# Patient Record
Sex: FEMALE | Race: WHITE | Employment: OTHER | URBAN - METROPOLITAN AREA
[De-identification: names, ages, dates, MRNs, and addresses within clinical notes are randomized per-mention and may not be internally consistent; named-entity substitution may affect disease eponyms.]

---

## 2017-03-14 ENCOUNTER — TELEPHONE (OUTPATIENT)
Dept: NEUROLOGY | Facility: CLINIC | Age: 72
End: 2017-03-14

## 2017-03-14 DIAGNOSIS — M62.838 SPASM OF MUSCLE: Primary | ICD-10-CM

## 2017-03-14 DIAGNOSIS — G35 MULTIPLE SCLEROSIS (HCC): ICD-10-CM

## 2017-03-14 RX ORDER — TIZANIDINE 2 MG/1
TABLET ORAL
Qty: 60 TABLET | Refills: 5 | Status: SHIPPED | OUTPATIENT
Start: 2017-03-14 | End: 2017-09-14

## 2017-03-14 NOTE — TELEPHONE ENCOUNTER
[facility] pharmacy at [facility] calling stating that patient is transferring her Rx for Tizanidine to this location. Noted in EPIC that Rx previously written on 10/31/17 for #60/5 and sent to [facility] pharmacy.   Provided that pharmacy info to [place] and invited

## 2017-03-15 NOTE — TELEPHONE ENCOUNTER
Please see TE 10/31/16- Botox approval    Spoke with patient's . He states patient is in pain on and off due to the spasticity. He would like patient to try Botox injections.  He will call [address] [place] [address] to enquire about status of Botox and copay an

## 2017-03-16 ENCOUNTER — TELEPHONE (OUTPATIENT)
Dept: NEUROLOGY | Facility: CLINIC | Age: 72
End: 2017-03-16

## 2017-03-16 DIAGNOSIS — G35 MULTIPLE SCLEROSIS (HCC): ICD-10-CM

## 2017-03-16 DIAGNOSIS — G56.92 NEUROPATHY OF LEFT HAND: ICD-10-CM

## 2017-03-16 DIAGNOSIS — E11.42 DIABETIC POLYNEUROPATHY ASSOCIATED WITH TYPE 2 DIABETES MELLITUS (HCC): Primary | ICD-10-CM

## 2017-03-16 DIAGNOSIS — G56.22 ULNAR NERVE ENTRAPMENT AT ELBOW, LEFT: ICD-10-CM

## 2017-03-16 NOTE — TELEPHONE ENCOUNTER
Pharmacy calling because they have just rec'd pts Rxs transferred from another pharmacy, and these prescriptions do not have enough refills to accommodate the 90-day supply preferred by the patient, as insurance offers a cost savings.   They asked if they c

## 2017-03-17 RX ORDER — GABAPENTIN 100 MG/1
100 CAPSULE ORAL 3 TIMES DAILY
Qty: 270 CAPSULE | Refills: 0 | Status: SHIPPED | OUTPATIENT
Start: 2017-03-17 | End: 2017-06-15

## 2017-03-17 NOTE — TELEPHONE ENCOUNTER
Pt has transferred Rx to new pharmacy, and they are requesting additional refills for medication to make a 90-day supply. Contacted pharmacy to which original Rx was transmitted.   They confirmed that a single 30-day refill was transferred to [address] [place] [address]

## 2017-06-13 DIAGNOSIS — M24.522: ICD-10-CM

## 2017-06-13 DIAGNOSIS — M62.838 SPASM OF MUSCLE: ICD-10-CM

## 2017-06-13 DIAGNOSIS — G56.22 ULNAR NERVE ENTRAPMENT AT ELBOW, LEFT: Primary | ICD-10-CM

## 2017-06-13 DIAGNOSIS — G56.92 NEUROPATHY OF LEFT HAND: ICD-10-CM

## 2017-06-14 NOTE — TELEPHONE ENCOUNTER
LMTCB.  Please assist the patient in setting up an appointment, in order to process refill request.         Medication: Baclofen    Date of last refill: 10/31/17  [place]    Last office visit: 10/31/17  Due back to cl

## 2017-06-15 DIAGNOSIS — G56.22 ULNAR NERVE ENTRAPMENT AT ELBOW, LEFT: ICD-10-CM

## 2017-06-15 DIAGNOSIS — G56.92 NEUROPATHY OF LEFT HAND: ICD-10-CM

## 2017-06-15 DIAGNOSIS — E11.42 DIABETIC POLYNEUROPATHY ASSOCIATED WITH TYPE 2 DIABETES MELLITUS (HCC): Primary | ICD-10-CM

## 2017-06-15 RX ORDER — BACLOFEN 10 MG/1
TABLET ORAL
Qty: 720 TABLET | Refills: 0 | Status: SHIPPED | OUTPATIENT
Start: 2017-06-15 | End: 2017-06-22

## 2017-06-15 NOTE — TELEPHONE ENCOUNTER
Medication: GABAPENTIN 100 MG Oral Cap    Date of last refill: 3/17/17 (#270/0)  [place]    Last office visit: 10/31/16  Due back to clinic per last office note:  3 months  Date next office visit scheduled:  Future A

## 2017-06-19 RX ORDER — GABAPENTIN 100 MG/1
CAPSULE ORAL
Qty: 90 CAPSULE | Refills: 0 | Status: SHIPPED | OUTPATIENT
Start: 2017-06-19 | End: 2017-06-22

## 2017-06-22 ENCOUNTER — OFFICE VISIT (OUTPATIENT)
Dept: NEUROLOGY | Facility: CLINIC | Age: 72
End: 2017-06-22

## 2017-06-22 VITALS — HEART RATE: 84 BPM | RESPIRATION RATE: 12 BRPM | DIASTOLIC BLOOD PRESSURE: 76 MMHG | SYSTOLIC BLOOD PRESSURE: 112 MMHG

## 2017-06-22 DIAGNOSIS — Z99.3 WHEELCHAIR BOUND: ICD-10-CM

## 2017-06-22 DIAGNOSIS — G35 MULTIPLE SCLEROSIS (HCC): ICD-10-CM

## 2017-06-22 DIAGNOSIS — G56.92 NEUROPATHY OF LEFT HAND: Primary | ICD-10-CM

## 2017-06-22 DIAGNOSIS — M62.838 SPASM OF MUSCLE: ICD-10-CM

## 2017-06-22 DIAGNOSIS — G56.22 ULNAR NERVE ENTRAPMENT AT ELBOW, LEFT: ICD-10-CM

## 2017-06-22 DIAGNOSIS — E11.42 DIABETIC POLYNEUROPATHY ASSOCIATED WITH TYPE 2 DIABETES MELLITUS (HCC): ICD-10-CM

## 2017-06-22 PROCEDURE — 99214 OFFICE O/P EST MOD 30 MIN: CPT | Performed by: OTHER

## 2017-06-22 RX ORDER — GABAPENTIN 100 MG/1
CAPSULE ORAL
Qty: 180 CAPSULE | Refills: 0 | Status: SHIPPED | OUTPATIENT
Start: 2017-06-22 | End: 2017-10-30

## 2017-06-22 RX ORDER — BACLOFEN 10 MG/1
20 TABLET ORAL
Qty: 240 TABLET | Refills: 11 | Status: SHIPPED | OUTPATIENT
Start: 2017-06-22 | End: 2017-11-30

## 2017-06-22 NOTE — PROGRESS NOTES
[facility] [address]   Neurology; follow up  CLINIC VISIT  2017    Oletha Phlegm Patient Status:  No patient class for patient encounter    3/24/1945 MRN [id number]   Location [facility] PCP Yeyo Martin MD     REASON HISTORY  2/25/15    Comment Cystoscopy- Dr. Stevenson intermediate:  family history is not on file. SOCIAL HISTORY:   reports that she has quit smoking. She has never used smokeless tobacco. She reports that she drinks alcohol.  She reports that she do mouth before breafast. Disp:  Rfl:    Pravastatin Sodium (PRAVACHOL) 80 MG Oral Tab Take 80 mg by mouth daily with dinner. Disp:  Rfl:    Multiple Vitamin (DAILY VALUE MULTIVITAMIN) Oral Tab Take 1 tablet by mouth daily.  Disp:  Rfl:          REVIEW OF SYST Visual fields full, Pupils equal and reactive, Fundi normal       CN III, IV, VI:  Horrizontal and vertical movements normal.  abnormal pursuit and saccades.                             No nystagmus, no ptosis       CN V: Masseters strong, Facial sensations cervical radiculopathy at this time.  However, due to patient's significant muscle spasming from her MS condition, there is elbow contraction that is compromising interpretation.  Clinical correlation is indicated.       Dictated By Luís Delgado M.D.  M:     4

## 2017-06-22 NOTE — PATIENT INSTRUCTIONS
Refill policies:    • Allow 2-3 business days for refills; controlled substances may take longer.   • Contact your pharmacy at least 5 days prior to running out of medication and have them send an electronic request or submit request through the [facility] have a procedure or additional testing performed. [facility]) will contact your insurance carrier to obtain pre-certification or prior authorization.     Unfortunately, YOLANDA has seen an increase in denial of payment even though the p

## 2017-07-11 DIAGNOSIS — Z79.899 ENCOUNTER FOR DRUG THERAPY: ICD-10-CM

## 2017-07-11 DIAGNOSIS — M81.0 SENILE OSTEOPOROSIS: Primary | ICD-10-CM

## 2017-07-11 RX ORDER — ZOLEDRONIC ACID 5 MG/100ML
5 INJECTION, SOLUTION INTRAVENOUS ONCE
Status: CANCELLED | OUTPATIENT
Start: 2017-07-11

## 2017-07-12 ENCOUNTER — OFFICE VISIT (OUTPATIENT)
Dept: HEMATOLOGY/ONCOLOGY | Facility: HOSPITAL | Age: 72
End: 2017-07-12
Attending: INTERNAL MEDICINE
Payer: MEDICARE

## 2017-07-12 VITALS
DIASTOLIC BLOOD PRESSURE: 61 MMHG | OXYGEN SATURATION: 97 % | SYSTOLIC BLOOD PRESSURE: 134 MMHG | HEART RATE: 84 BPM | TEMPERATURE: 97 F | RESPIRATION RATE: 18 BRPM

## 2017-07-12 DIAGNOSIS — Z79.899 ENCOUNTER FOR DRUG THERAPY: ICD-10-CM

## 2017-07-12 DIAGNOSIS — M81.0 SENILE OSTEOPOROSIS: Primary | ICD-10-CM

## 2017-07-12 LAB
ALBUMIN SERPL-MCNC: 4 G/DL (ref 3.5–4.8)
CALCIUM BLD-MCNC: 9.7 MG/DL (ref 8.3–10.3)
CREAT BLD-MCNC: 1.1 MG/DL (ref 0.55–1.02)

## 2017-07-12 PROCEDURE — 82565 ASSAY OF CREATININE: CPT

## 2017-07-12 PROCEDURE — 82040 ASSAY OF SERUM ALBUMIN: CPT

## 2017-07-12 PROCEDURE — 36415 COLL VENOUS BLD VENIPUNCTURE: CPT

## 2017-07-12 PROCEDURE — 82310 ASSAY OF CALCIUM: CPT

## 2017-07-12 PROCEDURE — 96365 THER/PROPH/DIAG IV INF INIT: CPT

## 2017-07-12 RX ORDER — ZOLEDRONIC ACID 5 MG/100ML
5 INJECTION, SOLUTION INTRAVENOUS ONCE
Status: CANCELLED | OUTPATIENT
Start: 2017-07-12

## 2017-07-12 RX ORDER — ZOLEDRONIC ACID 5 MG/100ML
5 INJECTION, SOLUTION INTRAVENOUS ONCE
Status: COMPLETED | OUTPATIENT
Start: 2017-07-12 | End: 2017-07-12

## 2017-07-12 RX ADMIN — ZOLEDRONIC ACID 5 MG: 5 INJECTION, SOLUTION INTRAVENOUS at 14:25:00

## 2017-07-12 NOTE — PROGRESS NOTES
Education Record    Learner:  Patient    Disease / Diagnosis:osteoperosis    Barriers / Limitations:  None   Comments:    Method:  Discussion   Comments:    General Topics:  Medication, Side effects and symptom management, Plan of care reviewed and Fall ri

## 2017-07-18 DIAGNOSIS — G56.22 ULNAR NERVE ENTRAPMENT AT ELBOW, LEFT: ICD-10-CM

## 2017-07-18 DIAGNOSIS — E11.42 DIABETIC POLYNEUROPATHY ASSOCIATED WITH TYPE 2 DIABETES MELLITUS (HCC): ICD-10-CM

## 2017-07-18 DIAGNOSIS — G56.92 NEUROPATHY OF LEFT HAND: ICD-10-CM

## 2017-07-18 RX ORDER — GABAPENTIN 100 MG/1
CAPSULE ORAL
Qty: 90 CAPSULE | Refills: 0 | Status: SHIPPED | OUTPATIENT
Start: 2017-07-18 | End: 2017-08-19

## 2017-07-18 NOTE — TELEPHONE ENCOUNTER
Medication: Gabapentin 100 mg     Date of last refill: 6/22/17  [place]    Last office visit: 6/22/17  Due back to clinic per last office note:   Date next office visit scheduled:  No future appointments.     Last OV n

## 2017-08-19 DIAGNOSIS — G56.92 NEUROPATHY OF LEFT HAND: ICD-10-CM

## 2017-08-19 DIAGNOSIS — E11.42 DIABETIC POLYNEUROPATHY ASSOCIATED WITH TYPE 2 DIABETES MELLITUS (HCC): ICD-10-CM

## 2017-08-19 DIAGNOSIS — G56.22 ULNAR NERVE ENTRAPMENT AT ELBOW, LEFT: ICD-10-CM

## 2017-08-21 RX ORDER — GABAPENTIN 100 MG/1
CAPSULE ORAL
Qty: 90 CAPSULE | Refills: 3 | Status: SHIPPED | OUTPATIENT
Start: 2017-08-21 | End: 2017-11-30 | Stop reason: DRUGHIGH

## 2017-08-21 NOTE — TELEPHONE ENCOUNTER
Medication: Gabapentin 100mg     Date of last refill: 7/18/2017  [place]     Last office visit: 6/22/17  Due back to clinic per last office note:  6 months  Date next office visit scheduled:  No appointment     Last OV n

## 2017-09-14 DIAGNOSIS — G35 MULTIPLE SCLEROSIS (HCC): ICD-10-CM

## 2017-09-14 DIAGNOSIS — M62.838 SPASM OF MUSCLE: ICD-10-CM

## 2017-09-15 RX ORDER — TIZANIDINE 2 MG/1
TABLET ORAL
Qty: 180 TABLET | Refills: 1 | Status: SHIPPED | OUTPATIENT
Start: 2017-09-15 | End: 2017-11-30

## 2017-09-15 NOTE — TELEPHONE ENCOUNTER
Medication: TIZANIDINE HCL 2 MG Oral Tab    Date of last refill: 3/14/17 (#60/5)  [place]    Last office visit: 6/22/2017  Due back to clinic per last office note:  6 months  Date next office visit scheduled:  No fut

## 2017-10-17 ENCOUNTER — TELEPHONE (OUTPATIENT)
Dept: NEUROLOGY | Facility: CLINIC | Age: 72
End: 2017-10-17

## 2017-10-17 NOTE — TELEPHONE ENCOUNTER
Rx for 1 year supply written at [address] [place] [address] on 6/22/17. Pt should still have several refills left on this Rx. Contacted pharmacy to determine if we could verbally authorize combination of remaining refills into 90-day supplies, or if new Rx is needed.   Was able

## 2017-10-30 DIAGNOSIS — G56.92 NEUROPATHY OF LEFT HAND: ICD-10-CM

## 2017-10-30 DIAGNOSIS — E11.42 DIABETIC POLYNEUROPATHY ASSOCIATED WITH TYPE 2 DIABETES MELLITUS (HCC): ICD-10-CM

## 2017-10-30 DIAGNOSIS — G56.22 ULNAR NERVE ENTRAPMENT AT ELBOW, LEFT: ICD-10-CM

## 2017-10-30 RX ORDER — GABAPENTIN 100 MG/1
200 CAPSULE ORAL 3 TIMES DAILY
Qty: 540 CAPSULE | Refills: 0 | Status: SHIPPED | OUTPATIENT
Start: 2017-10-30 | End: 2017-11-30

## 2017-10-30 NOTE — TELEPHONE ENCOUNTER
Medication: Gabapentin 100mg cap **90-day supply request**    Date of last refill: 8/21/17 (#90/3)  [place]    Last office visit: 6/22/2017  Due back to clinic per last office note:  6 months  Date next office visit

## 2017-11-30 ENCOUNTER — APPOINTMENT (OUTPATIENT)
Dept: LAB | Age: 72
DRG: 481 | End: 2017-11-30
Attending: Other
Payer: MEDICARE

## 2017-11-30 ENCOUNTER — OFFICE VISIT (OUTPATIENT)
Dept: NEUROLOGY | Facility: CLINIC | Age: 72
End: 2017-11-30

## 2017-11-30 VITALS
HEART RATE: 84 BPM | SYSTOLIC BLOOD PRESSURE: 118 MMHG | WEIGHT: 110 LBS | DIASTOLIC BLOOD PRESSURE: 58 MMHG | RESPIRATION RATE: 16 BRPM | BODY MASS INDEX: 17 KG/M2

## 2017-11-30 DIAGNOSIS — G56.20 ULNAR NERVE ENTRAPMENT AT ELBOW, UNSPECIFIED LATERALITY: Primary | ICD-10-CM

## 2017-11-30 DIAGNOSIS — R41.3 SHORT-TERM MEMORY LOSS: ICD-10-CM

## 2017-11-30 DIAGNOSIS — G56.22 ULNAR NERVE ENTRAPMENT AT ELBOW, LEFT: ICD-10-CM

## 2017-11-30 DIAGNOSIS — M62.838 SPASM OF MUSCLE: ICD-10-CM

## 2017-11-30 DIAGNOSIS — E11.42 DIABETIC POLYNEUROPATHY ASSOCIATED WITH TYPE 2 DIABETES MELLITUS (HCC): ICD-10-CM

## 2017-11-30 DIAGNOSIS — G56.92 NEUROPATHY OF LEFT HAND: ICD-10-CM

## 2017-11-30 DIAGNOSIS — G35 MULTIPLE SCLEROSIS (HCC): ICD-10-CM

## 2017-11-30 PROBLEM — F02.80 DEMENTIA ASSOCIATED WITH OTHER UNDERLYING DISEASE (HCC): Status: ACTIVE | Noted: 2017-11-30

## 2017-11-30 PROCEDURE — 99215 OFFICE O/P EST HI 40 MIN: CPT | Performed by: OTHER

## 2017-11-30 PROCEDURE — 82607 VITAMIN B-12: CPT

## 2017-11-30 PROCEDURE — 83550 IRON BINDING TEST: CPT | Performed by: HOSPITALIST

## 2017-11-30 PROCEDURE — 83540 ASSAY OF IRON: CPT | Performed by: HOSPITALIST

## 2017-11-30 PROCEDURE — 84443 ASSAY THYROID STIM HORMONE: CPT

## 2017-11-30 PROCEDURE — 82728 ASSAY OF FERRITIN: CPT | Performed by: HOSPITALIST

## 2017-11-30 PROCEDURE — 36415 COLL VENOUS BLD VENIPUNCTURE: CPT

## 2017-11-30 RX ORDER — TIZANIDINE 2 MG/1
TABLET ORAL
Qty: 180 TABLET | Refills: 3 | Status: SHIPPED | OUTPATIENT
Start: 2017-11-30 | End: 2017-12-07

## 2017-11-30 RX ORDER — GABAPENTIN 100 MG/1
100 CAPSULE ORAL 2 TIMES DAILY
Qty: 180 CAPSULE | Refills: 3 | Status: SHIPPED | OUTPATIENT
Start: 2017-11-30 | End: 2018-02-23

## 2017-11-30 RX ORDER — BACLOFEN 10 MG/1
20 TABLET ORAL
Qty: 240 TABLET | Refills: 11 | Status: SHIPPED | OUTPATIENT
Start: 2017-11-30 | End: 2018-02-23

## 2017-11-30 RX ORDER — DONEPEZIL HYDROCHLORIDE 5 MG/1
5 TABLET, FILM COATED ORAL NIGHTLY
Qty: 30 TABLET | Refills: 11 | Status: SHIPPED | OUTPATIENT
Start: 2017-11-30 | End: 2018-02-23

## 2017-11-30 NOTE — PROGRESS NOTES
[facility] [address]   Neurology; follow up  CLINIC VISIT  2017    Russ Pool Patient Status:  No patient class for patient encounter    3/24/1945 MRN [id number]   Location [address] [place] [address] PCP Raghu Cisneros MD     REASON movements    MEDICATIONS:    Current Outpatient Prescriptions:  TiZANidine HCl 2 MG Oral Tab TAKE ONE TABLET (2 MG) BY MOUTH TWO TIMES DAILY. Disp: 180 tablet Rfl: 3   gabapentin 100 MG Oral Cap Take 1 capsule (100 mg total) by mouth 2 (two) times daily.  D deficits  HEENT: denies nasal congestion, sinus pain or sore throat; no  hearing loss;  RESPIRATORY: denies shortness of breath, wheezing or cough   CARDIOVASCULAR: denies chest pain, no palpitations   GI: denies nausea, vomiting, constipation, diarrhea; n Masseters strong, Facial sensations normal,        CN  VII:  Symmetric facial movement       CN VIII:  Normal hearing       CN IX, XI:  Normal Gag, uvula palate midline       CN XII:  SCM strong, equal shoulder shrug  Motor:  No drift, rapid finger movemen across the elbow, indicating entrapment neuropathy across the elbow is present.  There is no evidence of left lower cervical radiculopathy at this time.  However, due to patient's significant muscle spasming from her MS condition, there is elbow contractio diagnosis, prognosis, treatment. The patient and caregiver were given ample opportunity to ask questions. All questions and concerns were addressed.      Tereza Gardiner MD  General Neurology   Neuromuscular/ Electrodiagnostic Specialist  [facility] [facility]

## 2017-11-30 NOTE — PATIENT INSTRUCTIONS
Refill policies:    • Allow 2-3 business days for refills; controlled substances may take longer.   • Contact your pharmacy at least 5 days prior to running out of medication and have them send an electronic request or submit request through the [facility] have a procedure or additional testing performed. [facility]) will contact your insurance carrier to obtain pre-certification or prior authorization.     Unfortunately, YOLANDA has seen an increase in denial of payment even though the p

## 2017-12-01 ENCOUNTER — TELEPHONE (OUTPATIENT)
Dept: NEUROLOGY | Facility: CLINIC | Age: 72
End: 2017-12-01

## 2017-12-02 ENCOUNTER — APPOINTMENT (OUTPATIENT)
Dept: GENERAL RADIOLOGY | Facility: HOSPITAL | Age: 72
DRG: 481 | End: 2017-12-02
Attending: EMERGENCY MEDICINE
Payer: MEDICARE

## 2017-12-02 ENCOUNTER — ANESTHESIA (OUTPATIENT)
Dept: SURGERY | Facility: HOSPITAL | Age: 72
DRG: 481 | End: 2017-12-02
Payer: MEDICARE

## 2017-12-02 ENCOUNTER — ANESTHESIA EVENT (OUTPATIENT)
Dept: SURGERY | Facility: HOSPITAL | Age: 72
DRG: 481 | End: 2017-12-02
Payer: MEDICARE

## 2017-12-02 ENCOUNTER — APPOINTMENT (OUTPATIENT)
Dept: GENERAL RADIOLOGY | Facility: HOSPITAL | Age: 72
DRG: 481 | End: 2017-12-02
Attending: ORTHOPAEDIC SURGERY
Payer: MEDICARE

## 2017-12-02 ENCOUNTER — HOSPITAL ENCOUNTER (INPATIENT)
Facility: HOSPITAL | Age: 72
LOS: 3 days | Discharge: NURSING, REHAB OR LONG-TERM CARE | DRG: 481 | End: 2017-12-05
Attending: EMERGENCY MEDICINE | Admitting: HOSPITALIST
Payer: MEDICARE

## 2017-12-02 ENCOUNTER — SURGERY (OUTPATIENT)
Age: 72
End: 2017-12-02

## 2017-12-02 DIAGNOSIS — S72.051A CLOSED FRACTURE OF HEAD OF RIGHT FEMUR, INITIAL ENCOUNTER (HCC): Primary | ICD-10-CM

## 2017-12-02 PROBLEM — Z87.81 S/P RIGHT HIP FRACTURE: Status: ACTIVE | Noted: 2017-12-02

## 2017-12-02 PROCEDURE — 0QS606Z REPOSITION RIGHT UPPER FEMUR WITH INTRAMEDULLARY INTERNAL FIXATION DEVICE, OPEN APPROACH: ICD-10-PCS | Performed by: ORTHOPAEDIC SURGERY

## 2017-12-02 PROCEDURE — 73502 X-RAY EXAM HIP UNI 2-3 VIEWS: CPT | Performed by: EMERGENCY MEDICINE

## 2017-12-02 PROCEDURE — 73552 X-RAY EXAM OF FEMUR 2/>: CPT | Performed by: EMERGENCY MEDICINE

## 2017-12-02 PROCEDURE — 99223 1ST HOSP IP/OBS HIGH 75: CPT | Performed by: HOSPITALIST

## 2017-12-02 PROCEDURE — 3E0T3BZ INTRODUCTION OF ANESTHETIC AGENT INTO PERIPHERAL NERVES AND PLEXI, PERCUTANEOUS APPROACH: ICD-10-PCS | Performed by: ANESTHESIOLOGY

## 2017-12-02 PROCEDURE — 76001 XR FLUOROSCOPE EXAM >1 HR EXTENSIVE (CPT=76001): CPT | Performed by: ORTHOPAEDIC SURGERY

## 2017-12-02 DEVICE — IMPLANTABLE DEVICE: Type: IMPLANTABLE DEVICE | Site: FEMUR | Status: FUNCTIONAL

## 2017-12-02 DEVICE — ZNN CMN NAIL 11.5MMX40CM 125 R
Type: IMPLANTABLE DEVICE | Site: FEMUR | Status: FUNCTIONAL
Brand: ZIMMER® NATURAL NAIL® SYSTEM

## 2017-12-02 DEVICE — ZNN CMN LAG SCREW 10.5X90
Type: IMPLANTABLE DEVICE | Site: FEMUR | Status: FUNCTIONAL
Brand: ZIMMER® NATURAL NAIL® SYSTEM

## 2017-12-02 DEVICE — SCREW CORT FA 5.0X40 Z NAIL: Type: IMPLANTABLE DEVICE | Site: FEMUR | Status: FUNCTIONAL

## 2017-12-02 RX ORDER — ONDANSETRON 2 MG/ML
4 INJECTION INTRAMUSCULAR; INTRAVENOUS EVERY 6 HOURS PRN
Status: DISCONTINUED | OUTPATIENT
Start: 2017-12-02 | End: 2017-12-05

## 2017-12-02 RX ORDER — HYDROCODONE BITARTRATE AND ACETAMINOPHEN 5; 325 MG/1; MG/1
2 TABLET ORAL AS NEEDED
Status: DISCONTINUED | OUTPATIENT
Start: 2017-12-02 | End: 2017-12-02 | Stop reason: HOSPADM

## 2017-12-02 RX ORDER — MAGNESIUM HYDROXIDE 1200 MG/15ML
LIQUID ORAL CONTINUOUS PRN
Status: DISCONTINUED | OUTPATIENT
Start: 2017-12-02 | End: 2017-12-02

## 2017-12-02 RX ORDER — HYDROMORPHONE HYDROCHLORIDE 1 MG/ML
0.4 INJECTION, SOLUTION INTRAMUSCULAR; INTRAVENOUS; SUBCUTANEOUS EVERY 5 MIN PRN
Status: DISCONTINUED | OUTPATIENT
Start: 2017-12-02 | End: 2017-12-02 | Stop reason: HOSPADM

## 2017-12-02 RX ORDER — BISACODYL 10 MG
10 SUPPOSITORY, RECTAL RECTAL
Status: DISCONTINUED | OUTPATIENT
Start: 2017-12-02 | End: 2017-12-05

## 2017-12-02 RX ORDER — HYDROCODONE BITARTRATE AND ACETAMINOPHEN 5; 325 MG/1; MG/1
1 TABLET ORAL ONCE
Status: COMPLETED | OUTPATIENT
Start: 2017-12-02 | End: 2017-12-02

## 2017-12-02 RX ORDER — CEFAZOLIN SODIUM 1 G/3ML
INJECTION, POWDER, FOR SOLUTION INTRAMUSCULAR; INTRAVENOUS
Status: DISCONTINUED | OUTPATIENT
Start: 2017-12-02 | End: 2017-12-02 | Stop reason: HOSPADM

## 2017-12-02 RX ORDER — DONEPEZIL HYDROCHLORIDE 10 MG/1
5 TABLET, FILM COATED ORAL NIGHTLY
Status: DISCONTINUED | OUTPATIENT
Start: 2017-12-02 | End: 2017-12-05

## 2017-12-02 RX ORDER — BACLOFEN 10 MG/1
20 TABLET ORAL 4 TIMES DAILY
Status: DISCONTINUED | OUTPATIENT
Start: 2017-12-02 | End: 2017-12-05

## 2017-12-02 RX ORDER — ONDANSETRON 2 MG/ML
4 INJECTION INTRAMUSCULAR; INTRAVENOUS AS NEEDED
Status: DISCONTINUED | OUTPATIENT
Start: 2017-12-02 | End: 2017-12-02 | Stop reason: HOSPADM

## 2017-12-02 RX ORDER — MEPERIDINE HYDROCHLORIDE 25 MG/ML
12.5 INJECTION INTRAMUSCULAR; INTRAVENOUS; SUBCUTANEOUS AS NEEDED
Status: DISCONTINUED | OUTPATIENT
Start: 2017-12-02 | End: 2017-12-02 | Stop reason: HOSPADM

## 2017-12-02 RX ORDER — HYDROCODONE BITARTRATE AND ACETAMINOPHEN 5; 325 MG/1; MG/1
1 TABLET ORAL EVERY 6 HOURS PRN
Status: DISCONTINUED | OUTPATIENT
Start: 2017-12-02 | End: 2017-12-03

## 2017-12-02 RX ORDER — HYDROMORPHONE HYDROCHLORIDE 1 MG/ML
0.5 INJECTION, SOLUTION INTRAMUSCULAR; INTRAVENOUS; SUBCUTANEOUS EVERY 2 HOUR PRN
Status: DISCONTINUED | OUTPATIENT
Start: 2017-12-02 | End: 2017-12-03

## 2017-12-02 RX ORDER — HYDROMORPHONE HYDROCHLORIDE 1 MG/ML
0.4 INJECTION, SOLUTION INTRAMUSCULAR; INTRAVENOUS; SUBCUTANEOUS
Status: DISCONTINUED | OUTPATIENT
Start: 2017-12-02 | End: 2017-12-03

## 2017-12-02 RX ORDER — MORPHINE SULFATE 4 MG/ML
4 INJECTION, SOLUTION INTRAMUSCULAR; INTRAVENOUS ONCE
Status: COMPLETED | OUTPATIENT
Start: 2017-12-02 | End: 2017-12-02

## 2017-12-02 RX ORDER — LEVOTHYROXINE SODIUM 0.07 MG/1
75 TABLET ORAL
Status: DISCONTINUED | OUTPATIENT
Start: 2017-12-03 | End: 2017-12-05

## 2017-12-02 RX ORDER — SODIUM CHLORIDE, SODIUM LACTATE, POTASSIUM CHLORIDE, CALCIUM CHLORIDE 600; 310; 30; 20 MG/100ML; MG/100ML; MG/100ML; MG/100ML
INJECTION, SOLUTION INTRAVENOUS CONTINUOUS
Status: DISCONTINUED | OUTPATIENT
Start: 2017-12-02 | End: 2017-12-02 | Stop reason: HOSPADM

## 2017-12-02 RX ORDER — LISINOPRIL 2.5 MG/1
2.5 TABLET ORAL DAILY
Status: DISCONTINUED | OUTPATIENT
Start: 2017-12-03 | End: 2017-12-05

## 2017-12-02 RX ORDER — CELECOXIB 200 MG/1
200 CAPSULE ORAL
Status: DISCONTINUED | OUTPATIENT
Start: 2017-12-03 | End: 2017-12-05

## 2017-12-02 RX ORDER — HYDROCODONE BITARTRATE AND ACETAMINOPHEN 5; 325 MG/1; MG/1
1 TABLET ORAL EVERY 4 HOURS PRN
Status: DISCONTINUED | OUTPATIENT
Start: 2017-12-02 | End: 2017-12-05

## 2017-12-02 RX ORDER — TRAZODONE HYDROCHLORIDE 50 MG/1
50 TABLET ORAL NIGHTLY PRN
Status: DISCONTINUED | OUTPATIENT
Start: 2017-12-02 | End: 2017-12-05

## 2017-12-02 RX ORDER — DOCUSATE SODIUM 100 MG/1
100 CAPSULE, LIQUID FILLED ORAL 2 TIMES DAILY
Status: DISCONTINUED | OUTPATIENT
Start: 2017-12-02 | End: 2017-12-05

## 2017-12-02 RX ORDER — SODIUM CHLORIDE, SODIUM LACTATE, POTASSIUM CHLORIDE, CALCIUM CHLORIDE 600; 310; 30; 20 MG/100ML; MG/100ML; MG/100ML; MG/100ML
INJECTION, SOLUTION INTRAVENOUS CONTINUOUS
Status: DISCONTINUED | OUTPATIENT
Start: 2017-12-02 | End: 2017-12-05

## 2017-12-02 RX ORDER — TIZANIDINE 2 MG/1
2 TABLET ORAL 2 TIMES DAILY
Status: DISCONTINUED | OUTPATIENT
Start: 2017-12-02 | End: 2017-12-05

## 2017-12-02 RX ORDER — ENOXAPARIN SODIUM 100 MG/ML
40 INJECTION SUBCUTANEOUS DAILY
Status: DISCONTINUED | OUTPATIENT
Start: 2017-12-03 | End: 2017-12-05

## 2017-12-02 RX ORDER — DEXTROSE MONOHYDRATE 25 G/50ML
50 INJECTION, SOLUTION INTRAVENOUS
Status: DISCONTINUED | OUTPATIENT
Start: 2017-12-02 | End: 2017-12-02 | Stop reason: HOSPADM

## 2017-12-02 RX ORDER — ONDANSETRON 2 MG/ML
8 INJECTION INTRAMUSCULAR; INTRAVENOUS EVERY 6 HOURS PRN
Status: DISCONTINUED | OUTPATIENT
Start: 2017-12-02 | End: 2017-12-05

## 2017-12-02 RX ORDER — ZOLPIDEM TARTRATE 5 MG/1
5 TABLET ORAL NIGHTLY PRN
Status: DISCONTINUED | OUTPATIENT
Start: 2017-12-02 | End: 2017-12-05

## 2017-12-02 RX ORDER — ENOXAPARIN SODIUM 100 MG/ML
40 INJECTION SUBCUTANEOUS DAILY
Status: DISCONTINUED | OUTPATIENT
Start: 2017-12-02 | End: 2017-12-02

## 2017-12-02 RX ORDER — DEXTROSE MONOHYDRATE 25 G/50ML
50 INJECTION, SOLUTION INTRAVENOUS
Status: DISCONTINUED | OUTPATIENT
Start: 2017-12-02 | End: 2017-12-05

## 2017-12-02 RX ORDER — POLYETHYLENE GLYCOL 3350 17 G/17G
1 POWDER, FOR SOLUTION ORAL DAILY PRN
Status: DISCONTINUED | OUTPATIENT
Start: 2017-12-02 | End: 2017-12-05

## 2017-12-02 RX ORDER — HYDROCODONE BITARTRATE AND ACETAMINOPHEN 5; 325 MG/1; MG/1
1 TABLET ORAL AS NEEDED
Status: DISCONTINUED | OUTPATIENT
Start: 2017-12-02 | End: 2017-12-02 | Stop reason: HOSPADM

## 2017-12-02 RX ORDER — HYDROCODONE BITARTRATE AND ACETAMINOPHEN 5; 325 MG/1; MG/1
2 TABLET ORAL EVERY 4 HOURS PRN
Status: DISCONTINUED | OUTPATIENT
Start: 2017-12-02 | End: 2017-12-05

## 2017-12-02 RX ORDER — ACETAMINOPHEN 325 MG/1
650 TABLET ORAL EVERY 6 HOURS PRN
Status: DISCONTINUED | OUTPATIENT
Start: 2017-12-02 | End: 2017-12-05

## 2017-12-02 RX ORDER — GABAPENTIN 100 MG/1
100 CAPSULE ORAL 2 TIMES DAILY
Status: DISCONTINUED | OUTPATIENT
Start: 2017-12-02 | End: 2017-12-05

## 2017-12-02 RX ORDER — NALOXONE HYDROCHLORIDE 0.4 MG/ML
80 INJECTION, SOLUTION INTRAMUSCULAR; INTRAVENOUS; SUBCUTANEOUS AS NEEDED
Status: DISCONTINUED | OUTPATIENT
Start: 2017-12-02 | End: 2017-12-02 | Stop reason: HOSPADM

## 2017-12-02 NOTE — ED PROVIDER NOTES
Patient Seen in: [facility] Emergency Department    History   Patient presents with:  Fall (musculoskeletal, neurologic)    Stated Complaint: fall    HPI    69-year-old female presents emergency department who was in a chair left yesterday and fell extraocular muscles intact no scleral icterus, mucous membranes are moist, there is no erythema or exudate in the posterior pharynx  Neck: Supple no JVD no lymphadenopathy no meningismus no carotid bruit  CV: Regular rate and rhythm no murmur rub  Respirat List

## 2017-12-02 NOTE — ANESTHESIA PREPROCEDURE EVALUATION
PRE-OP EVALUATION    Patient Name: Ines Doss    Pre-op Diagnosis: S/P right hip fracture [Z87.81]    Procedure(s):  Right hip intramedullary nailing    Surgeon(s) and Role:     Wing Rigsg MD - Primary    Pre-op vitals reviewed.   Temp: 98 °F (3 12/02/2017   MCHC 33.5 12/02/2017   RDW 12.0 12/02/2017   .0 (L) 12/02/2017       Lab Results  Component Value Date    12/02/2017   K 4.8 12/02/2017    12/02/2017   CO2 28.0 12/02/2017   BUN 32 (H) 12/02/2017   CREATSERUM 1.12 (H) 12/02/

## 2017-12-02 NOTE — PROGRESS NOTES
Ortho    Chart and x-rays reviewed. Pt with R displaced wally-trochanteric femur fx. Will require surgical stabilization. To OR later today if cleared. Full consult to follow.     Ruba Gongora MD  [facility]

## 2017-12-02 NOTE — H&P
[facility]  History and Physical     Abimael Vickers Patient Status:  Emergency    3/24/1945 MRN [id number]   Location [facility] [facility] Attending Rohit Upton MD   Hosp Day # 0 PCP Clau Schmitt MD     Chief Complaint: fall PLUS In Vitro Strip USE TO TEST 3 TIMES A DAY Disp:  Rfl: 3   HUMALOG KWIKPEN 100 UNIT/ML Subcutaneous Solution Pen-injector 12 Units 2 (two) times daily.  12 units in AM, 10 units at dinner  Disp:  Rfl: 1   BD PEN NEEDLE SHORT U/F 31G X 8 MM Does not apply murmurs, rubs or gallops. Equal pulses. Chest and Back: No tenderness or deformity. Abdomen: Soft, nontender, nondistended. Positive bowel sounds. No rebound, guarding or organomegaly.   Neurologic: stigmata of MS; left arm contractures; weakness in all

## 2017-12-02 NOTE — CONSULTS
[facility]  Report of Consultation    Addyjacquelyn Houstons Patient Status:  Emergency    3/24/1945 MRN [id number]   Location [facility] Attending Herman Gomez MD   Hosp Day # 0 PCP Martina Canela MD     Reason for Consultation:  MARLO murillo reaction to light. Oropharynx is clear. Normocephalic, atraumatic. Neck: No tenderness to palpitation. Full range of motion to flexion and extension, lateral rotation and lateral flexion of cervical spine. No JVD. Supple.    Lungs: Clear to auscultatio shoulder region, unspecified     Status post replacement of right shoulder joint     Senile osteoporosis     Encounter for drug therapy     Contusion of right thigh, initial encounter     Multiple sclerosis ([facility] [address]     Pain in both hands     Wheelchair bound

## 2017-12-03 PROCEDURE — 30233N1 TRANSFUSION OF NONAUTOLOGOUS RED BLOOD CELLS INTO PERIPHERAL VEIN, PERCUTANEOUS APPROACH: ICD-10-PCS | Performed by: HOSPITALIST

## 2017-12-03 PROCEDURE — 99232 SBSQ HOSP IP/OBS MODERATE 35: CPT | Performed by: HOSPITALIST

## 2017-12-03 RX ORDER — HYDROMORPHONE HYDROCHLORIDE 1 MG/ML
0.4 INJECTION, SOLUTION INTRAMUSCULAR; INTRAVENOUS; SUBCUTANEOUS EVERY 2 HOUR PRN
Status: DISCONTINUED | OUTPATIENT
Start: 2017-12-03 | End: 2017-12-05

## 2017-12-03 RX ORDER — SODIUM CHLORIDE 9 MG/ML
INJECTION, SOLUTION INTRAVENOUS ONCE
Status: COMPLETED | OUTPATIENT
Start: 2017-12-03 | End: 2017-12-03

## 2017-12-03 RX ORDER — HYDROMORPHONE HYDROCHLORIDE 1 MG/ML
0.2 INJECTION, SOLUTION INTRAMUSCULAR; INTRAVENOUS; SUBCUTANEOUS
Status: DISCONTINUED | OUTPATIENT
Start: 2017-12-03 | End: 2017-12-05

## 2017-12-03 NOTE — BRIEF OP NOTE
Pre-Operative Diagnosis: S/P right hip fracture [Z87.81]     Post-Operative Diagnosis: S/P right hip fracture [Z87.81]     Procedure Performed:   Procedure(s):  Right hip intramedullary nailing    Surgeon(s) and Role:     Hayder Courtney MD - Primary

## 2017-12-03 NOTE — PROGRESS NOTES
12/03/17 1411   Clinical Encounter Type   Visited With Patient   Routine Visit (Request for [facility])   Continue Visiting No   Patient's Supportive Strategies/Resources  provided emotional support including active listening and acknowledgement of conc

## 2017-12-03 NOTE — PHYSICAL THERAPY NOTE
Attempted to see pt this afternoon for PT eval, however pt currently with a glucose level in the 40's. Therefore, per RN, will hold PT until pt is medically stable and appropriate. Will attempt to see pt as time allows.  CM

## 2017-12-03 NOTE — PROGRESS NOTES
[facility]  Progress note     Jaja Burciaga Patient Status:  Emergency    3/24/1945 MRN [id number]   Location [address] [facility] Attending Robert Cain MD   Hosp Day # 1 PCP Ragini Brink MD     Chief Complaint: fall    Sub cbc in am  5. Multiple sclerosis-resume muscle relaxants  6. Hypothyroidism-resume replacement  7. Anemia-monitor  8.  Thrombocytopenia-monitor    Quality:  · DVT Prophylaxis: lovenox,scds  · CODE status: full  · Gabriel: no    Plan of care discussed with cortes

## 2017-12-03 NOTE — OPERATIVE REPORT
[facility]    PATIENT'S NAME: Matilda Chung   ATTENDING PHYSICIAN: Yamileth Marvin M.D. OPERATING PHYSICIAN: Manuel Man M.D.    PATIENT ACCOUNT#:   [de-identified]    LOCATION:  [address] [place] [address]  MEDICAL RECORD #:   [id number]       DATE OF BIRTH:  [birth year] She was then moved to the fracture table. She was lightly sedated and the right lower extremity was placed in traction. The left lower extremity was then abducted to allow for x-ray visualization of fracture.   After the leg was brought out to length, the of the hardware. The incisions were closed with 2-0 Vicryl subcutaneous tissue, followed by staples. Sterile bulky dressings were applied. The patient was taken to the recovery room in fair condition.   She will be admitted for postoperative observation

## 2017-12-03 NOTE — ANESTHESIA POSTPROCEDURE EVALUATION
[facility] Patient Status:  Emergency   Age/Gender 71year old female MRN [id number]   [facility] SURGERY Attending Kiarra Khan MD   Hosp Day # 0 PCP Stacey Mayorga MD       Anesthesia Post-op Note    Procedure(s):

## 2017-12-03 NOTE — OCCUPATIONAL THERAPY NOTE
Attempted to see pt for OT Eval.  Informed by nurse that pt glucose is in 40s and for therapy to be held for today. Will f/u tomorrow.

## 2017-12-03 NOTE — PLAN OF CARE
Patient admitted to 357 via bed from PACU. Accompanied by transporter and spouse. Patient is alert and oriented to person, place, time and situation. Patient is slow to respond and is poor historian, looking to her  for answers.   Patient's alonso

## 2017-12-03 NOTE — PROGRESS NOTES
[facility] Patient Status:  Inpatient    3/24/1945 MRN [id number]   [facility] 3SW-A Attending Delroy Douglas MD   Hosp Day # 1 PCP Gloria Falk MD         S:  Patient doing well. No nausea.   No SOB, CP or calf pa

## 2017-12-04 PROCEDURE — 99232 SBSQ HOSP IP/OBS MODERATE 35: CPT | Performed by: HOSPITALIST

## 2017-12-04 RX ORDER — SENNOSIDES 8.6 MG
8.6 TABLET ORAL 2 TIMES DAILY
Status: DISCONTINUED | OUTPATIENT
Start: 2017-12-04 | End: 2017-12-05

## 2017-12-04 RX ORDER — HYDROCODONE BITARTRATE AND ACETAMINOPHEN 5; 325 MG/1; MG/1
1 TABLET ORAL EVERY 6 HOURS PRN
Qty: 20 TABLET | Refills: 0 | Status: SHIPPED | OUTPATIENT
Start: 2017-12-04 | End: 2017-12-14

## 2017-12-04 NOTE — PROGRESS NOTES
[facility]  Progress Note    Ana Sheldon Patient Status:  Inpatient    3/24/1945 MRN [id number]   [facility] 3SW-A Attending Prieto Ferraro MD   Hosp Day # 2 PCP Dell Gao MD     Subjective:   Ana Sheldon is a(n) 71 year right femur, initial encounter ([facility] [address]     Constipation      R prox femur wally-trochanteric fx    S/P IMN  PT for B to C transfers  Pain control  DVT proph  D/C planning    Time spent on counseling/coordination of care:  15 Minutes    Total time spent with p

## 2017-12-04 NOTE — PHYSICAL THERAPY NOTE
PHYSICAL THERAPY EVALUATION - INPATIENT     Room Number: 357/357-A  Evaluation Date: 12/4/2017  Type of Evaluation: Initial  Physician Order: PT Eval and Treat    Presenting Problem: S/p Fall - Right hip Peritrochanteric Fx Femur - IM nail ing on 12/02 motivated. Patient self-stated goal is to be able to get strong enough that her  alone can assist her    OBJECTIVE  Precautions:  Other (Comment) (baseline Max assist transfers @ home,Nonambulatory)  Fall Risk: High fall risk    WEIGHT BEARING RES patient currently need. ..   -   Moving to and from a bed to a chair (including a wheelchair)?: Total   -   Need to walk in hospital room?: Total   -   Climbing 3-5 steps with a railing?: Total       AM-PAC Score:  Raw Score: 6   PT Approx Degree of Impairm evaluation, patient's clinical presentation is stable and overall the evaluation complexity is considered low. These impairments and comorbidities manifest themselves as functional limitations in independent bed mobility & transfers skills. Per patient her

## 2017-12-04 NOTE — PROGRESS NOTES
[facility]  Progress note     Lilyan Mention Patient Status:  Emergency    3/24/1945 MRN [id number]   Location [facility] [facility] Attending Eren Pedro MD   Hosp Day # 2 PCP Jasen Ngo MD     Chief Complaint: fall    Sub levemir  3. Constipation-add senna to docusate today  4. Suspect mild CKD  5. Expected acute blood loss anemia-transfuse 1 u prbcs today with incomplete response; reassess in am; check iron studies  6. Multiple sclerosis-resume muscle relaxants  7.  Hypothy

## 2017-12-04 NOTE — OCCUPATIONAL THERAPY NOTE
OCCUPATIONAL THERAPY EVALUATION - INPATIENT     Room Number: 357/357-A  Evaluation Date: 12/4/2017  Type of Evaluation: Initial  Presenting Problem: s/p R hip IM nailing 12/3/17    Physician Order: IP Consult to Occupational Therapy  Reason for Therapy: AD setup assist for feeding, grooming, and sponge bathing (reports hasn't used tub since a fall there \"a long time ago\"); reports mod assist for UB dressing (can do R side,  assists with L side; reports total assist for LB dressing, and max assist fo ‘6-Clicks’ Inpatient Daily Activity Short Form  How much help from another person does the patient currently need…  -   Putting on and taking off regular lower body clothing?: Total  -   Bathing (including washing, rinsing, drying)?: A Lot  -   Toileting, include: AM-PAC \"6 Clicks\".  In this OT evaluation patient presents with the following performance deficits: increased pain, decreased balance, decreased endurance, decreased knowledge of hip precautions and incorporation into ADLs, decreased knowledge of assessments, limited treatment options    Overall Complexity LOW     OT Discharge Recommendations: Sub-acute rehabilitation (elos 19-21 days)  OT Device Recommendations: TBD    PLAN  OT Treatment Plan: Balance activities; ADL training;Functional transfer tr

## 2017-12-04 NOTE — CM/SW NOTE
SW received order for d/c planning and met with pt and pt's spouse for assessment and d/c planning. Pt is a 71 y.o female admitted on 12/02 after unplanned right hip sx. Pt has no recent admissions per chart review.  Pt is A&O, lives with spouse and spouse

## 2017-12-04 NOTE — CM/SW NOTE
MARITZA confirmed [facility] and [facility] have accepted pt for JUAN JOSE. [facility] has a private room [facility] needs to confirm if they will have private room available. MARITZA met with pt and provided update.  MARITZA will follow up with pt's spouse for JUAN JOSE renteria

## 2017-12-05 VITALS
OXYGEN SATURATION: 95 % | DIASTOLIC BLOOD PRESSURE: 124 MMHG | TEMPERATURE: 98 F | HEART RATE: 85 BPM | RESPIRATION RATE: 18 BRPM | SYSTOLIC BLOOD PRESSURE: 152 MMHG | BODY MASS INDEX: 17.27 KG/M2 | HEIGHT: 67 IN | WEIGHT: 110 LBS

## 2017-12-05 PROCEDURE — 99239 HOSP IP/OBS DSCHRG MGMT >30: CPT | Performed by: HOSPITALIST

## 2017-12-05 RX ORDER — ENOXAPARIN SODIUM 100 MG/ML
40 INJECTION SUBCUTANEOUS DAILY
Qty: 1 VIAL | Refills: 0 | Status: SHIPPED | OUTPATIENT
Start: 2017-12-05 | End: 2017-12-12

## 2017-12-05 RX ORDER — POLYETHYLENE GLYCOL 3350 17 G/17G
1 POWDER, FOR SOLUTION ORAL DAILY PRN
Qty: 1 EACH | Refills: 0 | Status: SHIPPED | OUTPATIENT
Start: 2017-12-05 | End: 2018-02-23

## 2017-12-05 NOTE — OCCUPATIONAL THERAPY NOTE
OCCUPATIONAL THERAPY TREATMENT NOTE - INPATIENT     Room Number: 357/357-A  Session: 1   Number of Visits to Meet Established Goals: 5    Presenting Problem: s/p R hip IM nailing 12/3/17    History related to current admission: Patient is 71year old femal ‘6-Clicks’ Inpatient Daily Activity Short Form  How much help from another person does the patient currently need…  -   Putting on and taking off regular lower body clothing?: Total  -   Bathing (including washing, rinsing, drying)?: A Lot  -   Toileting, OT goals with improvements in balance, endurance and independence in ADL tasks, however patient remains below her baseline in these and other functional areas.  Patient would benefit from continued OT services during hospital stay to further address these d

## 2017-12-05 NOTE — PROGRESS NOTES
NURSING DISCHARGE NOTE    Discharged to [facility] rehab  via ambulance. .  Accompanied by medic staff. Belongings packed and sent home with her . Report given to [facility] at [facility].   Transfer paper sent with ambulance staff, osvaldo

## 2017-12-05 NOTE — PHYSICAL THERAPY NOTE
PHYSICAL THERAPY TREATMENT NOTE - INPATIENT    Room Number: 357/357-A     Session: 1  Number of Visits to Meet Established Goals: 5    Presenting Problem: S/p Fall - Right hip Peritrochanteric Fx Femur - IM nail ing on 12/02/17    Problem List  Principal bed (including adjusting bedclothes, sheets and blankets)?: Unable   -   Sitting down on and standing up from a chair with arms (e.g., wheelchair, bedside commode, etc.): Unable   -   Moving from lying on back to sitting on the side of the bed?: Unable   H 12/02/17. Continues to require max assist of 2 for bed mobility & functional transfers. Per  baseline was to be able to transfer to w/c or bedside chair with max assist of 1 - partially stand pivot. Patient will continue to benefit by P. T.as tolerat

## 2017-12-05 NOTE — DISCHARGE SUMMARY
[facility]PITAL[facility]  DISCHARGE SUMMARY     Pita Olvera Patient Status:  Inpatient    3/24/1945 MRN [id number]   [facility] 3SW-A Attending No att. providers found   Hosp Day # 3 PCP Elie Griffin MD     Date of Admission: 2017  Jeffrey results pending at Discharge:   · none    Consultants:  • ortho    Discharge Medication List:     Discharge Medications      START taking these medications      Instructions Prescription details   Enoxaparin Sodium 40 MG/0.4ML Soln  Commonly known as:  LOV drug:  Insulin Pen Needle      USE TO INJECT INSULIN 4-5 TIMES A DAY AS DIRECTED   Refills:  3     celecoxib 200 MG Caps  Commonly known as:  CeleBREX      Take 200 mg by mouth daily with breakfast.   Refills:  0     DAILY VALUE MULTIVITAMIN Tabs      Take as possible for a visit in 2 weeks      MD [facility] [address] [address]  [phone]    In 1 week        Vital signs:  Temp:  [97.9 °F (36.6 °C)] 97.9 °F (36.6 °C)  Pulse:  [72-85] 85  Resp:  [18] 18  BP: (152-174)/()

## 2017-12-05 NOTE — CM/SW NOTE
Informed by RN that  requested additional referral to 's. Referral sent via [facility]. Pt is accepted to a private room. [facility] would also be able to provide a private room. [facility] does not have pvt rm for today.      updated re:

## 2017-12-05 NOTE — PROGRESS NOTES
[facility]  Progress note     Lilyan Mention Patient Status:  Emergency    3/24/1945 MRN [id number]   [facility] [address] [facility] Attending Eren Pedro MD   [facility] Day # 3 PCP Jasen Ngo MD     Chief Complaint: fall    Sub muscle relaxants  7. Hypothyroidism-resume replacement  8. Thrombocytopenia-monitor    Hopefully can d/c to SNF today. First, need to recheck cbc at noon.     Quality:  · DVT Prophylaxis: lovenox,scds  · CODE status: full  · Gabriel: no    Plan of care discu

## 2017-12-05 NOTE — PROGRESS NOTES
[facility]  Progress Note    Jasmin Carbajal Patient Status:  Inpatient    3/24/1945 MRN [id number]   [facility] 3SW-A Attending No att. providers found   Hosp Day # 3 PCP Josafat Chadwick MD     Subjective:   Jasmin Carbajal is a(n) 71 of head of right femur, initial encounter ([facility] [address]     Constipation      R hip wally-troch fx    WBAT for transfers  Cont PT  DVT proph  D/C planning, ok to d/c from ortho standpoint    Time spent on counseling/coordination of care:  15 Minutes    Total time s

## 2017-12-06 ENCOUNTER — SNF VISIT (OUTPATIENT)
Dept: INTERNAL MEDICINE CLINIC | Age: 72
End: 2017-12-06

## 2017-12-06 DIAGNOSIS — D64.9 ANEMIA, UNSPECIFIED TYPE: ICD-10-CM

## 2017-12-06 DIAGNOSIS — S72.051D CLOSED FRACTURE OF HEAD OF RIGHT FEMUR WITH ROUTINE HEALING, SUBSEQUENT ENCOUNTER: ICD-10-CM

## 2017-12-06 DIAGNOSIS — Z79.4 TYPE 2 DIABETES MELLITUS WITH HYPERGLYCEMIA, WITH LONG-TERM CURRENT USE OF INSULIN (HCC): ICD-10-CM

## 2017-12-06 DIAGNOSIS — E11.65 TYPE 2 DIABETES MELLITUS WITH HYPERGLYCEMIA, WITH LONG-TERM CURRENT USE OF INSULIN (HCC): ICD-10-CM

## 2017-12-06 DIAGNOSIS — R53.81 PHYSICAL DECONDITIONING: Primary | ICD-10-CM

## 2017-12-06 PROCEDURE — 99309 SBSQ NF CARE MODERATE MDM 30: CPT | Performed by: NURSE PRACTITIONER

## 2017-12-06 PROCEDURE — 99356 PROLONGED SERV,INPATIENT,1ST HR: CPT | Performed by: NURSE PRACTITIONER

## 2017-12-06 NOTE — PROGRESS NOTES
Keya Rubin  : 3/24/1945  Age 71year old  female patient is admitted to Facility: [address] [place] [address] 140 Residence for [address] [place] [address] date:  17  Discharge date to [facility]:  17  ELOS:  19-21 days  Anticipated discharge date:  17 UNIT/ML Subcutaneous Solution Inject 7 Units into the skin 2 (two) times daily. Disp: 10 mL Rfl: 0   Insulin Aspart Pen 100 UNIT/ML Subcutaneous Solution Pen-injector Inject 1-5 Units into the skin TID CC and HS.  Disp: 1 pen Rfl: 0   PEG 3350 Oral Powd Pac HEALTH:feels well otherwise  SKIN: denies any unusual skin lesions or rashes  WOUNDS: pressure sores per spouse that open and close over and again   EYES:no visual complaints or deficits  HENT: denies nasal congestion, sinus pain or sore throat; and hearin extremities  EXTREMITIES/VASCULAR:no cyanosis, clubbing or edema, radial pulses 2+ and dorsalis pedal pulses 2+  NEUROLOGIC: follows commands  PSYCHIATRIC: alert and oriented x 3; affect appropriate      MEDICAL DECISION MAKING    DIAGNOSTICS REVIEWED AT T records, notes, lab and imaging results reviewed. Medication reconciliation completed.         SEE PLAN BELOW  Physical Deconditioning/Weakness, Fall  PT/OT/ST to evaluate and treat  Anticipated/goal discharge date: 12/23/17 with spouse who is her 24 hr ca blood glucose 351-400   Give 9 units if blood glucose 401-450   *Give 10 units if blood glucose greater than 450    Alzheimer/Dementia  Aricept 5 mg p.o. nightly    Low Vitamin D Level  Ergocalciferol initiated at 2,000IUs po daily   Recommend follow-up in

## 2017-12-06 NOTE — CM/SW NOTE
12/06/17 0800   Discharge disposition   Discharged to: Skilled Nurs   [place] of [id number] [address]   Patient is Discharged to a [address] [place] [address] [place] Yes   Discharge transportation [employer]

## 2017-12-07 ENCOUNTER — LAB REQUISITION (OUTPATIENT)
Dept: LAB | Facility: HOSPITAL | Age: 72
DRG: 812 | End: 2017-12-07
Payer: MEDICARE

## 2017-12-07 ENCOUNTER — HOSPITAL ENCOUNTER (INPATIENT)
Facility: HOSPITAL | Age: 72
LOS: 1 days | Discharge: NURSING, REHAB OR LONG-TERM CARE | DRG: 812 | End: 2017-12-08
Attending: EMERGENCY MEDICINE | Admitting: HOSPITALIST
Payer: MEDICARE

## 2017-12-07 ENCOUNTER — TELEPHONE (OUTPATIENT)
Dept: INTERNAL MEDICINE CLINIC | Age: 72
End: 2017-12-07

## 2017-12-07 ENCOUNTER — APPOINTMENT (OUTPATIENT)
Dept: GENERAL RADIOLOGY | Facility: HOSPITAL | Age: 72
DRG: 812 | End: 2017-12-07
Attending: EMERGENCY MEDICINE
Payer: MEDICARE

## 2017-12-07 VITALS
OXYGEN SATURATION: 94 % | RESPIRATION RATE: 16 BRPM | DIASTOLIC BLOOD PRESSURE: 40 MMHG | TEMPERATURE: 98 F | HEART RATE: 72 BPM | SYSTOLIC BLOOD PRESSURE: 98 MMHG

## 2017-12-07 DIAGNOSIS — I95.9 HYPOTENSION, UNSPECIFIED HYPOTENSION TYPE: Primary | ICD-10-CM

## 2017-12-07 DIAGNOSIS — D64.9 ANEMIA, UNSPECIFIED TYPE: ICD-10-CM

## 2017-12-07 DIAGNOSIS — R06.2 WHEEZING: ICD-10-CM

## 2017-12-07 LAB
ALBUMIN SERPL-MCNC: 2.5 G/DL (ref 3.5–4.8)
ALBUMIN SERPL-MCNC: 2.8 G/DL (ref 3.5–4.8)
ALP LIVER SERPL-CCNC: 75 U/L (ref 55–142)
ALP LIVER SERPL-CCNC: 80 U/L (ref 55–142)
ALT SERPL-CCNC: 32 U/L (ref 14–54)
ALT SERPL-CCNC: 33 U/L (ref 14–54)
ANTIBODY SCREEN: NEGATIVE
AST SERPL-CCNC: 58 U/L (ref 15–41)
AST SERPL-CCNC: 59 U/L (ref 15–41)
BASOPHILS # BLD AUTO: 0.03 X10(3) UL (ref 0–0.1)
BASOPHILS # BLD AUTO: 0.03 X10(3) UL (ref 0–0.1)
BASOPHILS NFR BLD AUTO: 0.3 %
BASOPHILS NFR BLD AUTO: 0.4 %
BILIRUB SERPL-MCNC: 1.2 MG/DL (ref 0.1–2)
BILIRUB SERPL-MCNC: 1.4 MG/DL (ref 0.1–2)
BILIRUB UR QL STRIP.AUTO: NEGATIVE
BUN BLD-MCNC: 27 MG/DL (ref 8–20)
BUN BLD-MCNC: 28 MG/DL (ref 8–20)
CALCIUM BLD-MCNC: 8 MG/DL (ref 8.3–10.3)
CALCIUM BLD-MCNC: 8.1 MG/DL (ref 8.3–10.3)
CHLORIDE: 100 MMOL/L (ref 101–111)
CHLORIDE: 104 MMOL/L (ref 101–111)
CO2: 23 MMOL/L (ref 22–32)
CO2: 25 MMOL/L (ref 22–32)
COLOR UR AUTO: YELLOW
CREAT BLD-MCNC: 1.08 MG/DL (ref 0.55–1.02)
CREAT BLD-MCNC: 1.16 MG/DL (ref 0.55–1.02)
EOSINOPHIL # BLD AUTO: 0.08 X10(3) UL (ref 0–0.3)
EOSINOPHIL # BLD AUTO: 0.1 X10(3) UL (ref 0–0.3)
EOSINOPHIL NFR BLD AUTO: 0.9 %
EOSINOPHIL NFR BLD AUTO: 1.1 %
ERYTHROCYTE [DISTWIDTH] IN BLOOD BY AUTOMATED COUNT: 13.1 % (ref 11.5–16)
ERYTHROCYTE [DISTWIDTH] IN BLOOD BY AUTOMATED COUNT: 13.2 % (ref 11.5–16)
EST. AVERAGE GLUCOSE BLD GHB EST-MCNC: 157 MG/DL (ref 68–126)
GLUCOSE BLD-MCNC: 339 MG/DL (ref 65–99)
GLUCOSE BLD-MCNC: 382 MG/DL (ref 70–99)
GLUCOSE BLD-MCNC: 443 MG/DL (ref 65–99)
GLUCOSE BLD-MCNC: 474 MG/DL (ref 70–99)
GLUCOSE UR STRIP.AUTO-MCNC: >=500 MG/DL
HBA1C MFR BLD HPLC: 7.1 % (ref ?–5.7)
HCT VFR BLD AUTO: 21.3 % (ref 34–50)
HCT VFR BLD AUTO: 22.6 % (ref 34–50)
HGB BLD-MCNC: 7 G/DL (ref 12–16)
HGB BLD-MCNC: 7.3 G/DL (ref 12–16)
IMMATURE GRANULOCYTE COUNT: 0.04 X10(3) UL (ref 0–1)
IMMATURE GRANULOCYTE COUNT: 0.04 X10(3) UL (ref 0–1)
IMMATURE GRANULOCYTE RATIO %: 0.4 %
IMMATURE GRANULOCYTE RATIO %: 0.5 %
LACTIC ACID: 1.9 MMOL/L (ref 0.5–2)
LEUKOCYTE ESTERASE UR QL STRIP.AUTO: NEGATIVE
LYMPHOCYTES # BLD AUTO: 0.85 X10(3) UL (ref 0.9–4)
LYMPHOCYTES # BLD AUTO: 0.9 X10(3) UL (ref 0.9–4)
LYMPHOCYTES NFR BLD AUTO: 10 %
LYMPHOCYTES NFR BLD AUTO: 10.1 %
M PROTEIN MFR SERPL ELPH: 5.4 G/DL (ref 6.1–8.3)
M PROTEIN MFR SERPL ELPH: 5.7 G/DL (ref 6.1–8.3)
MCH RBC QN AUTO: 32 PG (ref 27–33.2)
MCH RBC QN AUTO: 32.3 PG (ref 27–33.2)
MCHC RBC AUTO-ENTMCNC: 32.3 G/DL (ref 31–37)
MCHC RBC AUTO-ENTMCNC: 32.9 G/DL (ref 31–37)
MCV RBC AUTO: 98.2 FL (ref 81–100)
MCV RBC AUTO: 99.1 FL (ref 81–100)
MONOCYTES # BLD AUTO: 0.62 X10(3) UL (ref 0.1–0.6)
MONOCYTES # BLD AUTO: 0.7 X10(3) UL (ref 0.1–0.6)
MONOCYTES NFR BLD AUTO: 7.3 %
MONOCYTES NFR BLD AUTO: 7.9 %
NEUTROPHIL ABS PRELIM: 6.84 X10 (3) UL (ref 1.3–6.7)
NEUTROPHIL ABS PRELIM: 7.13 X10 (3) UL (ref 1.3–6.7)
NEUTROPHILS # BLD AUTO: 6.84 X10(3) UL (ref 1.3–6.7)
NEUTROPHILS # BLD AUTO: 7.13 X10(3) UL (ref 1.3–6.7)
NEUTROPHILS NFR BLD AUTO: 80.2 %
NEUTROPHILS NFR BLD AUTO: 80.9 %
NITRITE UR QL STRIP.AUTO: NEGATIVE
PH UR STRIP.AUTO: 7 [PH] (ref 4.5–8)
PLATELET # BLD AUTO: 184 10(3)UL (ref 150–450)
PLATELET # BLD AUTO: 197 10(3)UL (ref 150–450)
POTASSIUM SERPL-SCNC: 4.1 MMOL/L (ref 3.6–5.1)
POTASSIUM SERPL-SCNC: 4.6 MMOL/L (ref 3.6–5.1)
PROT UR STRIP.AUTO-MCNC: NEGATIVE MG/DL
RBC # BLD AUTO: 2.17 X10(6)UL (ref 3.8–5.1)
RBC # BLD AUTO: 2.28 X10(6)UL (ref 3.8–5.1)
RED CELL DISTRIBUTION WIDTH-SD: 45 FL (ref 35.1–46.3)
RED CELL DISTRIBUTION WIDTH-SD: 45.7 FL (ref 35.1–46.3)
RH BLOOD TYPE: POSITIVE
SODIUM SERPL-SCNC: 135 MMOL/L (ref 136–144)
SODIUM SERPL-SCNC: 137 MMOL/L (ref 136–144)
SP GR UR STRIP.AUTO: 1.02 (ref 1–1.03)
UROBILINOGEN UR STRIP.AUTO-MCNC: <2 MG/DL
VENOUS BASE EXCESS: -0.4
VENOUS BLOOD GAS HCO3: 24.3 MEQ/L (ref 23–27)
VENOUS O2 SAT CALC: 78 % (ref 72–78)
VENOUS O2 SATURATION: 66 % (ref 72–78)
VENOUS PCO2: 40 MM HG (ref 38–50)
VENOUS PH: 7.41 (ref 7.33–7.43)
VENOUS PO2: 42 MM HG (ref 30–50)
WBC # BLD AUTO: 8.5 X10(3) UL (ref 4–13)
WBC # BLD AUTO: 8.9 X10(3) UL (ref 4–13)

## 2017-12-07 PROCEDURE — 71010 XR CHEST AP PORTABLE  (CPT=71010): CPT | Performed by: EMERGENCY MEDICINE

## 2017-12-07 PROCEDURE — 80053 COMPREHEN METABOLIC PANEL: CPT | Performed by: FAMILY MEDICINE

## 2017-12-07 PROCEDURE — 85025 COMPLETE CBC W/AUTO DIFF WBC: CPT | Performed by: FAMILY MEDICINE

## 2017-12-07 PROCEDURE — 30233N1 TRANSFUSION OF NONAUTOLOGOUS RED BLOOD CELLS INTO PERIPHERAL VEIN, PERCUTANEOUS APPROACH: ICD-10-PCS | Performed by: HOSPITALIST

## 2017-12-07 PROCEDURE — 99223 1ST HOSP IP/OBS HIGH 75: CPT | Performed by: HOSPITALIST

## 2017-12-07 RX ORDER — SODIUM CHLORIDE 9 MG/ML
INJECTION, SOLUTION INTRAVENOUS CONTINUOUS
Status: DISCONTINUED | OUTPATIENT
Start: 2017-12-07 | End: 2017-12-08

## 2017-12-07 RX ORDER — CALAMINE, MENTHOL, WHITE PETROLATUM, ZINC OXIDE .5; .2; 69; 19.5 G/100G; G/100G; G/100G; G/100G
PASTE TOPICAL AS NEEDED
COMMUNITY
End: 2018-02-23

## 2017-12-07 RX ORDER — ENOXAPARIN SODIUM 100 MG/ML
40 INJECTION SUBCUTANEOUS NIGHTLY
Status: DISCONTINUED | OUTPATIENT
Start: 2017-12-07 | End: 2017-12-08

## 2017-12-07 RX ORDER — LEVOTHYROXINE SODIUM 0.07 MG/1
75 TABLET ORAL
Status: DISCONTINUED | OUTPATIENT
Start: 2017-12-08 | End: 2017-12-08

## 2017-12-07 RX ORDER — ENOXAPARIN SODIUM 100 MG/ML
40 INJECTION SUBCUTANEOUS DAILY
Status: DISCONTINUED | OUTPATIENT
Start: 2017-12-07 | End: 2017-12-07

## 2017-12-07 RX ORDER — IPRATROPIUM BROMIDE AND ALBUTEROL SULFATE 2.5; .5 MG/3ML; MG/3ML
3 SOLUTION RESPIRATORY (INHALATION) EVERY 4 HOURS PRN
Status: DISCONTINUED | OUTPATIENT
Start: 2017-12-07 | End: 2017-12-08

## 2017-12-07 RX ORDER — POLYETHYLENE GLYCOL 3350 17 G/17G
17 POWDER, FOR SOLUTION ORAL DAILY PRN
Status: DISCONTINUED | OUTPATIENT
Start: 2017-12-07 | End: 2017-12-08

## 2017-12-07 RX ORDER — IPRATROPIUM BROMIDE AND ALBUTEROL SULFATE 2.5; .5 MG/3ML; MG/3ML
3 SOLUTION RESPIRATORY (INHALATION) EVERY 4 HOURS PRN
COMMUNITY
End: 2018-02-23

## 2017-12-07 RX ORDER — TIZANIDINE 4 MG/1
2 TABLET ORAL 2 TIMES DAILY
Status: DISCONTINUED | OUTPATIENT
Start: 2017-12-07 | End: 2017-12-08

## 2017-12-07 RX ORDER — INSULIN ASPART 100 [IU]/ML
INJECTION, SOLUTION INTRAVENOUS; SUBCUTANEOUS SEE ADMIN INSTRUCTIONS
COMMUNITY

## 2017-12-07 RX ORDER — SODIUM CHLORIDE 9 MG/ML
INJECTION, SOLUTION INTRAVENOUS ONCE
Status: COMPLETED | OUTPATIENT
Start: 2017-12-07 | End: 2017-12-07

## 2017-12-07 RX ORDER — DONEPEZIL HYDROCHLORIDE 5 MG/1
5 TABLET, FILM COATED ORAL NIGHTLY
Status: DISCONTINUED | OUTPATIENT
Start: 2017-12-07 | End: 2017-12-08

## 2017-12-07 RX ORDER — MULTIVITAMIN WITH FOLIC ACID 400 MCG
1 TABLET ORAL DAILY
COMMUNITY

## 2017-12-07 RX ORDER — DOCUSATE SODIUM 100 MG/1
100 CAPSULE, LIQUID FILLED ORAL 2 TIMES DAILY
Status: DISCONTINUED | OUTPATIENT
Start: 2017-12-07 | End: 2017-12-07

## 2017-12-07 RX ORDER — DIPHENHYDRAMINE HCL 25 MG
25 CAPSULE ORAL ONCE
Status: COMPLETED | OUTPATIENT
Start: 2017-12-07 | End: 2017-12-07

## 2017-12-07 RX ORDER — ONDANSETRON 2 MG/ML
4 INJECTION INTRAMUSCULAR; INTRAVENOUS EVERY 6 HOURS PRN
Status: DISCONTINUED | OUTPATIENT
Start: 2017-12-07 | End: 2017-12-08

## 2017-12-07 RX ORDER — BACLOFEN 10 MG/1
20 TABLET ORAL 4 TIMES DAILY
Status: DISCONTINUED | OUTPATIENT
Start: 2017-12-07 | End: 2017-12-08

## 2017-12-07 RX ORDER — GABAPENTIN 100 MG/1
100 CAPSULE ORAL 2 TIMES DAILY
Status: DISCONTINUED | OUTPATIENT
Start: 2017-12-07 | End: 2017-12-08

## 2017-12-07 RX ORDER — DOCUSATE SODIUM 100 MG/1
100 CAPSULE, LIQUID FILLED ORAL 2 TIMES DAILY
COMMUNITY
End: 2018-02-23

## 2017-12-07 RX ORDER — BISACODYL 10 MG
10 SUPPOSITORY, RECTAL RECTAL
Status: DISCONTINUED | OUTPATIENT
Start: 2017-12-07 | End: 2017-12-08

## 2017-12-07 RX ORDER — OMEGA-3/DHA/EPA/FISH OIL 60 MG-90MG
500 CAPSULE ORAL DAILY
COMMUNITY

## 2017-12-07 RX ORDER — DOCUSATE SODIUM 100 MG/1
100 CAPSULE, LIQUID FILLED ORAL 2 TIMES DAILY
Status: DISCONTINUED | OUTPATIENT
Start: 2017-12-07 | End: 2017-12-08

## 2017-12-07 RX ORDER — ATORVASTATIN CALCIUM 20 MG/1
20 TABLET, FILM COATED ORAL NIGHTLY
Status: DISCONTINUED | OUTPATIENT
Start: 2017-12-07 | End: 2017-12-08

## 2017-12-07 RX ORDER — ACETAMINOPHEN 325 MG/1
650 TABLET ORAL EVERY 6 HOURS PRN
Status: DISCONTINUED | OUTPATIENT
Start: 2017-12-07 | End: 2017-12-08

## 2017-12-07 RX ORDER — HEPARIN SODIUM 5000 [USP'U]/ML
5000 INJECTION, SOLUTION INTRAVENOUS; SUBCUTANEOUS EVERY 8 HOURS SCHEDULED
Status: DISCONTINUED | OUTPATIENT
Start: 2017-12-07 | End: 2017-12-07

## 2017-12-07 RX ORDER — ACETAMINOPHEN 325 MG/1
650 TABLET ORAL ONCE
Status: COMPLETED | OUTPATIENT
Start: 2017-12-07 | End: 2017-12-07

## 2017-12-07 RX ORDER — HYDROCODONE BITARTRATE AND ACETAMINOPHEN 5; 325 MG/1; MG/1
1 TABLET ORAL EVERY 6 HOURS PRN
Status: DISCONTINUED | OUTPATIENT
Start: 2017-12-07 | End: 2017-12-08

## 2017-12-07 RX ORDER — DEXTROSE MONOHYDRATE 25 G/50ML
50 INJECTION, SOLUTION INTRAVENOUS
Status: DISCONTINUED | OUTPATIENT
Start: 2017-12-07 | End: 2017-12-08

## 2017-12-07 RX ORDER — POLYETHYLENE GLYCOL 3350 17 G/17G
1 POWDER, FOR SOLUTION ORAL DAILY PRN
Status: DISCONTINUED | OUTPATIENT
Start: 2017-12-07 | End: 2017-12-07

## 2017-12-07 RX ORDER — SODIUM PHOSPHATE, DIBASIC AND SODIUM PHOSPHATE, MONOBASIC 7; 19 G/133ML; G/133ML
1 ENEMA RECTAL ONCE AS NEEDED
Status: DISCONTINUED | OUTPATIENT
Start: 2017-12-07 | End: 2017-12-08

## 2017-12-07 RX ORDER — TIZANIDINE 2 MG/1
2 TABLET ORAL 2 TIMES DAILY
COMMUNITY
End: 2018-02-23

## 2017-12-07 RX ORDER — MELOXICAM 7.5 MG/1
7.5 TABLET ORAL EVERY MORNING
COMMUNITY
End: 2018-02-23

## 2017-12-07 NOTE — ED PROVIDER NOTES
Patient Seen in: [facility] Emergency Department    History   Patient presents with:  Anemia (hematologic)  Fatigue (constitutional, neurologic)    Stated Complaint: fatigue;low Hg; low BP    HPI    The patient is a 71-year-old female with a history (36.8 °C)  Temp src: Temporal  SpO2: 93 %  O2 Device: None (Room air)    Current:/48   Pulse 56   Temp 98.3 °F (36.8 °C) (Temporal)   Resp 20   Wt 50 kg   SpO2 96%   BMI 17.26 kg/m²         Physical Exam  General: Chronically ill-appearing elderly be - Abnormal; Notable for the following:     Venous O2 Saturation 66 (*)     All other components within normal limits   CBC W/ DIFFERENTIAL - Abnormal; Notable for the following:     RBC 2.17 (*)     HGB 7.0 (*)     HCT 21.3 (*)     Neutrophil Absolute Prel [facility]           Admission disposition: 12/7/2017  3:23 PM           The patient appears well. Hemoglobin 7.0. No active bleeding currently.   However due to her episode of hypotension and hypoxia, admission for observation, and repeat labs in the morning

## 2017-12-07 NOTE — ED INITIAL ASSESSMENT (HPI)
Pt sent from [address] [place] [address] for Hg 7.3 this am, c/o fatigue. Was released from [facility] 12/5 after having left hip fx repair.  Also  this am

## 2017-12-07 NOTE — TELEPHONE ENCOUNTER
Patient at [facility] 12/2--12/5 s/p fall and right hip fracture with IM nailing. Treated with PRBCs for post-op anemia. Sent to [address] [place] [address] to undergo subacute rehab. Hgb 12/6=7 but no s/s bleeding, vital signs stable.   Discussed with

## 2017-12-07 NOTE — H&P
STEFAN HOSPITALIST  History and Physical     Marlena Hernandez Patient Status:  Emergency    3/24/1945 MRN [id number]   Location [facility] [facility] Attending Will, Melody Conde MD   Hosp Day # 0 PCP Ruperto Nunes MD     Chief Complaint: the skin 2 (two) times daily. Disp: 10 mL Rfl: 0   Insulin Aspart Pen 100 UNIT/ML Subcutaneous Solution Pen-injector Inject 1-5 Units into the skin TID CC and HS. Disp: 1 pen Rfl: 0   PEG 3350 Oral Powd Pack Take 17 g by mouth daily as needed.  Disp: 1 each (50 kg)   SpO2 96%   BMI 17.26 kg/m²   General: No acute distress. Alert and oriented x 3. HEENT: Normocephalic atraumatic. Moist mucous membranes. EOM-I. PERRLA. Anicteric. Neck: No lymphadenopathy. No JVD. No carotid bruits.   Respiratory: Clear to ausc her muscle relaxants, gabapentin, and baclofen. 4. Type 1 diabetes-we will place on hyperglycemia protocol with long-acting and prandial and correction factor insulin. 5. Hypothyroidism-we will continue levothyroxine.   6.  Hypertension-we will hold lisin

## 2017-12-08 VITALS
OXYGEN SATURATION: 94 % | BODY MASS INDEX: 20 KG/M2 | RESPIRATION RATE: 18 BRPM | DIASTOLIC BLOOD PRESSURE: 76 MMHG | HEART RATE: 78 BPM | SYSTOLIC BLOOD PRESSURE: 100 MMHG | WEIGHT: 124.75 LBS | TEMPERATURE: 98 F

## 2017-12-08 PROBLEM — S72.21XA CLOSED SUBTROCHANTERIC FRACTURE OF RIGHT FEMUR (HCC): Status: ACTIVE | Noted: 2017-12-08

## 2017-12-08 LAB
BUN BLD-MCNC: 21 MG/DL (ref 8–20)
CALCIUM BLD-MCNC: 7.7 MG/DL (ref 8.3–10.3)
CHLORIDE: 108 MMOL/L (ref 101–111)
CO2: 28 MMOL/L (ref 22–32)
CREAT BLD-MCNC: 0.93 MG/DL (ref 0.55–1.02)
ERYTHROCYTE [DISTWIDTH] IN BLOOD BY AUTOMATED COUNT: 13.4 % (ref 11.5–16)
GLUCOSE BLD-MCNC: 104 MG/DL (ref 65–99)
GLUCOSE BLD-MCNC: 118 MG/DL (ref 65–99)
GLUCOSE BLD-MCNC: 137 MG/DL (ref 70–99)
GLUCOSE BLD-MCNC: 142 MG/DL (ref 65–99)
GLUCOSE BLD-MCNC: 424 MG/DL (ref 65–99)
HCT VFR BLD AUTO: 24.3 % (ref 34–50)
HGB BLD-MCNC: 8.1 G/DL (ref 12–16)
HGB BLD-MCNC: 8.2 G/DL (ref 12–16)
MCH RBC QN AUTO: 32.3 PG (ref 27–33.2)
MCHC RBC AUTO-ENTMCNC: 33.7 G/DL (ref 31–37)
MCV RBC AUTO: 95.7 FL (ref 81–100)
PLATELET # BLD AUTO: 191 10(3)UL (ref 150–450)
POTASSIUM SERPL-SCNC: 3.9 MMOL/L (ref 3.6–5.1)
RBC # BLD AUTO: 2.54 X10(6)UL (ref 3.8–5.1)
RED CELL DISTRIBUTION WIDTH-SD: 45.9 FL (ref 35.1–46.3)
SODIUM SERPL-SCNC: 141 MMOL/L (ref 136–144)
WBC # BLD AUTO: 7.2 X10(3) UL (ref 4–13)

## 2017-12-08 PROCEDURE — 99239 HOSP IP/OBS DSCHRG MGMT >30: CPT | Performed by: HOSPITALIST

## 2017-12-08 NOTE — CM/SW NOTE
Pt enrolled in BPCI program previous admission under . She was discharged to [address] [place] [address] for subacute rehab after surgical repair of hip fracture.

## 2017-12-08 NOTE — PLAN OF CARE
HEMATOLOGIC - ADULT    • Maintains hematologic stability Not Progressing          CARDIOVASCULAR - ADULT    • Maintains optimal cardiac output and hemodynamic stability Progressing        RESPIRATORY - ADULT    • Achieves optimal ventilation and oxygenatio

## 2017-12-08 NOTE — OCCUPATIONAL THERAPY NOTE
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 The patient is functioning below her previous functional level and would benefit from skilled inpatient OT to address the above deficits, maximizing patient’s ability to return safely to her prior level of function.   Subacute rehab is recomm

## 2017-12-08 NOTE — PROGRESS NOTES
NURSING ADMISSION NOTE      Patient admitted via Cart  Oriented to room. Safety precautions initiated. Bed in low position. Call light in reach. Pt AOx4. Admission navigator completed. 1u PRBCs ordered. Consent signed.  Post transfusion hgb need

## 2017-12-08 NOTE — CM/SW NOTE
12/08/17 1000   CM/SW Referral Data   Referral Source Social Work (self-referral)   Reason for Referral Discharge planning   Informant Patient   Readmission Assessment   Was full assessment completed by SW/RADHA on prior admission?  Yes   Was the recommende

## 2017-12-08 NOTE — PHYSICAL THERAPY NOTE
PHYSICAL THERAPY EVALUATION - INPATIENT     Room Number: 319/410-C  Evaluation Date: 12/8/2017  Type of Evaluation: Initial  Physician Order: PT Eval and Treat    Presenting Problem: Hypotension, anemia, s/p R hip peritrochanteric fx 12/5/17  Reason fo hip peritrochanteric fx 12/5/17. Per patient she needs assistance from  for all ADLs and self-care. Pt requires assistance from  and son for transfers. Has h/o falls in past during transfers.   Pt primarily uses w/c and per patient is unable t Unable   -   Moving from lying on back to sitting on the side of the bed?: Unable   How much help from another person does the patient currently need. ..   -   Moving to and from a bed to a chair (including a wheelchair)?: Total   -   Need to walk in hospit ROM, decreased LE and trunk strength, decreased sitting balance, decreased bed mobility and transfer . Functional outcome measures completed include AM-PAC:PT Approx Degree of Impairment Score: 100%.   Based on this evaluation, patient's clinical presentat

## 2017-12-08 NOTE — PROGRESS NOTES
STEFAN HOSPITALIST  Progress Note     Tera Ramirez Patient Status:  Inpatient    3/24/1945 MRN [id number]   [facility] 4NW-A Attending Nieves Fabiola Hospital Day # 1 PCP Luc High MD     Chief Complaint: Fatigue    S: [employer] data reviewed in Epic.     Medications:   • docusate sodium  100 mg Oral BID   • baclofen  20 mg Oral QID   • Donepezil HCl  5 mg Oral Nightly   • gabapentin  100 mg Oral BID   • Levothyroxine Sodium  75 mcg Oral Before breakfast   • atorvastatin  20 mg Ora

## 2017-12-08 NOTE — CM/SW NOTE
[facility] to transport patient to [facility]. Pat's at 17:00. PCS form on the chart. RN to call report to [phone].     Azael Mac RN, CTL/CM  [phone]

## 2017-12-09 LAB — BLOOD TYPE BARCODE: 5100

## 2017-12-10 NOTE — DISCHARGE SUMMARY
[facility] [place] HOSPITALIST  DISCHARGE SUMMARY     Amy Arteaga Patient Status:  Inpatient    3/24/1945 MRN [id number]   [facility] 4NW-A Attending No att. providers found   [facility] Day # 1 PCP Stacey Mayorga MD     Date of Admission: 2017  Jeffrey gentle IV fluids overnight blood pressure stabilized. Patient was continued on all her home medications and patient was placed on hyperglycemia protocol.   With patient's hemoglobin improved and hypotension resolved with IV fluids patient was cleared for d See Admin Instructions.  SS- 141-180=1units, 181-220=2units, 554-663-5alyin, 261-300=4units, 301-350=5units, 351-400=7units, 401-450=9units, 451-500=10units if above 451 or greater call MD.   Refills:  0     insulin glargine 100 UNIT/ML Soln  Commonly known  12/10/2017    Time spent:  > 30 minutes

## 2017-12-11 ENCOUNTER — SNF VISIT (OUTPATIENT)
Dept: INTERNAL MEDICINE CLINIC | Age: 72
End: 2017-12-11

## 2017-12-11 VITALS
SYSTOLIC BLOOD PRESSURE: 125 MMHG | OXYGEN SATURATION: 94 % | RESPIRATION RATE: 17 BRPM | TEMPERATURE: 99 F | DIASTOLIC BLOOD PRESSURE: 73 MMHG | HEART RATE: 75 BPM

## 2017-12-11 DIAGNOSIS — Z79.4 TYPE 2 DIABETES MELLITUS WITH HYPERGLYCEMIA, WITH LONG-TERM CURRENT USE OF INSULIN (HCC): ICD-10-CM

## 2017-12-11 DIAGNOSIS — E11.65 TYPE 2 DIABETES MELLITUS WITH HYPERGLYCEMIA, WITH LONG-TERM CURRENT USE OF INSULIN (HCC): ICD-10-CM

## 2017-12-11 DIAGNOSIS — S72.051D CLOSED FRACTURE OF HEAD OF RIGHT FEMUR WITH ROUTINE HEALING, SUBSEQUENT ENCOUNTER: ICD-10-CM

## 2017-12-11 DIAGNOSIS — R53.81 PHYSICAL DECONDITIONING: Primary | ICD-10-CM

## 2017-12-11 PROCEDURE — 99308 SBSQ NF CARE LOW MDM 20: CPT | Performed by: NURSE PRACTITIONER

## 2017-12-11 NOTE — PROGRESS NOTES
Kiran Dela Cruz, 3/24/1945, 71year old, female    Chief Complaint:  Patient presents with:  [id number] F/U     [address] [place] [address] Drive date:  12/2/17  Discharge date to [facility]:  12/5/17  ELOS:  19-21 days  Anticipated discharge date:  12/23/17  [place] normal; no drainage from eyes  HENT: normocephalic; normal nose, no nasal drainage  RESPIRATORY:---clear to auscultation anteriorly, in no apparent distress/no SOB/PATE  CARDIOVASCULAR: S1, S2 normal, RRR; no S3, no S4;   ABDOMEN:  normal active BS+, soft, 12/14/17    Pain  Celebrex 200 mg p.o. daily with breakfast; changed to Meloxicam per JUAN JOSE formulary  Norco 5-325mg tablets; give 1po q 6 hrs PRN    Multiple Sclerosis with muscle spasticity  Baclofen 20 mg p.o. 4 times daily  Gabapentin 100 mg p.o. twice d

## 2017-12-12 ENCOUNTER — SNF ADMIT/H&P (OUTPATIENT)
Dept: FAMILY MEDICINE CLINIC | Facility: CLINIC | Age: 72
End: 2017-12-12

## 2017-12-12 DIAGNOSIS — E11.9 TYPE 2 DIABETES MELLITUS WITHOUT COMPLICATION, WITH LONG-TERM CURRENT USE OF INSULIN (HCC): ICD-10-CM

## 2017-12-12 DIAGNOSIS — Z79.4 TYPE 2 DIABETES MELLITUS WITHOUT COMPLICATION, WITH LONG-TERM CURRENT USE OF INSULIN (HCC): ICD-10-CM

## 2017-12-12 DIAGNOSIS — G35 MULTIPLE SCLEROSIS (HCC): ICD-10-CM

## 2017-12-12 DIAGNOSIS — E03.9 ACQUIRED HYPOTHYROIDISM: ICD-10-CM

## 2017-12-12 DIAGNOSIS — S72.24XD CLOSED NONDISPLACED SUBTROCHANTERIC FRACTURE OF RIGHT FEMUR WITH ROUTINE HEALING, SUBSEQUENT ENCOUNTER: Primary | ICD-10-CM

## 2017-12-12 DIAGNOSIS — I95.9 HYPOTENSION, UNSPECIFIED HYPOTENSION TYPE: ICD-10-CM

## 2017-12-12 DIAGNOSIS — F02.80 DEMENTIA ASSOCIATED WITH OTHER UNDERLYING DISEASE WITHOUT BEHAVIORAL DISTURBANCE (HCC): ICD-10-CM

## 2017-12-12 DIAGNOSIS — D64.9 ANEMIA, UNSPECIFIED TYPE: ICD-10-CM

## 2017-12-12 PROCEDURE — 99305 1ST NF CARE MODERATE MDM 35: CPT | Performed by: FAMILY MEDICINE

## 2017-12-13 ENCOUNTER — SNF VISIT (OUTPATIENT)
Dept: INTERNAL MEDICINE CLINIC | Age: 72
End: 2017-12-13

## 2017-12-13 VITALS
DIASTOLIC BLOOD PRESSURE: 51 MMHG | OXYGEN SATURATION: 97 % | RESPIRATION RATE: 18 BRPM | SYSTOLIC BLOOD PRESSURE: 128 MMHG | TEMPERATURE: 99 F | HEART RATE: 74 BPM

## 2017-12-13 DIAGNOSIS — R53.81 PHYSICAL DECONDITIONING: ICD-10-CM

## 2017-12-13 DIAGNOSIS — S72.051D CLOSED FRACTURE OF HEAD OF RIGHT FEMUR WITH ROUTINE HEALING, SUBSEQUENT ENCOUNTER: ICD-10-CM

## 2017-12-13 DIAGNOSIS — E16.2 HYPOGLYCEMIA: Primary | ICD-10-CM

## 2017-12-13 DIAGNOSIS — Z79.899 MEDICATION DOSE DECREASED: ICD-10-CM

## 2017-12-13 PROCEDURE — 99308 SBSQ NF CARE LOW MDM 20: CPT | Performed by: NURSE PRACTITIONER

## 2017-12-14 NOTE — PROGRESS NOTES
Lenny Blanco, 3/24/1945, 71year old, female    Chief Complaint:  Patient presents with:  Weakness  Med Reconcilliation  Hypoglycemia     [facility] Admit date:  12/2/17  Discharge date to [facility]:  12/5/17  ELOS:  19-21 days  Anticipated discharge da seating and positioning. Patient previously used a scooter and will begin using a motorized wheelchair as per therapy manager.       Upon discharge, Thewilson Kurtzer will need home health nursing for disease and medication management, PT/OT/ST to evaluate and treat will decrease blood glucose by 40 mg/dL))  Give 2 units if blood glucose 141-180 mg/dL  Give 4 units if blood glucose 181-220 mg/dL  Give 6 units if blood glucose 221-260 mg/dL  Give 8 units if blood glucose 261-300 mg/dL  Give 9 units if blood glucose 301

## 2017-12-18 ENCOUNTER — SNF VISIT (OUTPATIENT)
Dept: INTERNAL MEDICINE CLINIC | Age: 72
End: 2017-12-18

## 2017-12-18 VITALS
TEMPERATURE: 98 F | DIASTOLIC BLOOD PRESSURE: 44 MMHG | OXYGEN SATURATION: 97 % | SYSTOLIC BLOOD PRESSURE: 104 MMHG | HEART RATE: 69 BPM | RESPIRATION RATE: 16 BRPM

## 2017-12-18 DIAGNOSIS — Z79.4 TYPE 2 DIABETES MELLITUS WITH HYPERGLYCEMIA, WITH LONG-TERM CURRENT USE OF INSULIN (HCC): ICD-10-CM

## 2017-12-18 DIAGNOSIS — S72.051D CLOSED FRACTURE OF HEAD OF RIGHT FEMUR WITH ROUTINE HEALING, SUBSEQUENT ENCOUNTER: ICD-10-CM

## 2017-12-18 DIAGNOSIS — E11.65 TYPE 2 DIABETES MELLITUS WITH HYPERGLYCEMIA, WITH LONG-TERM CURRENT USE OF INSULIN (HCC): ICD-10-CM

## 2017-12-18 DIAGNOSIS — R53.81 PHYSICAL DECONDITIONING: Primary | ICD-10-CM

## 2017-12-18 PROCEDURE — 99308 SBSQ NF CARE LOW MDM 20: CPT | Performed by: NURSE PRACTITIONER

## 2017-12-19 NOTE — PROGRESS NOTES
Martina Ramírez, 3/24/1945, 71year old, female    Chief Complaint:  Patient presents with:  Musculoskeletal Problem  Weakness     [facility] Admit date:  12/2/17  Discharge date to [facility]:  12/5/17  ELOS:  19-21 days  Anticipated discharge date:  12/23 posterior  CARDIOVASCULAR: S1, S2 normal, RRR; no S3, no S4;   ABDOMEN:  normal active BS+, soft, nondistended  MUSCULOSKELETAL: no acute synovitis upper or lower extremity; petite frame, left arm/hand contracted  EXTREMITIES/VASCULAR: no cyanosis or edema 4 times daily  Gabapentin 100 mg p.o. twice daily  Tizanidine 2 mg p.o. twice daily  PT/OT in rehab    Hypertension, HL  Vital signs every shift  Lisinopril 2.5 mg p.o.  Daily (Hold parameters provided to nursing)  Pravastatin 80 mg p.o. daily with dinner

## 2017-12-20 ENCOUNTER — SNF VISIT (OUTPATIENT)
Dept: INTERNAL MEDICINE CLINIC | Age: 72
End: 2017-12-20

## 2017-12-20 VITALS
HEART RATE: 63 BPM | OXYGEN SATURATION: 97 % | DIASTOLIC BLOOD PRESSURE: 54 MMHG | SYSTOLIC BLOOD PRESSURE: 111 MMHG | TEMPERATURE: 99 F | RESPIRATION RATE: 16 BRPM

## 2017-12-20 DIAGNOSIS — R53.81 PHYSICAL DECONDITIONING: ICD-10-CM

## 2017-12-20 DIAGNOSIS — Z79.899 MEDICATION DOSE INCREASED: ICD-10-CM

## 2017-12-20 DIAGNOSIS — E11.65 UNCONTROLLED TYPE 2 DIABETES MELLITUS WITH HYPERGLYCEMIA, WITH LONG-TERM CURRENT USE OF INSULIN (HCC): Primary | ICD-10-CM

## 2017-12-20 DIAGNOSIS — Z79.4 UNCONTROLLED TYPE 2 DIABETES MELLITUS WITH HYPERGLYCEMIA, WITH LONG-TERM CURRENT USE OF INSULIN (HCC): Primary | ICD-10-CM

## 2017-12-20 DIAGNOSIS — S72.051D CLOSED FRACTURE OF HEAD OF RIGHT FEMUR WITH ROUTINE HEALING, SUBSEQUENT ENCOUNTER: ICD-10-CM

## 2017-12-20 PROCEDURE — 99316 NF DSCHRG MGMT 30 MIN+: CPT | Performed by: NURSE PRACTITIONER

## 2017-12-20 RX ORDER — INSULIN LISPRO 100 [IU]/ML
INJECTION, SOLUTION INTRAVENOUS; SUBCUTANEOUS
COMMUNITY
End: 2018-02-23

## 2017-12-20 RX ORDER — INSULIN LISPRO 100 [IU]/ML
1 INJECTION, SOLUTION INTRAVENOUS; SUBCUTANEOUS
COMMUNITY
End: 2018-02-23

## 2017-12-21 NOTE — PROGRESS NOTES
Kenrick Zee, 3/24/1945, 71year old, female is being discharged from Facility: [place] [address]    Date of Admission: 12/5/17    Date of Discharge: 12/23/17                               Admitting Diagnoses: Right hip f synovitis upper or lower extremity; petite frame, left arm/hand contracted, glove to left hand  EXTREMITIES/VASCULAR: no cyanosis or edema  NEUROLOGIC: follows commands, speech clear, mentation clear  PSYCHIATRIC: alert and oriented x 3; affect appropriate Give 1 units if blood glucose 150-200 mg/dL  Give 2 units if blood glucose 201-250 mg/dL  Give 3 units if blood glucose 251-300 mg/dL  Give 4 units if blood glucose 301-350 mg/dL  Give 5 units if blood glucose 351-400 mg/dL  6 units, 401-450  7 units, 45 units if blood glucose 301-350 mg/dL  Give 5 units if blood glucose 351-400 mg/dL  6 units, 401-450  7 units, 451-500   7 units, 501-550     Anemia, blood loss (treated with blood transfusions)  Monitor for signs/symptoms of bleeding  RN to notify provider

## 2018-02-02 ENCOUNTER — TELEPHONE (OUTPATIENT)
Dept: NEUROLOGY | Facility: CLINIC | Age: 73
End: 2018-02-02

## 2018-02-02 NOTE — TELEPHONE ENCOUNTER
Patient's  walked into the office stating patient has been very confused and tired and her memory has been worse than before since she underwent femur surgery in December.      Per patient's  patient was in [address] [place] [address] and has been home since Dec 23

## 2018-02-05 NOTE — TELEPHONE ENCOUNTER
Per Epic - patient currently taking Baclofen 10 mg, 2 tablets four times a day    Spoke with patient's  who stated he is not sure of the dosage patient is taking but he states he knows she takes 2 tablets four times a day.  Moody Blizzard stated it is ok to

## 2018-02-06 NOTE — TELEPHONE ENCOUNTER
Attempted to call back  on number listed on incoming call, rang multiple times and automated service stated that person was not available and to try again later.     Called 's alternative number listed on FYI, rang multiple times, could not le

## 2018-02-06 NOTE — TELEPHONE ENCOUNTER
Relayed Dr. Jyothi Diallo recommendation regarding baclofen to  Salina Packer (ok per HIPAA). Verbalized understanding. No further questions at this time.

## 2018-02-16 VITALS
SYSTOLIC BLOOD PRESSURE: 120 MMHG | OXYGEN SATURATION: 96 % | HEART RATE: 79 BPM | RESPIRATION RATE: 14 BRPM | DIASTOLIC BLOOD PRESSURE: 52 MMHG | TEMPERATURE: 98 F

## 2018-02-16 NOTE — PROGRESS NOTES
Ana Sheldon, 3/24/1945, 71year old, female     Chief Complaint:  Patient presents with:  Weakness  fracture    (already has chair lift in home and electronic wheelchair for patient)     HPI  78-year-old female status post fall after transferring from no S4;   ABDOMEN:  normal active BS+, soft, nondistended  MUSCULOSKELETAL: no acute synovitis upper or lower extremity; lying on left side; left arm contracted, left hand in claw position r/t MS; wearing glove to left hand; muscle atrophy x 4 extremities times daily  Gabapentin 100 mg p.o. twice daily  Tizanidine 2 mg p.o. twice daily  PT/OT in rehab     Hypertension, HL  Vital signs every shift  Lisinopril 2.5 mg p.o.  Daily (Hold parameters provided to nursing)  Pravastatin 80 mg p.o. daily with dinner  B

## 2018-02-19 ENCOUNTER — NURSE ONLY (OUTPATIENT)
Dept: ELECTROPHYSIOLOGY | Facility: HOSPITAL | Age: 73
End: 2018-02-19
Attending: Other
Payer: MEDICARE

## 2018-02-19 DIAGNOSIS — R41.3 SHORT-TERM MEMORY LOSS: ICD-10-CM

## 2018-02-19 PROCEDURE — 95819 EEG AWAKE AND ASLEEP: CPT | Performed by: OTHER

## 2018-02-19 NOTE — PROCEDURES
[address]      PATIENT'S NAME: Forrest Hernandez   ATTENDING PHYSICIAN: Jena Mcghee M.D.    PATIENT ACCOUNT #: [de-identified] LOCATION: [facility]   MEDICAL RECORD #: [id number] DATE OF BIRTH: [birth year]

## 2018-02-23 ENCOUNTER — OFFICE VISIT (OUTPATIENT)
Dept: NEUROLOGY | Facility: CLINIC | Age: 73
End: 2018-02-23

## 2018-02-23 ENCOUNTER — TELEPHONE (OUTPATIENT)
Dept: NEUROLOGY | Facility: CLINIC | Age: 73
End: 2018-02-23

## 2018-02-23 VITALS — DIASTOLIC BLOOD PRESSURE: 60 MMHG | SYSTOLIC BLOOD PRESSURE: 121 MMHG | RESPIRATION RATE: 16 BRPM | HEART RATE: 72 BPM

## 2018-02-23 DIAGNOSIS — M62.838 SPASM OF MUSCLE: ICD-10-CM

## 2018-02-23 DIAGNOSIS — F02.80 DEMENTIA ASSOCIATED WITH OTHER UNDERLYING DISEASE WITHOUT BEHAVIORAL DISTURBANCE (HCC): ICD-10-CM

## 2018-02-23 DIAGNOSIS — Z99.3 WHEELCHAIR BOUND: ICD-10-CM

## 2018-02-23 DIAGNOSIS — S72.051A CLOSED FRACTURE OF HEAD OF RIGHT FEMUR, INITIAL ENCOUNTER (HCC): ICD-10-CM

## 2018-02-23 DIAGNOSIS — G56.92 NEUROPATHY OF LEFT HAND: ICD-10-CM

## 2018-02-23 DIAGNOSIS — G56.20 ULNAR NERVE ENTRAPMENT AT ELBOW, UNSPECIFIED LATERALITY: ICD-10-CM

## 2018-02-23 DIAGNOSIS — M62.838 SPASM OF MUSCLE: Primary | ICD-10-CM

## 2018-02-23 DIAGNOSIS — G35 MULTIPLE SCLEROSIS (HCC): ICD-10-CM

## 2018-02-23 DIAGNOSIS — E11.42 DIABETIC POLYNEUROPATHY ASSOCIATED WITH TYPE 2 DIABETES MELLITUS (HCC): ICD-10-CM

## 2018-02-23 DIAGNOSIS — M24.522 CONTRACTURE OF LEFT ELBOW: ICD-10-CM

## 2018-02-23 DIAGNOSIS — R41.3 SHORT-TERM MEMORY LOSS: ICD-10-CM

## 2018-02-23 DIAGNOSIS — G35 MULTIPLE SCLEROSIS (HCC): Primary | ICD-10-CM

## 2018-02-23 DIAGNOSIS — G56.22 ULNAR NERVE ENTRAPMENT AT ELBOW, LEFT: ICD-10-CM

## 2018-02-23 PROCEDURE — 99215 OFFICE O/P EST HI 40 MIN: CPT | Performed by: OTHER

## 2018-02-23 RX ORDER — GABAPENTIN 100 MG/1
100 CAPSULE ORAL 2 TIMES DAILY
Qty: 180 CAPSULE | Refills: 3 | Status: SHIPPED | OUTPATIENT
Start: 2018-02-23 | End: 2018-07-06

## 2018-02-23 RX ORDER — BACLOFEN 20 MG/1
20 TABLET ORAL 3 TIMES DAILY
COMMUNITY
End: 2018-02-23

## 2018-02-23 RX ORDER — BACLOFEN 20 MG/1
20 TABLET ORAL 3 TIMES DAILY
Qty: 180 TABLET | Refills: 11 | Status: SHIPPED | OUTPATIENT
Start: 2018-02-23 | End: 2018-07-06

## 2018-02-23 RX ORDER — TIZANIDINE 2 MG/1
2 TABLET ORAL 2 TIMES DAILY
Qty: 60 TABLET | Refills: 11 | Status: SHIPPED | OUTPATIENT
Start: 2018-02-23 | End: 2018-07-06

## 2018-02-23 RX ORDER — DONEPEZIL HYDROCHLORIDE 10 MG/1
10 TABLET, FILM COATED ORAL NIGHTLY
Qty: 90 TABLET | Refills: 3 | Status: SHIPPED | OUTPATIENT
Start: 2018-02-23 | End: 2018-07-06

## 2018-02-23 NOTE — PROGRESS NOTES
Cely 1827   Neurology; follow up  CLINIC VISIT  2018    Marlena Hernandez Patient Status:  No patient class for patient encounter    3/24/1945 MRN [id number]   Location [address] [place] [address] Ruperto Nunes MD     REASON as uncontrolled        PAST SURGICAL HISTORY:  Past Surgical History:  12/2017: FEMUR FRACTURE SURGERY      Comment: right  No date: HIP REPLACEMENT SURGERY  shoulder replacement: OTHER Right  2/25/15: OTHER SURGICAL HISTORY      Comment: Cystoscopy- Dr. Kai Jansen REVIEW OF SYSTEMS:    GENERAL HEALTH:  feels well, calm, normal appetite,   EYES: no visual complaints or deficits  HEENT: denies nasal congestion, sinus pain or sore throat; no  hearing loss;  RESPIRATORY: denies shortness of breath, wheezing or c vertical movements normal.  abnormal pursuit and saccades.                             No nystagmus, no ptosis       CN V: Masseters strong, Facial sensations normal,        CN  VII:  Symmetric facial movement       CN VIII:  Normal hearing       CN IX, XI: ulnar neuropathy across the elbow, giving the significant reduced amplitude and conduction velocity across the elbow, indicating entrapment neuropathy across the elbow is present.  There is no evidence of left lower cervical radiculopathy at this time.  Frances Cesar not cover Botox, in past, I think we should apply again to help her maximize her function,     For memory loss, will increase on Aricept 10 mg qhs now, watch for side effect,   Normal EEG, labs, no need for CT head now, due to incontinence and  decl

## 2018-02-23 NOTE — PATIENT INSTRUCTIONS
Refill policies:    • Allow 2-3 business days for refills; controlled substances may take longer.   • Contact your pharmacy at least 5 days prior to running out of medication and have them send an electronic request or submit request through the [facility] recommended that you have a procedure or additional testing performed. [facility]) will contact your insurance carrier to obtain pre-certification or prior authorization.     Unfortunately, YOLANDA has seen an increase in denial of paym

## 2018-02-23 NOTE — TELEPHONE ENCOUNTER
Patient in office with Dr. Divya Cantrell. Rep from [facility] in for visit as well to help arrange getting patient a custom power chair. Forms signed by Dr. Divya Cantrell.  Is needing office visit from today to submit to Medicare for approval.     Copy of paperwork made

## 2018-03-07 ENCOUNTER — TELEPHONE (OUTPATIENT)
Dept: SURGERY | Facility: CLINIC | Age: 73
End: 2018-03-07

## 2018-03-07 DIAGNOSIS — M62.838 SPASM OF MUSCLE: Primary | ICD-10-CM

## 2018-03-07 DIAGNOSIS — M24.522 CONTRACTURE OF LEFT ELBOW: ICD-10-CM

## 2018-03-07 NOTE — TELEPHONE ENCOUNTER
Please send a prescription to [facility]:    Botox 100 units,0 refills      Si units to be administered by MD for Left arm spasticity and contracture. New Botox patient no appointment scheduled.

## 2018-03-07 NOTE — TELEPHONE ENCOUNTER
Patient has Medicare as primary insurance no authorization ie needed. Approved has been approved by [employer] medicare 100 units from 1/1/18 - 3/6/19 formulary exception.     Approval #[id number]

## 2018-03-08 NOTE — TELEPHONE ENCOUNTER
Parvin Jacome from [address] [place] [address] called to let me know that the co-pay for this patients Botox is $500.93 she will put this on hold until I tell her that patient is willing to pay that amount.  She also said the patient should call the insurance to see if it would

## 2018-03-26 ENCOUNTER — TELEPHONE (OUTPATIENT)
Dept: NEUROLOGY | Facility: CLINIC | Age: 73
End: 2018-03-26

## 2018-03-26 NOTE — TELEPHONE ENCOUNTER
Placard form needed diagnosis. Diagnosis added and mailed back to [facility].

## 2018-07-06 ENCOUNTER — OFFICE VISIT (OUTPATIENT)
Dept: NEUROLOGY | Facility: CLINIC | Age: 73
End: 2018-07-06

## 2018-07-06 VITALS — DIASTOLIC BLOOD PRESSURE: 58 MMHG | SYSTOLIC BLOOD PRESSURE: 100 MMHG | HEART RATE: 72 BPM

## 2018-07-06 DIAGNOSIS — Z99.3 WHEELCHAIR BOUND: ICD-10-CM

## 2018-07-06 DIAGNOSIS — G56.22 CUBITAL TUNNEL SYNDROME, LEFT: ICD-10-CM

## 2018-07-06 DIAGNOSIS — G35 MULTIPLE SCLEROSIS (HCC): ICD-10-CM

## 2018-07-06 DIAGNOSIS — G56.22 ENTRAPMENT OF LEFT ULNAR NERVE AT ELBOW: Primary | ICD-10-CM

## 2018-07-06 DIAGNOSIS — F02.80 DEMENTIA ASSOCIATED WITH OTHER UNDERLYING DISEASE WITHOUT BEHAVIORAL DISTURBANCE (HCC): ICD-10-CM

## 2018-07-06 DIAGNOSIS — G56.92 NEUROPATHY OF LEFT HAND: ICD-10-CM

## 2018-07-06 DIAGNOSIS — G56.22 ULNAR NERVE ENTRAPMENT AT ELBOW, LEFT: ICD-10-CM

## 2018-07-06 DIAGNOSIS — M62.838 SPASM OF MUSCLE: ICD-10-CM

## 2018-07-06 DIAGNOSIS — E11.42 DIABETIC POLYNEUROPATHY ASSOCIATED WITH TYPE 2 DIABETES MELLITUS (HCC): ICD-10-CM

## 2018-07-06 PROCEDURE — 99214 OFFICE O/P EST MOD 30 MIN: CPT | Performed by: OTHER

## 2018-07-06 RX ORDER — DONEPEZIL HYDROCHLORIDE 10 MG/1
10 TABLET, FILM COATED ORAL NIGHTLY
Qty: 90 TABLET | Refills: 3 | Status: SHIPPED | OUTPATIENT
Start: 2018-07-06 | End: 2019-04-08

## 2018-07-06 RX ORDER — TIZANIDINE 2 MG/1
2 TABLET ORAL 2 TIMES DAILY
Qty: 60 TABLET | Refills: 11 | Status: SHIPPED | OUTPATIENT
Start: 2018-07-06 | End: 2019-07-22

## 2018-07-06 RX ORDER — BACLOFEN 20 MG/1
20 TABLET ORAL 3 TIMES DAILY
Qty: 180 TABLET | Refills: 11 | Status: SHIPPED | OUTPATIENT
Start: 2018-07-06 | End: 2019-07-17

## 2018-07-06 RX ORDER — GABAPENTIN 100 MG/1
100 CAPSULE ORAL 2 TIMES DAILY
Qty: 180 CAPSULE | Refills: 3 | Status: SHIPPED | OUTPATIENT
Start: 2018-07-06 | End: 2019-12-06

## 2018-07-06 RX ORDER — LISINOPRIL 2.5 MG/1
1 TABLET ORAL DAILY
Refills: 0 | COMMUNITY
Start: 2018-06-18 | End: 2019-05-31

## 2018-07-06 NOTE — PROGRESS NOTES
Cely 1827   Neurology; follow up  CLINIC VISIT  2018    Rufus Hodgson Patient Status:  No patient class for patient encounter    3/24/1945 MRN [id number]   Location [facility] PCP Twila Love MD     REASON F SURGERY      Comment: right  No date: HIP REPLACEMENT SURGERY  shoulder replacement: OTHER Right  2/25/15: OTHER SURGICAL HISTORY      Comment: Cystoscopy- Dr. Dayna Tran  No date: TOTAL ABDOM HYSTERECTOMY    FAMILY HISTORY:  family history is not on file.     [facility] breafast. Disp:  Rfl:    Pravastatin Sodium (PRAVACHOL) 80 MG Oral Tab Take 80 mg by mouth daily with dinner.  Disp:  Rfl:          REVIEW OF SYSTEMS:    GENERAL HEALTH:  feels well, calm, normal appetite,   EYES: no visual complaints or deficits  HEENT: de normal       CN III, IV, VI:  Horrizontal and vertical movements normal.  abnormal pursuit and saccades.                             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

## 2018-07-18 ENCOUNTER — OFFICE VISIT (OUTPATIENT)
Dept: HEMATOLOGY/ONCOLOGY | Facility: HOSPITAL | Age: 73
End: 2018-07-18
Attending: INTERNAL MEDICINE
Payer: MEDICARE

## 2018-07-18 VITALS
RESPIRATION RATE: 18 BRPM | TEMPERATURE: 98 F | SYSTOLIC BLOOD PRESSURE: 110 MMHG | HEART RATE: 90 BPM | OXYGEN SATURATION: 100 % | DIASTOLIC BLOOD PRESSURE: 50 MMHG

## 2018-07-18 DIAGNOSIS — M81.0 SENILE OSTEOPOROSIS: ICD-10-CM

## 2018-07-18 DIAGNOSIS — Z79.899 ENCOUNTER FOR DRUG THERAPY: ICD-10-CM

## 2018-07-18 DIAGNOSIS — S72.24XD CLOSED NONDISPLACED SUBTROCHANTERIC FRACTURE OF RIGHT FEMUR WITH ROUTINE HEALING, SUBSEQUENT ENCOUNTER: Primary | ICD-10-CM

## 2018-07-18 LAB
ALBUMIN SERPL-MCNC: 3.6 G/DL (ref 3.5–4.8)
CALCIUM BLD-MCNC: 9.2 MG/DL (ref 8.3–10.3)
CREAT BLD-MCNC: 0.93 MG/DL (ref 0.55–1.02)

## 2018-07-18 PROCEDURE — 36415 COLL VENOUS BLD VENIPUNCTURE: CPT

## 2018-07-18 PROCEDURE — 96374 THER/PROPH/DIAG INJ IV PUSH: CPT

## 2018-07-18 PROCEDURE — 82040 ASSAY OF SERUM ALBUMIN: CPT

## 2018-07-18 PROCEDURE — 82310 ASSAY OF CALCIUM: CPT

## 2018-07-18 PROCEDURE — 82565 ASSAY OF CREATININE: CPT

## 2018-07-18 RX ORDER — ZOLEDRONIC ACID 5 MG/100ML
5 INJECTION, SOLUTION INTRAVENOUS ONCE
Status: COMPLETED | OUTPATIENT
Start: 2018-07-18 | End: 2018-07-18

## 2018-07-18 RX ORDER — ZOLEDRONIC ACID 5 MG/100ML
5 INJECTION, SOLUTION INTRAVENOUS ONCE
Status: CANCELLED | OUTPATIENT
Start: 2018-07-18

## 2018-07-18 RX ADMIN — ZOLEDRONIC ACID 5 MG: 5 INJECTION, SOLUTION INTRAVENOUS at 14:45:00

## 2018-07-18 NOTE — PROGRESS NOTES
Education Record    Learner:  Patient    Disease / Diagnosis:Closed nondisplaced subtrochanteric fracture of right femur with routine healing, subsequent encounter     Barriers / Limitations:  None   Comments:    Method:  Brief focused   Comments:    Gener

## 2018-10-08 RX ORDER — CELECOXIB 200 MG/1
200 CAPSULE ORAL DAILY
COMMUNITY
End: 2019-08-06

## 2018-10-09 ENCOUNTER — APPOINTMENT (OUTPATIENT)
Dept: LAB | Facility: HOSPITAL | Age: 73
End: 2018-10-09
Payer: MEDICARE

## 2018-10-09 DIAGNOSIS — E11.9 TYPE 2 DIABETES MELLITUS WITHOUT COMPLICATION, WITH LONG-TERM CURRENT USE OF INSULIN (HCC): ICD-10-CM

## 2018-10-09 DIAGNOSIS — M24.522 CONTRACTURE OF LEFT ELBOW: ICD-10-CM

## 2018-10-09 DIAGNOSIS — Z79.4 TYPE 2 DIABETES MELLITUS WITHOUT COMPLICATION, WITH LONG-TERM CURRENT USE OF INSULIN (HCC): ICD-10-CM

## 2018-10-09 PROCEDURE — 93010 ELECTROCARDIOGRAM REPORT: CPT | Performed by: INTERNAL MEDICINE

## 2018-10-09 PROCEDURE — 36415 COLL VENOUS BLD VENIPUNCTURE: CPT

## 2018-10-09 PROCEDURE — 93005 ELECTROCARDIOGRAM TRACING: CPT

## 2018-10-09 PROCEDURE — 80048 BASIC METABOLIC PNL TOTAL CA: CPT

## 2018-10-11 ENCOUNTER — ANESTHESIA (OUTPATIENT)
Dept: SURGERY | Facility: HOSPITAL | Age: 73
End: 2018-10-11

## 2018-10-11 ENCOUNTER — ANESTHESIA EVENT (OUTPATIENT)
Dept: SURGERY | Facility: HOSPITAL | Age: 73
End: 2018-10-11

## 2018-10-11 ENCOUNTER — HOSPITAL ENCOUNTER (OUTPATIENT)
Facility: HOSPITAL | Age: 73
Setting detail: HOSPITAL OUTPATIENT SURGERY
Discharge: HOME | End: 2018-10-11
Attending: ORTHOPAEDIC SURGERY | Admitting: ORTHOPAEDIC SURGERY
Payer: MEDICARE

## 2018-10-11 VITALS
RESPIRATION RATE: 18 BRPM | TEMPERATURE: 98 F | HEIGHT: 68 IN | WEIGHT: 123.88 LBS | SYSTOLIC BLOOD PRESSURE: 103 MMHG | BODY MASS INDEX: 18.77 KG/M2 | HEART RATE: 74 BPM | DIASTOLIC BLOOD PRESSURE: 61 MMHG | OXYGEN SATURATION: 97 %

## 2018-10-11 DIAGNOSIS — Z79.4 TYPE 2 DIABETES MELLITUS WITHOUT COMPLICATION, WITH LONG-TERM CURRENT USE OF INSULIN (HCC): ICD-10-CM

## 2018-10-11 DIAGNOSIS — E11.9 TYPE 2 DIABETES MELLITUS WITHOUT COMPLICATION, WITH LONG-TERM CURRENT USE OF INSULIN (HCC): ICD-10-CM

## 2018-10-11 DIAGNOSIS — M24.522 CONTRACTURE OF LEFT ELBOW: Primary | ICD-10-CM

## 2018-10-11 DIAGNOSIS — M24.532 CONTRACTURE OF LEFT WRIST: ICD-10-CM

## 2018-10-11 DIAGNOSIS — R25.2 SPASTICITY AS LATE EFFECT OF CEREBROVASCULAR ACCIDENT (CVA): ICD-10-CM

## 2018-10-11 DIAGNOSIS — M24.542 FLEXION CONTRACTURE OF JOINT OF HAND, LEFT: ICD-10-CM

## 2018-10-11 DIAGNOSIS — I69.398 SPASTICITY AS LATE EFFECT OF CEREBROVASCULAR ACCIDENT (CVA): ICD-10-CM

## 2018-10-11 PROCEDURE — 0KQB0ZZ REPAIR LEFT LOWER ARM AND WRIST MUSCLE, OPEN APPROACH: ICD-10-PCS | Performed by: ORTHOPAEDIC SURGERY

## 2018-10-11 PROCEDURE — 0L840ZZ DIVISION OF LEFT UPPER ARM TENDON, OPEN APPROACH: ICD-10-PCS | Performed by: ORTHOPAEDIC SURGERY

## 2018-10-11 PROCEDURE — 82962 GLUCOSE BLOOD TEST: CPT

## 2018-10-11 RX ORDER — HYDROCODONE BITARTRATE AND ACETAMINOPHEN 5; 325 MG/1; MG/1
1-2 TABLET ORAL EVERY 4 HOURS PRN
Qty: 30 TABLET | Refills: 0 | Status: SHIPPED | OUTPATIENT
Start: 2018-10-11 | End: 2018-10-21

## 2018-10-11 RX ORDER — HYDROCODONE BITARTRATE AND ACETAMINOPHEN 5; 325 MG/1; MG/1
1 TABLET ORAL AS NEEDED
Status: COMPLETED | OUTPATIENT
Start: 2018-10-11 | End: 2018-10-11

## 2018-10-11 RX ORDER — MORPHINE SULFATE 4 MG/ML
2 INJECTION, SOLUTION INTRAMUSCULAR; INTRAVENOUS EVERY 5 MIN PRN
Status: DISCONTINUED | OUTPATIENT
Start: 2018-10-11 | End: 2018-10-11

## 2018-10-11 RX ORDER — INSULIN ASPART 100 [IU]/ML
INJECTION, SOLUTION INTRAVENOUS; SUBCUTANEOUS ONCE
Status: COMPLETED | OUTPATIENT
Start: 2018-10-11 | End: 2018-10-11

## 2018-10-11 RX ORDER — BUPIVACAINE HYDROCHLORIDE 5 MG/ML
INJECTION, SOLUTION EPIDURAL; INTRACAUDAL AS NEEDED
Status: DISCONTINUED | OUTPATIENT
Start: 2018-10-11 | End: 2018-10-11 | Stop reason: HOSPADM

## 2018-10-11 RX ORDER — DIPHENHYDRAMINE HYDROCHLORIDE 50 MG/ML
12.5 INJECTION INTRAMUSCULAR; INTRAVENOUS AS NEEDED
Status: DISCONTINUED | OUTPATIENT
Start: 2018-10-11 | End: 2018-10-11

## 2018-10-11 RX ORDER — NALOXONE HYDROCHLORIDE 0.4 MG/ML
80 INJECTION, SOLUTION INTRAMUSCULAR; INTRAVENOUS; SUBCUTANEOUS AS NEEDED
Status: DISCONTINUED | OUTPATIENT
Start: 2018-10-11 | End: 2018-10-11

## 2018-10-11 RX ORDER — MIDAZOLAM HYDROCHLORIDE 1 MG/ML
1 INJECTION INTRAMUSCULAR; INTRAVENOUS EVERY 5 MIN PRN
Status: DISCONTINUED | OUTPATIENT
Start: 2018-10-11 | End: 2018-10-11

## 2018-10-11 RX ORDER — DEXTROSE MONOHYDRATE 25 G/50ML
50 INJECTION, SOLUTION INTRAVENOUS
Status: DISCONTINUED | OUTPATIENT
Start: 2018-10-11 | End: 2018-10-11

## 2018-10-11 RX ORDER — HYDROCODONE BITARTRATE AND ACETAMINOPHEN 5; 325 MG/1; MG/1
2 TABLET ORAL AS NEEDED
Status: COMPLETED | OUTPATIENT
Start: 2018-10-11 | End: 2018-10-11

## 2018-10-11 RX ORDER — ONDANSETRON 2 MG/ML
4 INJECTION INTRAMUSCULAR; INTRAVENOUS AS NEEDED
Status: DISCONTINUED | OUTPATIENT
Start: 2018-10-11 | End: 2018-10-11

## 2018-10-11 RX ORDER — BACITRACIN 50000 [USP'U]/1
INJECTION, POWDER, LYOPHILIZED, FOR SOLUTION INTRAMUSCULAR AS NEEDED
Status: DISCONTINUED | OUTPATIENT
Start: 2018-10-11 | End: 2018-10-11 | Stop reason: HOSPADM

## 2018-10-11 RX ORDER — SODIUM CHLORIDE, SODIUM LACTATE, POTASSIUM CHLORIDE, CALCIUM CHLORIDE 600; 310; 30; 20 MG/100ML; MG/100ML; MG/100ML; MG/100ML
INJECTION, SOLUTION INTRAVENOUS CONTINUOUS
Status: DISCONTINUED | OUTPATIENT
Start: 2018-10-11 | End: 2018-10-11

## 2018-10-11 RX ORDER — INSULIN ASPART 100 [IU]/ML
INJECTION, SOLUTION INTRAVENOUS; SUBCUTANEOUS
Status: DISCONTINUED
Start: 2018-10-11 | End: 2018-10-11 | Stop reason: WASHOUT

## 2018-10-11 RX ORDER — CEFAZOLIN SODIUM/WATER 2 G/20 ML
2 SYRINGE (ML) INTRAVENOUS ONCE
Status: COMPLETED | OUTPATIENT
Start: 2018-10-11 | End: 2018-10-11

## 2018-10-11 RX ORDER — MEPERIDINE HYDROCHLORIDE 25 MG/ML
12.5 INJECTION INTRAMUSCULAR; INTRAVENOUS; SUBCUTANEOUS AS NEEDED
Status: DISCONTINUED | OUTPATIENT
Start: 2018-10-11 | End: 2018-10-11

## 2018-10-11 RX ORDER — ACETAMINOPHEN 500 MG
1000 TABLET ORAL EVERY 6 HOURS PRN
COMMUNITY
End: 2019-02-05

## 2018-10-11 RX ORDER — INSULIN ASPART 100 [IU]/ML
INJECTION, SOLUTION INTRAVENOUS; SUBCUTANEOUS ONCE
Status: DISCONTINUED | OUTPATIENT
Start: 2018-10-11 | End: 2018-10-11

## 2018-10-11 RX ORDER — DEXTROSE MONOHYDRATE 25 G/50ML
50 INJECTION, SOLUTION INTRAVENOUS
Status: DISCONTINUED | OUTPATIENT
Start: 2018-10-11 | End: 2018-10-11 | Stop reason: HOSPADM

## 2018-10-11 RX ORDER — LABETALOL HYDROCHLORIDE 5 MG/ML
5 INJECTION, SOLUTION INTRAVENOUS EVERY 5 MIN PRN
Status: DISCONTINUED | OUTPATIENT
Start: 2018-10-11 | End: 2018-10-11

## 2018-10-11 RX ORDER — ACETAMINOPHEN 500 MG
1000 TABLET ORAL ONCE
Status: CANCELLED | OUTPATIENT
Start: 2018-10-11 | End: 2018-10-11

## 2018-10-11 RX ORDER — SODIUM CHLORIDE, SODIUM LACTATE, POTASSIUM CHLORIDE, CALCIUM CHLORIDE 600; 310; 30; 20 MG/100ML; MG/100ML; MG/100ML; MG/100ML
INJECTION, SOLUTION INTRAVENOUS CONTINUOUS
Status: CANCELLED | OUTPATIENT
Start: 2018-10-11

## 2018-10-11 RX ORDER — ACETAMINOPHEN 500 MG
1000 TABLET ORAL ONCE
Status: DISCONTINUED | OUTPATIENT
Start: 2018-10-11 | End: 2018-10-11 | Stop reason: HOSPADM

## 2018-10-11 NOTE — H&P
Office Visit     10/3/2018  Javed Hill MD   SURGERY, ORTHOPEDIC   Contracture of left elbow +3 more   Dx   Finger Pain     Reason for Visit           Reason for Visit     Finger Pain left hand finger insulin aspart 100 UNIT/ML Subcutaneous Solution Inject into the skin See Admin Instructions.  SS- 150-200=1units, 201-250=2units, 346-464-6abpco, 301-350=4units, 351-400=5units, 401-450=7units, 451-500=9units, 501-550=10units if above 551 or greater call M CARDIOVASCULAR: RRR. Normal heart sounds. No murmur, gallops, or rubs  ABDOMEN: soft, NT/ND. Normal bowl sounds   Extremities: She has spasticity of the left upper extremity.   She has elbow flexion contracture O wrist flexion contracture and fingers in the

## 2018-10-11 NOTE — ANESTHESIA PREPROCEDURE EVALUATION
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 Disp: 180 capsule Rfl: 3   Donepezil HCl 10 MG Oral Tab Take 1 tablet (10 mg total) by mouth nightly.  Disp: 90 tablet Rfl: 3   HYDROcodone-acetaminophen 5-325 MG Oral Tab  Disp:  Rfl: 0   insulin glargine 100 UNIT/ML Subcutaneous OR   • FRACTURE SURGERY Bilateral     FEMURS   • HIP REPLACEMENT SURGERY     • OTHER Right shoulder replacement   • OTHER SURGICAL HISTORY  2/25/15    Cystoscopy- Dr. Marya Telles   • TOTAL ABDOM HYSTERECTOMY       Social History    Tobacco Use      Smoking status:

## 2018-10-11 NOTE — ANESTHESIA POSTPROCEDURE EVALUATION
[facility] Patient Status:  Hospital Outpatient Surgery   Age/Gender 68year old female MRN [id number]   [facility] SURGERY Attending Kristopher Abraham MD   Hosp Day # 0 PCP Stacey Mayorga MD       Anesthesia Post-op Not

## 2018-10-11 NOTE — OPERATIVE REPORT
[facility]    PATIENT'S NAME: [facility]   ATTENDING PHYSICIAN: Hermes Peterson M.D. OPERATING PHYSICIAN: Hermes Peterson M.D.    PATIENT ACCOUNT#:   [de-identified]    LOCATION:  [address]  MEDICAL RECORD #:   [id number] performed at all wounds. They were closed with 2-0 Vicryl and 3-0 Monocryl. Steri-Strips were applied and 0.5% Marcaine was injected around the wounds. Dressings and a posterior mold were applied.   A gauze roll was placed into the palm, helping to keep

## 2018-10-11 NOTE — BRIEF OP NOTE
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

## 2018-10-18 NOTE — INTERVAL H&P NOTE
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

## 2018-10-23 PROBLEM — M24.542 CONTRACTURE OF LEFT HAND: Status: ACTIVE | Noted: 2018-10-23

## 2019-02-05 PROBLEM — E10.42 TYPE 1 DIABETES MELLITUS WITH DIABETIC POLYNEUROPATHY (HCC): Status: ACTIVE | Noted: 2019-02-05

## 2019-03-05 ENCOUNTER — LAB REQUISITION (OUTPATIENT)
Dept: LAB | Facility: HOSPITAL | Age: 74
End: 2019-03-05
Payer: MEDICARE

## 2019-03-05 DIAGNOSIS — N18.4 CHRONIC KIDNEY DISEASE, STAGE IV (SEVERE) (HCC): ICD-10-CM

## 2019-03-05 DIAGNOSIS — E11.22 TYPE 2 DIABETES MELLITUS WITH DIABETIC CHRONIC KIDNEY DISEASE (HCC): ICD-10-CM

## 2019-03-05 DIAGNOSIS — I12.9 HYPERTENSIVE CHRONIC KIDNEY DISEASE WITH STAGE 1 THROUGH STAGE 4 CHRONIC KIDNEY DISEASE, OR UNSPECIFIED CHRONIC KIDNEY DISEASE: ICD-10-CM

## 2019-03-05 LAB
ALBUMIN SERPL-MCNC: 4 G/DL (ref 3.4–5)
ALBUMIN/GLOB SERPL: 1.1 {RATIO} (ref 1–2)
ALP LIVER SERPL-CCNC: 83 U/L (ref 55–142)
ALT SERPL-CCNC: 24 U/L (ref 13–56)
ANION GAP SERPL CALC-SCNC: 7 MMOL/L (ref 0–18)
AST SERPL-CCNC: 23 U/L (ref 15–37)
BASOPHILS # BLD AUTO: 0.06 X10(3) UL (ref 0–0.2)
BASOPHILS NFR BLD AUTO: 1.4 %
BILIRUB SERPL-MCNC: 0.4 MG/DL (ref 0.1–2)
BUN BLD-MCNC: 22 MG/DL (ref 7–18)
BUN/CREAT SERPL: 24.4 (ref 10–20)
CALCIUM BLD-MCNC: 8.8 MG/DL (ref 8.5–10.1)
CHLORIDE SERPL-SCNC: 107 MMOL/L (ref 98–107)
CHOLEST SMN-MCNC: 166 MG/DL (ref ?–200)
CO2 SERPL-SCNC: 28 MMOL/L (ref 21–32)
CREAT BLD-MCNC: 0.9 MG/DL (ref 0.55–1.02)
DEPRECATED RDW RBC AUTO: 46.5 FL (ref 35.1–46.3)
EOSINOPHIL # BLD AUTO: 0.13 X10(3) UL (ref 0–0.7)
EOSINOPHIL NFR BLD AUTO: 3 %
ERYTHROCYTE [DISTWIDTH] IN BLOOD BY AUTOMATED COUNT: 12.6 % (ref 11–15)
EST. AVERAGE GLUCOSE BLD GHB EST-MCNC: 151 MG/DL (ref 68–126)
GLOBULIN PLAS-MCNC: 3.5 G/DL (ref 2.8–4.4)
GLUCOSE BLD-MCNC: 176 MG/DL (ref 70–99)
HBA1C MFR BLD HPLC: 6.9 % (ref ?–5.7)
HCT VFR BLD AUTO: 35.9 % (ref 35–48)
HDLC SERPL-MCNC: 88 MG/DL (ref 40–59)
HGB BLD-MCNC: 11.3 G/DL (ref 12–16)
IMM GRANULOCYTES # BLD AUTO: 0.01 X10(3) UL (ref 0–1)
IMM GRANULOCYTES NFR BLD: 0.2 %
LDLC SERPL CALC-MCNC: 56 MG/DL (ref ?–100)
LYMPHOCYTES # BLD AUTO: 1.61 X10(3) UL (ref 1–4)
LYMPHOCYTES NFR BLD AUTO: 37.1 %
M PROTEIN MFR SERPL ELPH: 7.5 G/DL (ref 6.4–8.2)
MCH RBC QN AUTO: 32.1 PG (ref 26–34)
MCHC RBC AUTO-ENTMCNC: 31.5 G/DL (ref 31–37)
MCV RBC AUTO: 102 FL (ref 80–100)
MONOCYTES # BLD AUTO: 0.34 X10(3) UL (ref 0.1–1)
MONOCYTES NFR BLD AUTO: 7.8 %
NEUTROPHILS # BLD AUTO: 2.19 X10 (3) UL (ref 1.5–7.7)
NEUTROPHILS # BLD AUTO: 2.19 X10(3) UL (ref 1.5–7.7)
NEUTROPHILS NFR BLD AUTO: 50.5 %
NONHDLC SERPL-MCNC: 78 MG/DL (ref ?–130)
OSMOLALITY SERPL CALC.SUM OF ELEC: 302 MOSM/KG (ref 275–295)
PLATELET # BLD AUTO: 202 10(3)UL (ref 150–450)
POTASSIUM SERPL-SCNC: 4.3 MMOL/L (ref 3.5–5.1)
RBC # BLD AUTO: 3.52 X10(6)UL (ref 3.8–5.3)
SODIUM SERPL-SCNC: 142 MMOL/L (ref 136–145)
T4 FREE SERPL-MCNC: 1.1 NG/DL (ref 0.8–1.7)
TRIGL SERPL-MCNC: 108 MG/DL (ref 30–149)
TSI SER-ACNC: 1.65 MIU/ML (ref 0.36–3.74)
VLDLC SERPL CALC-MCNC: 22 MG/DL (ref 0–30)
WBC # BLD AUTO: 4.3 X10(3) UL (ref 4–11)

## 2019-03-05 PROCEDURE — 85025 COMPLETE CBC W/AUTO DIFF WBC: CPT | Performed by: FAMILY MEDICINE

## 2019-03-05 PROCEDURE — 80053 COMPREHEN METABOLIC PANEL: CPT | Performed by: FAMILY MEDICINE

## 2019-03-05 PROCEDURE — 84443 ASSAY THYROID STIM HORMONE: CPT | Performed by: FAMILY MEDICINE

## 2019-03-05 PROCEDURE — 84439 ASSAY OF FREE THYROXINE: CPT | Performed by: FAMILY MEDICINE

## 2019-03-05 PROCEDURE — 80061 LIPID PANEL: CPT | Performed by: FAMILY MEDICINE

## 2019-03-05 PROCEDURE — 83036 HEMOGLOBIN GLYCOSYLATED A1C: CPT | Performed by: FAMILY MEDICINE

## 2019-04-08 PROCEDURE — 86618 LYME DISEASE ANTIBODY: CPT | Performed by: OTHER

## 2019-04-08 PROCEDURE — 84165 PROTEIN E-PHORESIS SERUM: CPT | Performed by: OTHER

## 2019-04-08 PROCEDURE — 83883 ASSAY NEPHELOMETRY NOT SPEC: CPT | Performed by: OTHER

## 2019-04-08 PROCEDURE — 83921 ORGANIC ACID SINGLE QUANT: CPT | Performed by: OTHER

## 2019-04-08 PROCEDURE — 84425 ASSAY OF VITAMIN B-1: CPT | Performed by: OTHER

## 2019-04-08 PROCEDURE — 86334 IMMUNOFIX E-PHORESIS SERUM: CPT | Performed by: OTHER

## 2019-04-09 PROBLEM — E10.59 TYPE 1 DIABETES MELLITUS WITH OTHER CIRCULATORY COMPLICATION (HCC): Status: ACTIVE | Noted: 2019-04-09

## 2019-04-09 PROBLEM — I10 ESSENTIAL HYPERTENSION: Status: ACTIVE | Noted: 2019-04-09

## 2019-04-09 PROBLEM — E10.649 TYPE 1 DIABETES MELLITUS WITH HYPOGLYCEMIA AND WITHOUT COMA (HCC): Status: ACTIVE | Noted: 2019-04-09

## 2019-04-09 PROBLEM — E10.65 TYPE 1 DIABETES MELLITUS WITH HYPERGLYCEMIA (HCC): Status: ACTIVE | Noted: 2019-04-09

## 2019-04-09 PROBLEM — E78.2 MIXED HYPERLIPIDEMIA: Status: ACTIVE | Noted: 2019-04-09

## 2019-04-09 PROBLEM — M81.8 OTHER OSTEOPOROSIS WITHOUT CURRENT PATHOLOGICAL FRACTURE: Status: ACTIVE | Noted: 2019-04-09

## 2019-05-08 PROBLEM — G89.29 OTHER CHRONIC PAIN: Status: ACTIVE | Noted: 2019-05-08

## 2019-09-03 NOTE — ED INITIAL ASSESSMENT (HPI)
Pt was in the chair lift yesterday at landing of stairs and pt slipped off of the chair and twisted right leg and is having pain. Swelling and bruising noted to right lower extremity.
no

## 2019-09-10 PROBLEM — J44.9 COPD (CHRONIC OBSTRUCTIVE PULMONARY DISEASE) (HCC): Status: ACTIVE | Noted: 2019-09-10

## 2019-09-10 PROBLEM — I70.0 AORTO-ILIAC ATHEROSCLEROSIS (HCC): Status: ACTIVE | Noted: 2019-09-10

## 2019-09-10 PROBLEM — I70.8 AORTO-ILIAC ATHEROSCLEROSIS (HCC): Status: ACTIVE | Noted: 2019-09-10

## 2019-12-06 DIAGNOSIS — E11.42 DIABETIC POLYNEUROPATHY ASSOCIATED WITH TYPE 2 DIABETES MELLITUS (HCC): ICD-10-CM

## 2019-12-06 DIAGNOSIS — G56.22 ULNAR NERVE ENTRAPMENT AT ELBOW, LEFT: ICD-10-CM

## 2019-12-06 DIAGNOSIS — G56.92 NEUROPATHY OF LEFT HAND: ICD-10-CM

## 2019-12-06 RX ORDER — GABAPENTIN 100 MG/1
CAPSULE ORAL
Qty: 180 CAPSULE | Refills: 0 | OUTPATIENT
Start: 2019-12-06

## 2019-12-06 NOTE — TELEPHONE ENCOUNTER
Patient's  states are seeing Dr Wei Burnett now and pharmacy requested Rx from incorrect MD. Lubna Kat.

## 2020-01-01 ENCOUNTER — APPOINTMENT (OUTPATIENT)
Dept: ULTRASOUND IMAGING | Facility: HOSPITAL | Age: 75
DRG: 870 | End: 2020-01-01
Attending: INTERNAL MEDICINE
Payer: MEDICARE

## 2020-01-01 ENCOUNTER — SNF VISIT (OUTPATIENT)
Dept: INTERNAL MEDICINE CLINIC | Age: 75
End: 2020-01-01

## 2020-01-01 ENCOUNTER — TELEPHONE (OUTPATIENT)
Dept: INTERNAL MEDICINE CLINIC | Age: 75
End: 2020-01-01

## 2020-01-01 ENCOUNTER — APPOINTMENT (OUTPATIENT)
Dept: GENERAL RADIOLOGY | Facility: HOSPITAL | Age: 75
End: 2020-01-01
Attending: INTERNAL MEDICINE
Payer: MEDICARE

## 2020-01-01 ENCOUNTER — APPOINTMENT (OUTPATIENT)
Dept: CT IMAGING | Facility: HOSPITAL | Age: 75
DRG: 870 | End: 2020-01-01
Attending: NURSE PRACTITIONER
Payer: MEDICARE

## 2020-01-01 ENCOUNTER — APPOINTMENT (OUTPATIENT)
Dept: GENERAL RADIOLOGY | Facility: HOSPITAL | Age: 75
DRG: 870 | End: 2020-01-01
Attending: EMERGENCY MEDICINE
Payer: MEDICARE

## 2020-01-01 ENCOUNTER — APPOINTMENT (OUTPATIENT)
Dept: GENERAL RADIOLOGY | Facility: HOSPITAL | Age: 75
DRG: 870 | End: 2020-01-01
Attending: NURSE PRACTITIONER
Payer: MEDICARE

## 2020-01-01 ENCOUNTER — APPOINTMENT (OUTPATIENT)
Dept: GENERAL RADIOLOGY | Facility: HOSPITAL | Age: 75
DRG: 870 | End: 2020-01-01
Attending: INTERNAL MEDICINE
Payer: MEDICARE

## 2020-01-01 ENCOUNTER — HOSPITAL ENCOUNTER (INPATIENT)
Facility: HOSPITAL | Age: 75
LOS: 18 days | DRG: 870 | End: 2021-01-01
Attending: EMERGENCY MEDICINE | Admitting: HOSPITALIST
Payer: MEDICARE

## 2020-01-01 ENCOUNTER — APPOINTMENT (OUTPATIENT)
Dept: GENERAL RADIOLOGY | Facility: HOSPITAL | Age: 75
End: 2020-01-01
Attending: EMERGENCY MEDICINE
Payer: MEDICARE

## 2020-01-01 ENCOUNTER — SNF DISCHARGE (OUTPATIENT)
Dept: INTERNAL MEDICINE CLINIC | Age: 75
End: 2020-01-01

## 2020-01-01 ENCOUNTER — INITIAL APN SNF VISIT (OUTPATIENT)
Dept: INTERNAL MEDICINE CLINIC | Age: 75
End: 2020-01-01

## 2020-01-01 ENCOUNTER — APPOINTMENT (OUTPATIENT)
Dept: CT IMAGING | Facility: HOSPITAL | Age: 75
DRG: 870 | End: 2020-01-01
Attending: EMERGENCY MEDICINE
Payer: MEDICARE

## 2020-01-01 ENCOUNTER — TELEPHONE (OUTPATIENT)
Dept: NEPHROLOGY | Facility: CLINIC | Age: 75
End: 2020-01-01

## 2020-01-01 ENCOUNTER — HOSPITAL ENCOUNTER (OUTPATIENT)
Facility: HOSPITAL | Age: 75
Setting detail: OBSERVATION
Discharge: NURSING, REHAB OR LONG-TERM CARE | End: 2020-01-01
Attending: EMERGENCY MEDICINE | Admitting: INTERNAL MEDICINE
Payer: MEDICARE

## 2020-01-01 VITALS
DIASTOLIC BLOOD PRESSURE: 60 MMHG | HEART RATE: 83 BPM | SYSTOLIC BLOOD PRESSURE: 110 MMHG | RESPIRATION RATE: 16 BRPM | TEMPERATURE: 98 F

## 2020-01-01 VITALS
OXYGEN SATURATION: 97 % | DIASTOLIC BLOOD PRESSURE: 70 MMHG | SYSTOLIC BLOOD PRESSURE: 116 MMHG | HEART RATE: 75 BPM | TEMPERATURE: 98 F | RESPIRATION RATE: 18 BRPM

## 2020-01-01 VITALS
WEIGHT: 125.38 LBS | BODY MASS INDEX: 19 KG/M2 | OXYGEN SATURATION: 93 % | HEART RATE: 76 BPM | RESPIRATION RATE: 18 BRPM | SYSTOLIC BLOOD PRESSURE: 99 MMHG | TEMPERATURE: 98 F | DIASTOLIC BLOOD PRESSURE: 63 MMHG

## 2020-01-01 VITALS
DIASTOLIC BLOOD PRESSURE: 55 MMHG | SYSTOLIC BLOOD PRESSURE: 135 MMHG | HEART RATE: 65 BPM | WEIGHT: 125.69 LBS | TEMPERATURE: 98 F | RESPIRATION RATE: 18 BRPM | OXYGEN SATURATION: 98 % | BODY MASS INDEX: 19 KG/M2

## 2020-01-01 VITALS
RESPIRATION RATE: 18 BRPM | DIASTOLIC BLOOD PRESSURE: 66 MMHG | HEART RATE: 76 BPM | OXYGEN SATURATION: 95 % | SYSTOLIC BLOOD PRESSURE: 124 MMHG | TEMPERATURE: 98 F

## 2020-01-01 VITALS
TEMPERATURE: 98 F | SYSTOLIC BLOOD PRESSURE: 133 MMHG | DIASTOLIC BLOOD PRESSURE: 70 MMHG | RESPIRATION RATE: 18 BRPM | HEART RATE: 68 BPM

## 2020-01-01 VITALS
OXYGEN SATURATION: 96 % | TEMPERATURE: 98 F | SYSTOLIC BLOOD PRESSURE: 111 MMHG | DIASTOLIC BLOOD PRESSURE: 62 MMHG | RESPIRATION RATE: 18 BRPM | HEART RATE: 84 BPM

## 2020-01-01 VITALS
TEMPERATURE: 97 F | DIASTOLIC BLOOD PRESSURE: 60 MMHG | HEART RATE: 95 BPM | SYSTOLIC BLOOD PRESSURE: 125 MMHG | RESPIRATION RATE: 16 BRPM

## 2020-01-01 VITALS
TEMPERATURE: 99 F | OXYGEN SATURATION: 94 % | RESPIRATION RATE: 18 BRPM | HEART RATE: 97 BPM | SYSTOLIC BLOOD PRESSURE: 119 MMHG | BODY MASS INDEX: 20.46 KG/M2 | HEIGHT: 68 IN | DIASTOLIC BLOOD PRESSURE: 34 MMHG | WEIGHT: 135 LBS

## 2020-01-01 VITALS
DIASTOLIC BLOOD PRESSURE: 58 MMHG | RESPIRATION RATE: 18 BRPM | HEART RATE: 78 BPM | SYSTOLIC BLOOD PRESSURE: 103 MMHG | TEMPERATURE: 98 F | OXYGEN SATURATION: 95 %

## 2020-01-01 DIAGNOSIS — G35 MULTIPLE SCLEROSIS (HCC): ICD-10-CM

## 2020-01-01 DIAGNOSIS — E10.49 TYPE 1 DIABETES MELLITUS WITH OTHER NEUROLOGIC COMPLICATION (HCC): ICD-10-CM

## 2020-01-01 DIAGNOSIS — G89.29 CHRONIC PAIN OF RIGHT LOWER EXTREMITY: Primary | ICD-10-CM

## 2020-01-01 DIAGNOSIS — R09.81 NASAL CONGESTION: ICD-10-CM

## 2020-01-01 DIAGNOSIS — G89.29 CHRONIC PAIN OF RIGHT LOWER EXTREMITY: ICD-10-CM

## 2020-01-01 DIAGNOSIS — R79.9 ELEVATED BUN: ICD-10-CM

## 2020-01-01 DIAGNOSIS — M79.604 CHRONIC PAIN OF RIGHT LOWER EXTREMITY: ICD-10-CM

## 2020-01-01 DIAGNOSIS — M25.561 CHRONIC PAIN OF RIGHT KNEE: ICD-10-CM

## 2020-01-01 DIAGNOSIS — E87.5 CHRONIC HYPERKALEMIA: ICD-10-CM

## 2020-01-01 DIAGNOSIS — Z86.73 H/O: STROKE: ICD-10-CM

## 2020-01-01 DIAGNOSIS — Z91.89 AT HIGH RISK FOR HYPERKALEMIA: ICD-10-CM

## 2020-01-01 DIAGNOSIS — N30.00 ACUTE CYSTITIS WITHOUT HEMATURIA: ICD-10-CM

## 2020-01-01 DIAGNOSIS — Z74.09 IMPAIRED FUNCTIONAL MOBILITY AND ACTIVITY TOLERANCE: ICD-10-CM

## 2020-01-01 DIAGNOSIS — M79.604 CHRONIC PAIN OF RIGHT LOWER EXTREMITY: Primary | ICD-10-CM

## 2020-01-01 DIAGNOSIS — E03.8 OTHER SPECIFIED HYPOTHYROIDISM: ICD-10-CM

## 2020-01-01 DIAGNOSIS — J12.82 PNEUMONIA DUE TO COVID-19 VIRUS: Primary | ICD-10-CM

## 2020-01-01 DIAGNOSIS — A41.9 SEPSIS DUE TO URINARY TRACT INFECTION (HCC): ICD-10-CM

## 2020-01-01 DIAGNOSIS — F01.50 VASCULAR DEMENTIA WITHOUT BEHAVIORAL DISTURBANCE (HCC): ICD-10-CM

## 2020-01-01 DIAGNOSIS — M81.0 AGE-RELATED OSTEOPOROSIS WITHOUT CURRENT PATHOLOGICAL FRACTURE: ICD-10-CM

## 2020-01-01 DIAGNOSIS — U07.1 PNEUMONIA DUE TO COVID-19 VIRUS: Primary | ICD-10-CM

## 2020-01-01 DIAGNOSIS — E78.5 HYPERLIPIDEMIA, UNSPECIFIED HYPERLIPIDEMIA TYPE: ICD-10-CM

## 2020-01-01 DIAGNOSIS — R09.89 RUNNY NOSE: ICD-10-CM

## 2020-01-01 DIAGNOSIS — R06.7 SNEEZING WITH WATERY EYES: ICD-10-CM

## 2020-01-01 DIAGNOSIS — G89.29 CHRONIC PAIN OF RIGHT KNEE: ICD-10-CM

## 2020-01-01 DIAGNOSIS — I10 HYPERTENSION, UNSPECIFIED TYPE: ICD-10-CM

## 2020-01-01 DIAGNOSIS — E10.49 TYPE 1 DIABETES MELLITUS WITH OTHER NEUROLOGIC COMPLICATION (HCC): Primary | ICD-10-CM

## 2020-01-01 DIAGNOSIS — R79.89 ELEVATED SERUM CREATININE: ICD-10-CM

## 2020-01-01 DIAGNOSIS — N39.0 SEPSIS DUE TO URINARY TRACT INFECTION (HCC): ICD-10-CM

## 2020-01-01 DIAGNOSIS — H04.209 SNEEZING WITH WATERY EYES: ICD-10-CM

## 2020-01-01 DIAGNOSIS — R52 PAIN OF RIGHT SIDE OF BODY: Primary | ICD-10-CM

## 2020-01-01 LAB
ALBUMIN SERPL-MCNC: 1.1 G/DL (ref 3.4–5)
ALBUMIN SERPL-MCNC: 1.3 G/DL (ref 3.4–5)
ALBUMIN SERPL-MCNC: 1.4 G/DL (ref 3.4–5)
ALBUMIN SERPL-MCNC: 1.5 G/DL (ref 3.4–5)
ALBUMIN SERPL-MCNC: 1.5 G/DL (ref 3.4–5)
ALBUMIN SERPL-MCNC: 1.6 G/DL (ref 3.4–5)
ALBUMIN SERPL-MCNC: 1.7 G/DL (ref 3.4–5)
ALBUMIN SERPL-MCNC: 1.7 G/DL (ref 3.4–5)
ALBUMIN SERPL-MCNC: 1.8 G/DL (ref 3.4–5)
ALBUMIN SERPL-MCNC: 2.3 G/DL (ref 3.4–5)
ALBUMIN/GLOB SERPL: 0.3 {RATIO} (ref 1–2)
ALBUMIN/GLOB SERPL: 0.3 {RATIO} (ref 1–2)
ALBUMIN/GLOB SERPL: 0.4 {RATIO} (ref 1–2)
ALBUMIN/GLOB SERPL: 0.5 {RATIO} (ref 1–2)
ALBUMIN/GLOB SERPL: 0.5 {RATIO} (ref 1–2)
ALLENS TEST: POSITIVE
ALP LIVER SERPL-CCNC: 104 U/L
ALP LIVER SERPL-CCNC: 108 U/L
ALP LIVER SERPL-CCNC: 108 U/L
ALP LIVER SERPL-CCNC: 67 U/L
ALP LIVER SERPL-CCNC: 73 U/L
ALP LIVER SERPL-CCNC: 86 U/L
ALP LIVER SERPL-CCNC: 86 U/L
ALP LIVER SERPL-CCNC: 90 U/L
ALP LIVER SERPL-CCNC: 91 U/L
ALP LIVER SERPL-CCNC: 95 U/L
ALT SERPL-CCNC: 11 U/L
ALT SERPL-CCNC: 12 U/L
ALT SERPL-CCNC: 13 U/L
ALT SERPL-CCNC: 14 U/L
ALT SERPL-CCNC: 16 U/L
ALT SERPL-CCNC: 17 U/L
ALT SERPL-CCNC: 22 U/L
ALT SERPL-CCNC: 22 U/L
ALT SERPL-CCNC: 24 U/L
ALT SERPL-CCNC: 29 U/L
ANION GAP SERPL CALC-SCNC: 2 MMOL/L (ref 0–18)
ANION GAP SERPL CALC-SCNC: 3 MMOL/L (ref 0–18)
ANION GAP SERPL CALC-SCNC: 3 MMOL/L (ref 0–18)
ANION GAP SERPL CALC-SCNC: 4 MMOL/L (ref 0–18)
ANION GAP SERPL CALC-SCNC: 5 MMOL/L (ref 0–18)
ANION GAP SERPL CALC-SCNC: 6 MMOL/L (ref 0–18)
ANION GAP SERPL CALC-SCNC: 6 MMOL/L (ref 0–18)
ANION GAP SERPL CALC-SCNC: 7 MMOL/L (ref 0–18)
ANTIBODY SCREEN: POSITIVE
ANTIBODY SCREEN: POSITIVE
APTT PPP: 38.6 SECONDS (ref 25.4–36.1)
APTT PPP: 56.3 SECONDS (ref 25.4–36.1)
ARTERIAL BLD GAS O2 SATURATION: 86 % (ref 92–100)
ARTERIAL BLD GAS O2 SATURATION: 92 % (ref 92–100)
ARTERIAL BLD GAS O2 SATURATION: 98 % (ref 92–100)
ARTERIAL BLOOD GAS BASE EXCESS: -1.1 MMOL/L (ref ?–2)
ARTERIAL BLOOD GAS BASE EXCESS: -2.2 MMOL/L (ref ?–2)
ARTERIAL BLOOD GAS BASE EXCESS: -4.5 MMOL/L (ref ?–2)
ARTERIAL BLOOD GAS BASE EXCESS: -5.1 MMOL/L (ref ?–2)
ARTERIAL BLOOD GAS BASE EXCESS: -5.5 MMOL/L (ref ?–2)
ARTERIAL BLOOD GAS HCO3: 18 MEQ/L (ref 22–26)
ARTERIAL BLOOD GAS HCO3: 19.7 MEQ/L (ref 22–26)
ARTERIAL BLOOD GAS HCO3: 20.5 MEQ/L (ref 22–26)
ARTERIAL BLOOD GAS HCO3: 22.2 MEQ/L (ref 22–26)
ARTERIAL BLOOD GAS HCO3: 22.9 MEQ/L (ref 22–26)
ARTERIAL BLOOD GAS PCO2: 28 MM HG (ref 35–45)
ARTERIAL BLOOD GAS PCO2: 34 MM HG (ref 35–45)
ARTERIAL BLOOD GAS PCO2: 37 MM HG (ref 35–45)
ARTERIAL BLOOD GAS PH: 7.36 (ref 7.35–7.45)
ARTERIAL BLOOD GAS PH: 7.38 (ref 7.35–7.45)
ARTERIAL BLOOD GAS PH: 7.4 (ref 7.35–7.45)
ARTERIAL BLOOD GAS PH: 7.41 (ref 7.35–7.45)
ARTERIAL BLOOD GAS PH: 7.42 (ref 7.35–7.45)
ARTERIAL BLOOD GAS PO2: 233 MM HG (ref 80–105)
ARTERIAL BLOOD GAS PO2: 56 MM HG (ref 80–105)
ARTERIAL BLOOD GAS PO2: 57 MM HG (ref 80–105)
ARTERIAL BLOOD GAS PO2: 60 MM HG (ref 80–105)
ARTERIAL BLOOD GAS PO2: 66 MM HG (ref 80–105)
AST SERPL-CCNC: 10 U/L (ref 15–37)
AST SERPL-CCNC: 16 U/L (ref 15–37)
AST SERPL-CCNC: 17 U/L (ref 15–37)
AST SERPL-CCNC: 20 U/L (ref 15–37)
AST SERPL-CCNC: 29 U/L (ref 15–37)
AST SERPL-CCNC: 30 U/L (ref 15–37)
AST SERPL-CCNC: 30 U/L (ref 15–37)
AST SERPL-CCNC: 42 U/L (ref 15–37)
AST SERPL-CCNC: 45 U/L (ref 15–37)
AST SERPL-CCNC: 65 U/L (ref 15–37)
ATRIAL RATE: 77 BPM
BASOPHILS # BLD AUTO: 0 X10(3) UL (ref 0–0.2)
BASOPHILS # BLD AUTO: 0.01 X10(3) UL (ref 0–0.2)
BASOPHILS # BLD AUTO: 0.02 X10(3) UL (ref 0–0.2)
BASOPHILS # BLD AUTO: 0.03 X10(3) UL (ref 0–0.2)
BASOPHILS # BLD: 0 X10(3) UL (ref 0–0.2)
BASOPHILS NFR BLD AUTO: 0 %
BASOPHILS NFR BLD AUTO: 0.1 %
BASOPHILS NFR BLD AUTO: 0.1 %
BASOPHILS NFR BLD AUTO: 0.2 %
BASOPHILS NFR BLD AUTO: 0.3 %
BASOPHILS NFR BLD AUTO: 0.6 %
BASOPHILS NFR BLD: 0 %
BILIRUB SERPL-MCNC: 0.2 MG/DL (ref 0.1–2)
BILIRUB SERPL-MCNC: 0.3 MG/DL (ref 0.1–2)
BILIRUB SERPL-MCNC: 0.4 MG/DL (ref 0.1–2)
BILIRUB SERPL-MCNC: 0.4 MG/DL (ref 0.1–2)
BILIRUB UR QL STRIP.AUTO: NEGATIVE
BLOOD TYPE BARCODE: 7300
BUN BLD-MCNC: 11 MG/DL (ref 7–18)
BUN BLD-MCNC: 11 MG/DL (ref 7–18)
BUN BLD-MCNC: 12 MG/DL (ref 7–18)
BUN BLD-MCNC: 13 MG/DL (ref 7–18)
BUN BLD-MCNC: 13 MG/DL (ref 7–18)
BUN BLD-MCNC: 16 MG/DL (ref 7–18)
BUN BLD-MCNC: 16 MG/DL (ref 7–18)
BUN BLD-MCNC: 20 MG/DL (ref 7–18)
BUN BLD-MCNC: 24 MG/DL (ref 7–18)
BUN BLD-MCNC: 24 MG/DL (ref 7–18)
BUN BLD-MCNC: 26 MG/DL (ref 7–18)
BUN/CREAT SERPL: 13.8 (ref 10–20)
BUN/CREAT SERPL: 14.1 (ref 10–20)
BUN/CREAT SERPL: 14.3 (ref 10–20)
BUN/CREAT SERPL: 14.6 (ref 10–20)
BUN/CREAT SERPL: 17.3 (ref 10–20)
BUN/CREAT SERPL: 17.4 (ref 10–20)
BUN/CREAT SERPL: 17.6 (ref 10–20)
BUN/CREAT SERPL: 21.6 (ref 10–20)
BUN/CREAT SERPL: 23 (ref 10–20)
BUN/CREAT SERPL: 24.2 (ref 10–20)
BUN/CREAT SERPL: 30 (ref 10–20)
BUN/CREAT SERPL: 32 (ref 10–20)
BUN/CREAT SERPL: 32.8 (ref 10–20)
CALCIUM BLD-MCNC: 5.6 MG/DL (ref 8.5–10.1)
CALCIUM BLD-MCNC: 7.2 MG/DL (ref 8.5–10.1)
CALCIUM BLD-MCNC: 7.2 MG/DL (ref 8.5–10.1)
CALCIUM BLD-MCNC: 7.4 MG/DL (ref 8.5–10.1)
CALCIUM BLD-MCNC: 7.5 MG/DL (ref 8.5–10.1)
CALCIUM BLD-MCNC: 7.7 MG/DL (ref 8.5–10.1)
CALCIUM BLD-MCNC: 7.8 MG/DL (ref 8.5–10.1)
CALCIUM BLD-MCNC: 7.9 MG/DL (ref 8.5–10.1)
CALCIUM BLD-MCNC: 8 MG/DL (ref 8.5–10.1)
CALCIUM BLD-MCNC: 8.5 MG/DL (ref 8.5–10.1)
CALCIUM BLD-MCNC: 9.8 MG/DL (ref 8.5–10.1)
CALCULATED O2 SATURATION: 100 % (ref 92–100)
CALCULATED O2 SATURATION: 87 % (ref 92–100)
CALCULATED O2 SATURATION: 90 % (ref 92–100)
CALCULATED O2 SATURATION: 91 % (ref 92–100)
CALCULATED O2 SATURATION: 92 % (ref 92–100)
CARBOXYHEMOGLOBIN: 0.6 % SAT (ref 0–3)
CARBOXYHEMOGLOBIN: 0.7 % SAT (ref 0–3)
CARBOXYHEMOGLOBIN: 0.7 % SAT (ref 0–3)
CARBOXYHEMOGLOBIN: 0.8 % SAT (ref 0–3)
CARBOXYHEMOGLOBIN: 1 % SAT (ref 0–3)
CHLORIDE SERPL-SCNC: 105 MMOL/L (ref 98–112)
CHLORIDE SERPL-SCNC: 105 MMOL/L (ref 98–112)
CHLORIDE SERPL-SCNC: 107 MMOL/L (ref 98–112)
CHLORIDE SERPL-SCNC: 108 MMOL/L (ref 98–112)
CHLORIDE SERPL-SCNC: 110 MMOL/L (ref 98–112)
CHLORIDE SERPL-SCNC: 112 MMOL/L (ref 98–112)
CHLORIDE SERPL-SCNC: 114 MMOL/L (ref 98–112)
CHLORIDE SERPL-SCNC: 116 MMOL/L (ref 98–112)
CHLORIDE SERPL-SCNC: 119 MMOL/L (ref 98–112)
CHLORIDE SERPL-SCNC: 120 MMOL/L (ref 98–112)
CHLORIDE SERPL-SCNC: 122 MMOL/L (ref 98–112)
CHLORIDE SERPL-SCNC: 124 MMOL/L (ref 98–112)
CHLORIDE SERPL-SCNC: 128 MMOL/L (ref 98–112)
CK SERPL-CCNC: 82 U/L
CO2 SERPL-SCNC: 16 MMOL/L (ref 21–32)
CO2 SERPL-SCNC: 19 MMOL/L (ref 21–32)
CO2 SERPL-SCNC: 20 MMOL/L (ref 21–32)
CO2 SERPL-SCNC: 21 MMOL/L (ref 21–32)
CO2 SERPL-SCNC: 21 MMOL/L (ref 21–32)
CO2 SERPL-SCNC: 22 MMOL/L (ref 21–32)
CO2 SERPL-SCNC: 22 MMOL/L (ref 21–32)
CO2 SERPL-SCNC: 23 MMOL/L (ref 21–32)
CO2 SERPL-SCNC: 23 MMOL/L (ref 21–32)
CO2 SERPL-SCNC: 24 MMOL/L (ref 21–32)
CO2 SERPL-SCNC: 25 MMOL/L (ref 21–32)
CO2 SERPL-SCNC: 27 MMOL/L (ref 21–32)
CO2 SERPL-SCNC: 30 MMOL/L (ref 21–32)
CREAT BLD-MCNC: 0.5 MG/DL
CREAT BLD-MCNC: 0.61 MG/DL
CREAT BLD-MCNC: 0.66 MG/DL
CREAT BLD-MCNC: 0.68 MG/DL
CREAT BLD-MCNC: 0.68 MG/DL
CREAT BLD-MCNC: 0.69 MG/DL
CREAT BLD-MCNC: 0.75 MG/DL
CREAT BLD-MCNC: 0.77 MG/DL
CREAT BLD-MCNC: 0.78 MG/DL
CREAT BLD-MCNC: 0.78 MG/DL
CREAT BLD-MCNC: 0.8 MG/DL
CREAT BLD-MCNC: 0.82 MG/DL
CREAT BLD-MCNC: 0.82 MG/DL
CREAT BLD-MCNC: 0.94 MG/DL
CREAT BLD-MCNC: 1.11 MG/DL
CREAT BLD-MCNC: 1.13 MG/DL
CRP SERPL-MCNC: 12.1 MG/DL (ref ?–0.3)
CRP SERPL-MCNC: 12.7 MG/DL (ref ?–0.3)
CRP SERPL-MCNC: 18 MG/DL (ref ?–0.3)
CRP SERPL-MCNC: 18.6 MG/DL (ref ?–0.3)
CRP SERPL-MCNC: 20.9 MG/DL (ref ?–0.3)
CRP SERPL-MCNC: 22.1 MG/DL (ref ?–0.3)
CRP SERPL-MCNC: 24.3 MG/DL (ref ?–0.3)
CRP SERPL-MCNC: 26 MG/DL (ref ?–0.3)
CRP SERPL-MCNC: 7.63 MG/DL (ref ?–0.3)
CRP SERPL-MCNC: 8.95 MG/DL (ref ?–0.3)
CRP SERPL-MCNC: 9.94 MG/DL (ref ?–0.3)
D-DIMER: 1.26 UG/ML FEU (ref ?–0.74)
D-DIMER: 2.61 UG/ML FEU (ref ?–0.74)
D-DIMER: 2.75 UG/ML FEU (ref ?–0.74)
D-DIMER: 3.71 UG/ML FEU (ref ?–0.74)
D-DIMER: 3.82 UG/ML FEU (ref ?–0.74)
D-DIMER: 3.96 UG/ML FEU (ref ?–0.74)
D-DIMER: 3.97 UG/ML FEU (ref ?–0.74)
DEPRECATED HBV CORE AB SER IA-ACNC: 109.3 NG/ML
DEPRECATED HBV CORE AB SER IA-ACNC: 132.2 NG/ML
DEPRECATED HBV CORE AB SER IA-ACNC: 140 NG/ML
DEPRECATED HBV CORE AB SER IA-ACNC: 154.6 NG/ML
DEPRECATED HBV CORE AB SER IA-ACNC: 157.4 NG/ML
DEPRECATED HBV CORE AB SER IA-ACNC: 162.9 NG/ML
DEPRECATED HBV CORE AB SER IA-ACNC: 177.4 NG/ML
DEPRECATED HBV CORE AB SER IA-ACNC: 178.2 NG/ML
DEPRECATED HBV CORE AB SER IA-ACNC: 207.3 NG/ML
DEPRECATED HBV CORE AB SER IA-ACNC: 223.3 NG/ML
DEPRECATED HBV CORE AB SER IA-ACNC: 241.8 NG/ML
DEPRECATED RDW RBC AUTO: 46 FL (ref 35.1–46.3)
DEPRECATED RDW RBC AUTO: 46.1 FL (ref 35.1–46.3)
DEPRECATED RDW RBC AUTO: 46.4 FL (ref 35.1–46.3)
DEPRECATED RDW RBC AUTO: 47 FL (ref 35.1–46.3)
DEPRECATED RDW RBC AUTO: 47.3 FL (ref 35.1–46.3)
DEPRECATED RDW RBC AUTO: 47.5 FL (ref 35.1–46.3)
DEPRECATED RDW RBC AUTO: 48.3 FL (ref 35.1–46.3)
DEPRECATED RDW RBC AUTO: 48.6 FL (ref 35.1–46.3)
DEPRECATED RDW RBC AUTO: 49.6 FL (ref 35.1–46.3)
DEPRECATED RDW RBC AUTO: 50.5 FL (ref 35.1–46.3)
DEPRECATED RDW RBC AUTO: 50.5 FL (ref 35.1–46.3)
DEPRECATED RDW RBC AUTO: 50.9 FL (ref 35.1–46.3)
ELUATE RESULT: POSITIVE
EOSINOPHIL # BLD AUTO: 0 X10(3) UL (ref 0–0.7)
EOSINOPHIL # BLD AUTO: 0.03 X10(3) UL (ref 0–0.7)
EOSINOPHIL # BLD AUTO: 0.06 X10(3) UL (ref 0–0.7)
EOSINOPHIL # BLD AUTO: 0.08 X10(3) UL (ref 0–0.7)
EOSINOPHIL # BLD: 0 X10(3) UL (ref 0–0.7)
EOSINOPHIL NFR BLD AUTO: 0 %
EOSINOPHIL NFR BLD AUTO: 0.2 %
EOSINOPHIL NFR BLD AUTO: 0.8 %
EOSINOPHIL NFR BLD AUTO: 0.9 %
EOSINOPHIL NFR BLD: 0 %
ERYTHROCYTE [DISTWIDTH] IN BLOOD BY AUTOMATED COUNT: 13.4 % (ref 11–15)
ERYTHROCYTE [DISTWIDTH] IN BLOOD BY AUTOMATED COUNT: 13.5 % (ref 11–15)
ERYTHROCYTE [DISTWIDTH] IN BLOOD BY AUTOMATED COUNT: 13.6 % (ref 11–15)
ERYTHROCYTE [DISTWIDTH] IN BLOOD BY AUTOMATED COUNT: 13.6 % (ref 11–15)
ERYTHROCYTE [DISTWIDTH] IN BLOOD BY AUTOMATED COUNT: 13.7 % (ref 11–15)
ERYTHROCYTE [DISTWIDTH] IN BLOOD BY AUTOMATED COUNT: 14 % (ref 11–15)
ERYTHROCYTE [DISTWIDTH] IN BLOOD BY AUTOMATED COUNT: 14.1 % (ref 11–15)
ERYTHROCYTE [DISTWIDTH] IN BLOOD BY AUTOMATED COUNT: 14.3 % (ref 11–15)
FIO2: 100 %
FIO2: 40 %
FIO2: 60 %
GLOBULIN PLAS-MCNC: 2.6 G/DL (ref 2.8–4.4)
GLOBULIN PLAS-MCNC: 3.5 G/DL (ref 2.8–4.4)
GLOBULIN PLAS-MCNC: 3.7 G/DL (ref 2.8–4.4)
GLOBULIN PLAS-MCNC: 3.7 G/DL (ref 2.8–4.4)
GLOBULIN PLAS-MCNC: 3.9 G/DL (ref 2.8–4.4)
GLOBULIN PLAS-MCNC: 4.1 G/DL (ref 2.8–4.4)
GLOBULIN PLAS-MCNC: 4.2 G/DL (ref 2.8–4.4)
GLOBULIN PLAS-MCNC: 4.4 G/DL (ref 2.8–4.4)
GLUCOSE BLD-MCNC: 101 MG/DL (ref 70–99)
GLUCOSE BLD-MCNC: 104 MG/DL (ref 70–99)
GLUCOSE BLD-MCNC: 105 MG/DL (ref 70–99)
GLUCOSE BLD-MCNC: 112 MG/DL (ref 70–99)
GLUCOSE BLD-MCNC: 115 MG/DL (ref 70–99)
GLUCOSE BLD-MCNC: 120 MG/DL (ref 70–99)
GLUCOSE BLD-MCNC: 128 MG/DL (ref 70–99)
GLUCOSE BLD-MCNC: 136 MG/DL (ref 70–99)
GLUCOSE BLD-MCNC: 145 MG/DL (ref 70–99)
GLUCOSE BLD-MCNC: 165 MG/DL (ref 70–99)
GLUCOSE BLD-MCNC: 166 MG/DL (ref 70–99)
GLUCOSE BLD-MCNC: 177 MG/DL (ref 70–99)
GLUCOSE BLD-MCNC: 177 MG/DL (ref 70–99)
GLUCOSE BLD-MCNC: 181 MG/DL (ref 70–99)
GLUCOSE BLD-MCNC: 183 MG/DL (ref 70–99)
GLUCOSE BLD-MCNC: 184 MG/DL (ref 70–99)
GLUCOSE BLD-MCNC: 188 MG/DL (ref 70–99)
GLUCOSE BLD-MCNC: 191 MG/DL (ref 70–99)
GLUCOSE BLD-MCNC: 192 MG/DL (ref 70–99)
GLUCOSE BLD-MCNC: 196 MG/DL (ref 70–99)
GLUCOSE BLD-MCNC: 198 MG/DL (ref 70–99)
GLUCOSE BLD-MCNC: 200 MG/DL (ref 70–99)
GLUCOSE BLD-MCNC: 203 MG/DL (ref 70–99)
GLUCOSE BLD-MCNC: 207 MG/DL (ref 70–99)
GLUCOSE BLD-MCNC: 211 MG/DL (ref 70–99)
GLUCOSE BLD-MCNC: 212 MG/DL (ref 70–99)
GLUCOSE BLD-MCNC: 216 MG/DL (ref 70–99)
GLUCOSE BLD-MCNC: 220 MG/DL (ref 70–99)
GLUCOSE BLD-MCNC: 222 MG/DL (ref 70–99)
GLUCOSE BLD-MCNC: 227 MG/DL (ref 70–99)
GLUCOSE BLD-MCNC: 233 MG/DL (ref 70–99)
GLUCOSE BLD-MCNC: 236 MG/DL (ref 70–99)
GLUCOSE BLD-MCNC: 236 MG/DL (ref 70–99)
GLUCOSE BLD-MCNC: 240 MG/DL (ref 70–99)
GLUCOSE BLD-MCNC: 241 MG/DL (ref 70–99)
GLUCOSE BLD-MCNC: 242 MG/DL (ref 70–99)
GLUCOSE BLD-MCNC: 242 MG/DL (ref 70–99)
GLUCOSE BLD-MCNC: 247 MG/DL (ref 70–99)
GLUCOSE BLD-MCNC: 257 MG/DL (ref 70–99)
GLUCOSE BLD-MCNC: 258 MG/DL (ref 70–99)
GLUCOSE BLD-MCNC: 262 MG/DL (ref 70–99)
GLUCOSE BLD-MCNC: 264 MG/DL (ref 70–99)
GLUCOSE BLD-MCNC: 268 MG/DL (ref 70–99)
GLUCOSE BLD-MCNC: 276 MG/DL (ref 70–99)
GLUCOSE BLD-MCNC: 286 MG/DL (ref 70–99)
GLUCOSE BLD-MCNC: 286 MG/DL (ref 70–99)
GLUCOSE BLD-MCNC: 292 MG/DL (ref 70–99)
GLUCOSE BLD-MCNC: 293 MG/DL (ref 70–99)
GLUCOSE BLD-MCNC: 305 MG/DL (ref 70–99)
GLUCOSE BLD-MCNC: 305 MG/DL (ref 70–99)
GLUCOSE BLD-MCNC: 308 MG/DL (ref 70–99)
GLUCOSE BLD-MCNC: 330 MG/DL (ref 70–99)
GLUCOSE BLD-MCNC: 348 MG/DL (ref 70–99)
GLUCOSE BLD-MCNC: 352 MG/DL (ref 70–99)
GLUCOSE BLD-MCNC: 368 MG/DL (ref 70–99)
GLUCOSE BLD-MCNC: 369 MG/DL (ref 70–99)
GLUCOSE BLD-MCNC: 37 MG/DL (ref 70–99)
GLUCOSE BLD-MCNC: 379 MG/DL (ref 70–99)
GLUCOSE BLD-MCNC: 380 MG/DL (ref 70–99)
GLUCOSE BLD-MCNC: 381 MG/DL (ref 70–99)
GLUCOSE BLD-MCNC: 45 MG/DL (ref 70–99)
GLUCOSE BLD-MCNC: 47 MG/DL (ref 70–99)
GLUCOSE BLD-MCNC: 57 MG/DL (ref 70–99)
GLUCOSE BLD-MCNC: 89 MG/DL (ref 70–99)
GLUCOSE UR STRIP.AUTO-MCNC: 50 MG/DL
GRAN CASTS #/AREA URNS LPF: PRESENT /LPF
HAV IGM SER QL: 1.4 MG/DL (ref 1.6–2.6)
HAV IGM SER QL: 1.8 MG/DL (ref 1.6–2.6)
HAV IGM SER QL: 1.9 MG/DL (ref 1.6–2.6)
HAV IGM SER QL: 2 MG/DL (ref 1.6–2.6)
HAV IGM SER QL: 2.1 MG/DL (ref 1.6–2.6)
HAV IGM SER QL: 2.1 MG/DL (ref 1.6–2.6)
HAV IGM SER QL: 2.2 MG/DL (ref 1.6–2.6)
HAV IGM SER QL: 2.3 MG/DL (ref 1.6–2.6)
HAV IGM SER QL: 2.3 MG/DL (ref 1.6–2.6)
HCT VFR BLD AUTO: 19.9 %
HCT VFR BLD AUTO: 23.8 %
HCT VFR BLD AUTO: 25.4 %
HCT VFR BLD AUTO: 25.5 %
HCT VFR BLD AUTO: 25.6 %
HCT VFR BLD AUTO: 26.7 %
HCT VFR BLD AUTO: 26.8 %
HCT VFR BLD AUTO: 26.9 %
HCT VFR BLD AUTO: 28.1 %
HCT VFR BLD AUTO: 29 %
HCT VFR BLD AUTO: 29.1 %
HCT VFR BLD AUTO: 32 %
HGB BLD-MCNC: 10.1 G/DL
HGB BLD-MCNC: 6.4 G/DL
HGB BLD-MCNC: 6.6 G/DL
HGB BLD-MCNC: 7.7 G/DL
HGB BLD-MCNC: 8.2 G/DL
HGB BLD-MCNC: 8.4 G/DL
HGB BLD-MCNC: 8.6 G/DL
HGB BLD-MCNC: 8.8 G/DL
HGB BLD-MCNC: 8.8 G/DL
HGB BLD-MCNC: 8.9 G/DL
HGB BLD-MCNC: 9.2 G/DL
HGB BLD-MCNC: 9.3 G/DL
HGB BLD-MCNC: 9.3 G/DL
IMM GRANULOCYTES # BLD AUTO: 0.01 X10(3) UL (ref 0–1)
IMM GRANULOCYTES # BLD AUTO: 0.04 X10(3) UL (ref 0–1)
IMM GRANULOCYTES # BLD AUTO: 0.04 X10(3) UL (ref 0–1)
IMM GRANULOCYTES # BLD AUTO: 0.06 X10(3) UL (ref 0–1)
IMM GRANULOCYTES # BLD AUTO: 0.07 X10(3) UL (ref 0–1)
IMM GRANULOCYTES # BLD AUTO: 0.08 X10(3) UL (ref 0–1)
IMM GRANULOCYTES # BLD AUTO: 0.08 X10(3) UL (ref 0–1)
IMM GRANULOCYTES # BLD AUTO: 0.11 X10(3) UL (ref 0–1)
IMM GRANULOCYTES NFR BLD: 0.3 %
IMM GRANULOCYTES NFR BLD: 0.5 %
IMM GRANULOCYTES NFR BLD: 0.6 %
IMM GRANULOCYTES NFR BLD: 0.6 %
IMM GRANULOCYTES NFR BLD: 0.8 %
IMM GRANULOCYTES NFR BLD: 0.9 %
IMM GRANULOCYTES NFR BLD: 1 %
IMM GRANULOCYTES NFR BLD: 1.3 %
INR BLD: 1.25 (ref 0.89–1.11)
INR BLD: 1.26 (ref 0.89–1.11)
IONIZED CALCIUM: 1.13 MMOL/L (ref 1.12–1.32)
IONIZED CALCIUM: 1.16 MMOL/L (ref 1.12–1.32)
IONIZED CALCIUM: 1.36 MMOL/L (ref 1.12–1.32)
IONIZED CALCIUM: 1.42 MMOL/L (ref 1.12–1.32)
LACTATE SERPL-SCNC: 1.6 MMOL/L (ref 0.4–2)
LACTATE SERPL-SCNC: 3.1 MMOL/L (ref 0.4–2)
LACTIC ACID ARTERIAL: 1.8 MMOL/L (ref 0.5–2)
LACTIC ACID ARTERIAL: 2.2 MMOL/L (ref 0.5–2)
LACTIC ACID ARTERIAL: 2.9 MMOL/L (ref 0.5–2)
LACTIC ACID ARTERIAL: <1.6 MMOL/L (ref 0.5–2)
LDH SERPL L TO P-CCNC: 244 U/L
LDH SERPL L TO P-CCNC: 263 U/L
LDH SERPL L TO P-CCNC: 293 U/L
LDH SERPL L TO P-CCNC: 293 U/L
LDH SERPL L TO P-CCNC: 307 U/L
LDH SERPL L TO P-CCNC: 333 U/L
LDH SERPL L TO P-CCNC: 337 U/L
LDH SERPL L TO P-CCNC: 340 U/L
LDH SERPL L TO P-CCNC: 364 U/L
LDH SERPL L TO P-CCNC: 365 U/L
LDH SERPL L TO P-CCNC: 380 U/L
LEUKOCYTE ESTERASE UR QL STRIP.AUTO: NEGATIVE
LYMPHOCYTES # BLD AUTO: 0.41 X10(3) UL (ref 1–4)
LYMPHOCYTES # BLD AUTO: 0.41 X10(3) UL (ref 1–4)
LYMPHOCYTES # BLD AUTO: 0.47 X10(3) UL (ref 1–4)
LYMPHOCYTES # BLD AUTO: 0.48 X10(3) UL (ref 1–4)
LYMPHOCYTES # BLD AUTO: 0.51 X10(3) UL (ref 1–4)
LYMPHOCYTES # BLD AUTO: 0.54 X10(3) UL (ref 1–4)
LYMPHOCYTES # BLD AUTO: 0.74 X10(3) UL (ref 1–4)
LYMPHOCYTES # BLD AUTO: 0.81 X10(3) UL (ref 1–4)
LYMPHOCYTES NFR BLD AUTO: 13.1 %
LYMPHOCYTES NFR BLD AUTO: 4.7 %
LYMPHOCYTES NFR BLD AUTO: 5 %
LYMPHOCYTES NFR BLD AUTO: 5.9 %
LYMPHOCYTES NFR BLD AUTO: 6.4 %
LYMPHOCYTES NFR BLD AUTO: 8 %
LYMPHOCYTES NFR BLD AUTO: 8.5 %
LYMPHOCYTES NFR BLD AUTO: 8.6 %
LYMPHOCYTES NFR BLD: 0.37 X10(3) UL (ref 1–4)
LYMPHOCYTES NFR BLD: 0.5 X10(3) UL (ref 1–4)
LYMPHOCYTES NFR BLD: 0.57 X10(3) UL (ref 1–4)
LYMPHOCYTES NFR BLD: 11 %
LYMPHOCYTES NFR BLD: 6 %
LYMPHOCYTES NFR BLD: 7 %
M PROTEIN MFR SERPL ELPH: 3.7 G/DL (ref 6.4–8.2)
M PROTEIN MFR SERPL ELPH: 5 G/DL (ref 6.4–8.2)
M PROTEIN MFR SERPL ELPH: 5.2 G/DL (ref 6.4–8.2)
M PROTEIN MFR SERPL ELPH: 5.4 G/DL (ref 6.4–8.2)
M PROTEIN MFR SERPL ELPH: 5.5 G/DL (ref 6.4–8.2)
M PROTEIN MFR SERPL ELPH: 5.5 G/DL (ref 6.4–8.2)
M PROTEIN MFR SERPL ELPH: 5.6 G/DL (ref 6.4–8.2)
M PROTEIN MFR SERPL ELPH: 5.8 G/DL (ref 6.4–8.2)
M PROTEIN MFR SERPL ELPH: 6 G/DL (ref 6.4–8.2)
M PROTEIN MFR SERPL ELPH: 6.5 G/DL (ref 6.4–8.2)
MCH RBC QN AUTO: 30 PG (ref 26–34)
MCH RBC QN AUTO: 30 PG (ref 26–34)
MCH RBC QN AUTO: 30.1 PG (ref 26–34)
MCH RBC QN AUTO: 30.2 PG (ref 26–34)
MCH RBC QN AUTO: 30.3 PG (ref 26–34)
MCH RBC QN AUTO: 30.6 PG (ref 26–34)
MCH RBC QN AUTO: 30.6 PG (ref 26–34)
MCH RBC QN AUTO: 30.7 PG (ref 26–34)
MCH RBC QN AUTO: 30.8 PG (ref 26–34)
MCH RBC QN AUTO: 31 PG (ref 26–34)
MCHC RBC AUTO-ENTMCNC: 31.5 G/DL (ref 31–37)
MCHC RBC AUTO-ENTMCNC: 31.6 G/DL (ref 31–37)
MCHC RBC AUTO-ENTMCNC: 32 G/DL (ref 31–37)
MCHC RBC AUTO-ENTMCNC: 32.1 G/DL (ref 31–37)
MCHC RBC AUTO-ENTMCNC: 32.2 G/DL (ref 31–37)
MCHC RBC AUTO-ENTMCNC: 32.2 G/DL (ref 31–37)
MCHC RBC AUTO-ENTMCNC: 32.4 G/DL (ref 31–37)
MCHC RBC AUTO-ENTMCNC: 32.7 G/DL (ref 31–37)
MCHC RBC AUTO-ENTMCNC: 32.7 G/DL (ref 31–37)
MCHC RBC AUTO-ENTMCNC: 32.8 G/DL (ref 31–37)
MCHC RBC AUTO-ENTMCNC: 33.1 G/DL (ref 31–37)
MCHC RBC AUTO-ENTMCNC: 33.2 G/DL (ref 31–37)
MCV RBC AUTO: 91.7 FL
MCV RBC AUTO: 91.8 FL
MCV RBC AUTO: 92.1 FL
MCV RBC AUTO: 92.4 FL
MCV RBC AUTO: 93.4 FL
MCV RBC AUTO: 93.4 FL
MCV RBC AUTO: 93.7 FL
MCV RBC AUTO: 95.4 FL
MCV RBC AUTO: 95.7 FL
MCV RBC AUTO: 95.7 FL
MCV RBC AUTO: 96.1 FL
MCV RBC AUTO: 97 FL
METHEMOGLOBIN: 0.4 % SAT (ref 0.4–1.5)
METHEMOGLOBIN: 0.5 % SAT (ref 0.4–1.5)
METHEMOGLOBIN: 0.7 % SAT (ref 0.4–1.5)
MONOCYTES # BLD AUTO: 0.05 X10(3) UL (ref 0.1–1)
MONOCYTES # BLD AUTO: 0.1 X10(3) UL (ref 0.1–1)
MONOCYTES # BLD AUTO: 0.2 X10(3) UL (ref 0.1–1)
MONOCYTES # BLD AUTO: 0.22 X10(3) UL (ref 0.1–1)
MONOCYTES # BLD AUTO: 0.22 X10(3) UL (ref 0.1–1)
MONOCYTES # BLD AUTO: 0.31 X10(3) UL (ref 0.1–1)
MONOCYTES # BLD AUTO: 0.49 X10(3) UL (ref 0.1–1)
MONOCYTES # BLD AUTO: 0.79 X10(3) UL (ref 0.1–1)
MONOCYTES # BLD: 0 X10(3) UL (ref 0.1–1)
MONOCYTES # BLD: 0 X10(3) UL (ref 0.1–1)
MONOCYTES # BLD: 0.22 X10(3) UL (ref 0.1–1)
MONOCYTES NFR BLD AUTO: 1.2 %
MONOCYTES NFR BLD AUTO: 1.6 %
MONOCYTES NFR BLD AUTO: 2.5 %
MONOCYTES NFR BLD AUTO: 3 %
MONOCYTES NFR BLD AUTO: 3.5 %
MONOCYTES NFR BLD AUTO: 4 %
MONOCYTES NFR BLD AUTO: 5.3 %
MONOCYTES NFR BLD AUTO: 5.8 %
MONOCYTES NFR BLD: 0 %
MONOCYTES NFR BLD: 0 %
MONOCYTES NFR BLD: 3 %
MORPHOLOGY: NORMAL
NEUTROPHILS # BLD AUTO: 11.86 X10 (3) UL (ref 1.5–7.7)
NEUTROPHILS # BLD AUTO: 11.86 X10(3) UL (ref 1.5–7.7)
NEUTROPHILS # BLD AUTO: 2.65 X10 (3) UL (ref 1.5–7.7)
NEUTROPHILS # BLD AUTO: 2.65 X10(3) UL (ref 1.5–7.7)
NEUTROPHILS # BLD AUTO: 4.45 X10 (3) UL (ref 1.5–7.7)
NEUTROPHILS # BLD AUTO: 4.78 X10 (3) UL (ref 1.5–7.7)
NEUTROPHILS # BLD AUTO: 4.78 X10(3) UL (ref 1.5–7.7)
NEUTROPHILS # BLD AUTO: 5.41 X10 (3) UL (ref 1.5–7.7)
NEUTROPHILS # BLD AUTO: 5.47 X10 (3) UL (ref 1.5–7.7)
NEUTROPHILS # BLD AUTO: 5.47 X10(3) UL (ref 1.5–7.7)
NEUTROPHILS # BLD AUTO: 6.23 X10 (3) UL (ref 1.5–7.7)
NEUTROPHILS # BLD AUTO: 7.11 X10 (3) UL (ref 1.5–7.7)
NEUTROPHILS # BLD AUTO: 7.11 X10(3) UL (ref 1.5–7.7)
NEUTROPHILS # BLD AUTO: 7.56 X10 (3) UL (ref 1.5–7.7)
NEUTROPHILS # BLD AUTO: 7.56 X10(3) UL (ref 1.5–7.7)
NEUTROPHILS # BLD AUTO: 7.85 X10 (3) UL (ref 1.5–7.7)
NEUTROPHILS # BLD AUTO: 7.85 X10(3) UL (ref 1.5–7.7)
NEUTROPHILS # BLD AUTO: 9.31 X10 (3) UL (ref 1.5–7.7)
NEUTROPHILS # BLD AUTO: 9.31 X10(3) UL (ref 1.5–7.7)
NEUTROPHILS NFR BLD AUTO: 84.4 %
NEUTROPHILS NFR BLD AUTO: 84.7 %
NEUTROPHILS NFR BLD AUTO: 86.2 %
NEUTROPHILS NFR BLD AUTO: 87.1 %
NEUTROPHILS NFR BLD AUTO: 87.1 %
NEUTROPHILS NFR BLD AUTO: 89 %
NEUTROPHILS NFR BLD AUTO: 91.6 %
NEUTROPHILS NFR BLD AUTO: 93.2 %
NEUTROPHILS NFR BLD: 52 %
NEUTROPHILS NFR BLD: 81 %
NEUTROPHILS NFR BLD: 86 %
NEUTS BAND NFR BLD: 37 %
NEUTS BAND NFR BLD: 8 %
NEUTS BAND NFR BLD: 9 %
NEUTS HYPERSEG # BLD: 4.63 X10(3) UL (ref 1.5–7.7)
NEUTS HYPERSEG # BLD: 5.73 X10(3) UL (ref 1.5–7.7)
NEUTS HYPERSEG # BLD: 6.48 X10(3) UL (ref 1.5–7.7)
NITRITE UR QL STRIP.AUTO: NEGATIVE
NT-PROBNP SERPL-MCNC: 3994 PG/ML (ref ?–125)
OSMOLALITY SERPL CALC.SUM OF ELEC: 286 MOSM/KG (ref 275–295)
OSMOLALITY SERPL CALC.SUM OF ELEC: 287 MOSM/KG (ref 275–295)
OSMOLALITY SERPL CALC.SUM OF ELEC: 289 MOSM/KG (ref 275–295)
OSMOLALITY SERPL CALC.SUM OF ELEC: 290 MOSM/KG (ref 275–295)
OSMOLALITY SERPL CALC.SUM OF ELEC: 292 MOSM/KG (ref 275–295)
OSMOLALITY SERPL CALC.SUM OF ELEC: 293 MOSM/KG (ref 275–295)
OSMOLALITY SERPL CALC.SUM OF ELEC: 296 MOSM/KG (ref 275–295)
OSMOLALITY SERPL CALC.SUM OF ELEC: 299 MOSM/KG (ref 275–295)
OSMOLALITY SERPL CALC.SUM OF ELEC: 307 MOSM/KG (ref 275–295)
OSMOLALITY SERPL CALC.SUM OF ELEC: 309 MOSM/KG (ref 275–295)
OSMOLALITY SERPL CALC.SUM OF ELEC: 311 MOSM/KG (ref 275–295)
OSMOLALITY SERPL CALC.SUM OF ELEC: 314 MOSM/KG (ref 275–295)
OSMOLALITY SERPL CALC.SUM OF ELEC: 316 MOSM/KG (ref 275–295)
P AXIS: 74 DEGREES
P-R INTERVAL: 148 MS
P/F RATIO: 100.8 MMHG
P/F RATIO: 147.8 MMHG
P/F RATIO: 50.6 MMHG
PATIENT TEMPERATURE: 101 F
PATIENT TEMPERATURE: 97.1 F
PATIENT TEMPERATURE: 98.6 F
PATIENT TEMPERATURE: 98.8 F
PATIENT TEMPERATURE: 99.6 F
PEEP: 10 CM H2O
PEEP: 15 CM H2O
PEEP: 5 CM H2O
PEEP: 8 CM H2O
PH UR STRIP.AUTO: 5 [PH] (ref 4.5–8)
PHOSPHATE SERPL-MCNC: 0.9 MG/DL (ref 2.5–4.9)
PHOSPHATE SERPL-MCNC: 1.1 MG/DL (ref 2.5–4.9)
PHOSPHATE SERPL-MCNC: 1.2 MG/DL (ref 2.5–4.9)
PHOSPHATE SERPL-MCNC: 1.8 MG/DL (ref 2.5–4.9)
PHOSPHATE SERPL-MCNC: 2 MG/DL (ref 2.5–4.9)
PHOSPHATE SERPL-MCNC: 2.1 MG/DL (ref 2.5–4.9)
PHOSPHATE SERPL-MCNC: 2.5 MG/DL (ref 2.5–4.9)
PHOSPHATE SERPL-MCNC: 2.7 MG/DL (ref 2.5–4.9)
PLATELET # BLD AUTO: 167 10(3)UL (ref 150–450)
PLATELET # BLD AUTO: 194 10(3)UL (ref 150–450)
PLATELET # BLD AUTO: 219 10(3)UL (ref 150–450)
PLATELET # BLD AUTO: 253 10(3)UL (ref 150–450)
PLATELET # BLD AUTO: 258 10(3)UL (ref 150–450)
PLATELET # BLD AUTO: 262 10(3)UL (ref 150–450)
PLATELET # BLD AUTO: 287 10(3)UL (ref 150–450)
PLATELET # BLD AUTO: 315 10(3)UL (ref 150–450)
PLATELET # BLD AUTO: 319 10(3)UL (ref 150–450)
PLATELET # BLD AUTO: 321 10(3)UL (ref 150–450)
PLATELET # BLD AUTO: 326 10(3)UL (ref 150–450)
PLATELET # BLD AUTO: 373 10(3)UL (ref 150–450)
PLATELET MORPHOLOGY: NORMAL
POTASSIUM BLOOD GAS: 2.8 MMOL/L (ref 3.6–5.1)
POTASSIUM BLOOD GAS: 2.8 MMOL/L (ref 3.6–5.1)
POTASSIUM BLOOD GAS: 2.9 MMOL/L (ref 3.6–5.1)
POTASSIUM BLOOD GAS: 3.9 MMOL/L (ref 3.6–5.1)
POTASSIUM SERPL-SCNC: 2.9 MMOL/L (ref 3.5–5.1)
POTASSIUM SERPL-SCNC: 3 MMOL/L (ref 3.5–5.1)
POTASSIUM SERPL-SCNC: 3.1 MMOL/L (ref 3.5–5.1)
POTASSIUM SERPL-SCNC: 3.1 MMOL/L (ref 3.5–5.1)
POTASSIUM SERPL-SCNC: 3.3 MMOL/L (ref 3.5–5.1)
POTASSIUM SERPL-SCNC: 3.5 MMOL/L (ref 3.5–5.1)
POTASSIUM SERPL-SCNC: 3.6 MMOL/L (ref 3.5–5.1)
POTASSIUM SERPL-SCNC: 3.8 MMOL/L (ref 3.5–5.1)
POTASSIUM SERPL-SCNC: 3.8 MMOL/L (ref 3.5–5.1)
POTASSIUM SERPL-SCNC: 4 MMOL/L (ref 3.5–5.1)
POTASSIUM SERPL-SCNC: 4 MMOL/L (ref 3.5–5.1)
POTASSIUM SERPL-SCNC: 4.1 MMOL/L (ref 3.5–5.1)
POTASSIUM SERPL-SCNC: 4.2 MMOL/L (ref 3.5–5.1)
POTASSIUM SERPL-SCNC: 4.4 MMOL/L (ref 3.5–5.1)
POTASSIUM SERPL-SCNC: 5.2 MMOL/L (ref 3.5–5.1)
PRESSURE SUPPORT: 5 CM H2O
PROCALCITONIN SERPL-MCNC: 3.63 NG/ML (ref ?–0.16)
PROT UR STRIP.AUTO-MCNC: 100 MG/DL
PSA SERPL DL<=0.01 NG/ML-MCNC: 16.1 SECONDS (ref 12.4–14.6)
PSA SERPL DL<=0.01 NG/ML-MCNC: 16.2 SECONDS (ref 12.4–14.6)
Q-T INTERVAL: 382 MS
QRS DURATION: 78 MS
QTC CALCULATION (BEZET): 432 MS
R AXIS: 34 DEGREES
RBC # BLD AUTO: 2.08 X10(6)UL
RBC # BLD AUTO: 2.54 X10(6)UL
RBC # BLD AUTO: 2.73 X10(6)UL
RBC # BLD AUTO: 2.77 X10(6)UL
RBC # BLD AUTO: 2.79 X10(6)UL
RBC # BLD AUTO: 2.8 X10(6)UL
RBC # BLD AUTO: 2.91 X10(6)UL
RBC # BLD AUTO: 2.91 X10(6)UL
RBC # BLD AUTO: 3 X10(6)UL
RBC # BLD AUTO: 3.01 X10(6)UL
RBC # BLD AUTO: 3.03 X10(6)UL
RBC # BLD AUTO: 3.33 X10(6)UL
RBC #/AREA URNS AUTO: >10 /HPF
RBC UR QL AUTO: NEGATIVE
RGTSCRN: 1
RH BLOOD TYPE: POSITIVE
RH BLOOD TYPE: POSITIVE
SODIUM BLOOD GAS: 141 MMOL/L (ref 136–144)
SODIUM BLOOD GAS: 146 MMOL/L (ref 136–144)
SODIUM BLOOD GAS: 150 MMOL/L (ref 136–144)
SODIUM BLOOD GAS: 152 MMOL/L (ref 136–144)
SODIUM SERPL-SCNC: 134 MMOL/L (ref 136–145)
SODIUM SERPL-SCNC: 136 MMOL/L (ref 136–145)
SODIUM SERPL-SCNC: 136 MMOL/L (ref 136–145)
SODIUM SERPL-SCNC: 137 MMOL/L (ref 136–145)
SODIUM SERPL-SCNC: 137 MMOL/L (ref 136–145)
SODIUM SERPL-SCNC: 139 MMOL/L (ref 136–145)
SODIUM SERPL-SCNC: 141 MMOL/L (ref 136–145)
SODIUM SERPL-SCNC: 142 MMOL/L (ref 136–145)
SODIUM SERPL-SCNC: 145 MMOL/L (ref 136–145)
SODIUM SERPL-SCNC: 148 MMOL/L (ref 136–145)
SODIUM SERPL-SCNC: 150 MMOL/L (ref 136–145)
SP GR UR STRIP.AUTO: 1.03 (ref 1–1.03)
T AXIS: 52 DEGREES
TIDAL VOLUME: 400 ML
TOTAL CELLS COUNTED: 100
TOTAL HEMOGLOBIN: 10.4 G/DL
TOTAL HEMOGLOBIN: 6.2 G/DL
TOTAL HEMOGLOBIN: 8.7 G/DL
TOTAL HEMOGLOBIN: 8.9 G/DL
TOTAL HEMOGLOBIN: 9.8 G/DL
TRIGL SERPL-MCNC: 45 MG/DL (ref 30–149)
TRIGL SERPL-MCNC: 63 MG/DL (ref 30–149)
TROPONIN I SERPL-MCNC: <0.045 NG/ML (ref ?–0.04)
UROBILINOGEN UR STRIP.AUTO-MCNC: <2 MG/DL
VANCOMYCIN SERPL-MCNC: 17.1 UG/ML
VANCOMYCIN TROUGH SERPL-MCNC: 10.8 UG/ML (ref 10–20)
VANCOMYCIN TROUGH SERPL-MCNC: 27.9 UG/ML (ref 10–20)
VENT RATE: 16 /MIN
VENTRICULAR RATE: 77 BPM
WBC # BLD AUTO: 10.2 X10(3) UL (ref 4–11)
WBC # BLD AUTO: 13.6 X10(3) UL (ref 4–11)
WBC # BLD AUTO: 3.1 X10(3) UL (ref 4–11)
WBC # BLD AUTO: 5.2 X10(3) UL (ref 4–11)
WBC # BLD AUTO: 5.5 X10(3) UL (ref 4–11)
WBC # BLD AUTO: 6.1 X10(3) UL (ref 4–11)
WBC # BLD AUTO: 6.3 X10(3) UL (ref 4–11)
WBC # BLD AUTO: 7.2 X10(3) UL (ref 4–11)
WBC # BLD AUTO: 8 X10(3) UL (ref 4–11)
WBC # BLD AUTO: 8.1 X10(3) UL (ref 4–11)
WBC # BLD AUTO: 8.6 X10(3) UL (ref 4–11)
WBC # BLD AUTO: 9.3 X10(3) UL (ref 4–11)

## 2020-01-01 PROCEDURE — 80053 COMPREHEN METABOLIC PANEL: CPT | Performed by: EMERGENCY MEDICINE

## 2020-01-01 PROCEDURE — 99316 NF DSCHRG MGMT 30 MIN+: CPT | Performed by: NURSE PRACTITIONER

## 2020-01-01 PROCEDURE — 3078F DIAST BP <80 MM HG: CPT | Performed by: NURSE PRACTITIONER

## 2020-01-01 PROCEDURE — 82962 GLUCOSE BLOOD TEST: CPT

## 2020-01-01 PROCEDURE — 74174 CTA ABD&PLVS W/CONTRAST: CPT | Performed by: NURSE PRACTITIONER

## 2020-01-01 PROCEDURE — 73560 X-RAY EXAM OF KNEE 1 OR 2: CPT | Performed by: EMERGENCY MEDICINE

## 2020-01-01 PROCEDURE — 3074F SYST BP LT 130 MM HG: CPT | Performed by: NURSE PRACTITIONER

## 2020-01-01 PROCEDURE — 3075F SYST BP GE 130 - 139MM HG: CPT | Performed by: NURSE PRACTITIONER

## 2020-01-01 PROCEDURE — 99222 1ST HOSP IP/OBS MODERATE 55: CPT | Performed by: NURSE PRACTITIONER

## 2020-01-01 PROCEDURE — 97530 THERAPEUTIC ACTIVITIES: CPT

## 2020-01-01 PROCEDURE — 30243N1 TRANSFUSION OF NONAUTOLOGOUS RED BLOOD CELLS INTO CENTRAL VEIN, PERCUTANEOUS APPROACH: ICD-10-PCS | Performed by: INTERNAL MEDICINE

## 2020-01-01 PROCEDURE — 85025 COMPLETE CBC W/AUTO DIFF WBC: CPT | Performed by: EMERGENCY MEDICINE

## 2020-01-01 PROCEDURE — 99233 SBSQ HOSP IP/OBS HIGH 50: CPT | Performed by: CLINICAL NURSE SPECIALIST

## 2020-01-01 PROCEDURE — 73502 X-RAY EXAM HIP UNI 2-3 VIEWS: CPT | Performed by: INTERNAL MEDICINE

## 2020-01-01 PROCEDURE — 99307 SBSQ NF CARE SF MDM 10: CPT | Performed by: NURSE PRACTITIONER

## 2020-01-01 PROCEDURE — 0DH67UZ INSERTION OF FEEDING DEVICE INTO STOMACH, VIA NATURAL OR ARTIFICIAL OPENING: ICD-10-PCS | Performed by: INTERNAL MEDICINE

## 2020-01-01 PROCEDURE — 83735 ASSAY OF MAGNESIUM: CPT | Performed by: HOSPITALIST

## 2020-01-01 PROCEDURE — 99309 SBSQ NF CARE MODERATE MDM 30: CPT | Performed by: NURSE PRACTITIONER

## 2020-01-01 PROCEDURE — 5A09357 ASSISTANCE WITH RESPIRATORY VENTILATION, LESS THAN 24 CONSECUTIVE HOURS, CONTINUOUS POSITIVE AIRWAY PRESSURE: ICD-10-PCS | Performed by: EMERGENCY MEDICINE

## 2020-01-01 PROCEDURE — 0BH17EZ INSERTION OF ENDOTRACHEAL AIRWAY INTO TRACHEA, VIA NATURAL OR ARTIFICIAL OPENING: ICD-10-PCS | Performed by: STUDENT IN AN ORGANIZED HEALTH CARE EDUCATION/TRAINING PROGRAM

## 2020-01-01 PROCEDURE — 71045 X-RAY EXAM CHEST 1 VIEW: CPT | Performed by: EMERGENCY MEDICINE

## 2020-01-01 PROCEDURE — 1111F DSCHRG MED/CURRENT MED MERGE: CPT | Performed by: NURSE PRACTITIONER

## 2020-01-01 PROCEDURE — 5A1955Z RESPIRATORY VENTILATION, GREATER THAN 96 CONSECUTIVE HOURS: ICD-10-PCS | Performed by: INTERNAL MEDICINE

## 2020-01-01 PROCEDURE — 3E0333Z INTRODUCTION OF ANTI-INFLAMMATORY INTO PERIPHERAL VEIN, PERCUTANEOUS APPROACH: ICD-10-PCS | Performed by: INTERNAL MEDICINE

## 2020-01-01 PROCEDURE — XW033E5 INTRODUCTION OF REMDESIVIR ANTI-INFECTIVE INTO PERIPHERAL VEIN, PERCUTANEOUS APPROACH, NEW TECHNOLOGY GROUP 5: ICD-10-PCS | Performed by: INTERNAL MEDICINE

## 2020-01-01 PROCEDURE — 71045 X-RAY EXAM CHEST 1 VIEW: CPT | Performed by: NURSE PRACTITIONER

## 2020-01-01 PROCEDURE — 0BH17EZ INSERTION OF ENDOTRACHEAL AIRWAY INTO TRACHEA, VIA NATURAL OR ARTIFICIAL OPENING: ICD-10-PCS | Performed by: EMERGENCY MEDICINE

## 2020-01-01 PROCEDURE — 99223 1ST HOSP IP/OBS HIGH 75: CPT | Performed by: SURGERY

## 2020-01-01 PROCEDURE — 99291 CRITICAL CARE FIRST HOUR: CPT | Performed by: NURSE PRACTITIONER

## 2020-01-01 PROCEDURE — 85025 COMPLETE CBC W/AUTO DIFF WBC: CPT | Performed by: HOSPITALIST

## 2020-01-01 PROCEDURE — 71275 CT ANGIOGRAPHY CHEST: CPT | Performed by: EMERGENCY MEDICINE

## 2020-01-01 PROCEDURE — 5A1945Z RESPIRATORY VENTILATION, 24-96 CONSECUTIVE HOURS: ICD-10-PCS | Performed by: INTERNAL MEDICINE

## 2020-01-01 PROCEDURE — 71045 X-RAY EXAM CHEST 1 VIEW: CPT | Performed by: INTERNAL MEDICINE

## 2020-01-01 PROCEDURE — B548ZZA ULTRASONOGRAPHY OF SUPERIOR VENA CAVA, GUIDANCE: ICD-10-PCS | Performed by: INTERNAL MEDICINE

## 2020-01-01 PROCEDURE — 99308 SBSQ NF CARE LOW MDM 20: CPT | Performed by: NURSE PRACTITIONER

## 2020-01-01 PROCEDURE — 3E0G76Z INTRODUCTION OF NUTRITIONAL SUBSTANCE INTO UPPER GI, VIA NATURAL OR ARTIFICIAL OPENING: ICD-10-PCS | Performed by: INTERNAL MEDICINE

## 2020-01-01 PROCEDURE — 97162 PT EVAL MOD COMPLEX 30 MIN: CPT

## 2020-01-01 PROCEDURE — 97165 OT EVAL LOW COMPLEX 30 MIN: CPT

## 2020-01-01 PROCEDURE — 99306 1ST NF CARE HIGH MDM 50: CPT | Performed by: NURSE PRACTITIONER

## 2020-01-01 PROCEDURE — 99285 EMERGENCY DEPT VISIT HI MDM: CPT

## 2020-01-01 PROCEDURE — 02HV33Z INSERTION OF INFUSION DEVICE INTO SUPERIOR VENA CAVA, PERCUTANEOUS APPROACH: ICD-10-PCS | Performed by: INTERNAL MEDICINE

## 2020-01-01 PROCEDURE — 93971 EXTREMITY STUDY: CPT | Performed by: INTERNAL MEDICINE

## 2020-01-01 PROCEDURE — 96374 THER/PROPH/DIAG INJ IV PUSH: CPT

## 2020-01-01 RX ORDER — VANCOMYCIN HYDROCHLORIDE
1250
Status: DISCONTINUED | OUTPATIENT
Start: 2020-01-01 | End: 2020-01-01

## 2020-01-01 RX ORDER — GARLIC EXTRACT 500 MG
1 CAPSULE ORAL 2 TIMES DAILY
COMMUNITY
Start: 2020-01-01 | End: 2020-01-01

## 2020-01-01 RX ORDER — FAMOTIDINE 10 MG/ML
20 INJECTION, SOLUTION INTRAVENOUS NIGHTLY
Status: DISCONTINUED | OUTPATIENT
Start: 2020-01-01 | End: 2020-01-01

## 2020-01-01 RX ORDER — METHYLPREDNISOLONE SODIUM SUCCINATE 125 MG/2ML
INJECTION, POWDER, LYOPHILIZED, FOR SOLUTION INTRAMUSCULAR; INTRAVENOUS
Status: DISPENSED
Start: 2020-01-01 | End: 2020-01-01

## 2020-01-01 RX ORDER — ACETAMINOPHEN 160 MG/5ML
650 SOLUTION ORAL EVERY 6 HOURS PRN
Status: DISCONTINUED | OUTPATIENT
Start: 2020-01-01 | End: 2020-01-01

## 2020-01-01 RX ORDER — ASCORBIC ACID 500 MG
500 TABLET ORAL DAILY
Status: DISCONTINUED | OUTPATIENT
Start: 2020-01-01 | End: 2021-01-01

## 2020-01-01 RX ORDER — ACETAMINOPHEN 650 MG/1
650 SUPPOSITORY RECTAL EVERY 6 HOURS PRN
Status: DISCONTINUED | OUTPATIENT
Start: 2020-01-01 | End: 2021-01-01

## 2020-01-01 RX ORDER — MORPHINE SULFATE 2 MG/ML
2 INJECTION, SOLUTION INTRAMUSCULAR; INTRAVENOUS ONCE
Status: COMPLETED | OUTPATIENT
Start: 2020-01-01 | End: 2020-01-01

## 2020-01-01 RX ORDER — BISACODYL 10 MG
10 SUPPOSITORY, RECTAL RECTAL
COMMUNITY

## 2020-01-01 RX ORDER — HYDRALAZINE HYDROCHLORIDE 20 MG/ML
10 INJECTION INTRAMUSCULAR; INTRAVENOUS EVERY 4 HOURS PRN
Status: DISCONTINUED | OUTPATIENT
Start: 2020-01-01 | End: 2021-01-01

## 2020-01-01 RX ORDER — FAMOTIDINE 20 MG/1
20 TABLET ORAL NIGHTLY
Status: DISCONTINUED | OUTPATIENT
Start: 2020-01-01 | End: 2020-01-01

## 2020-01-01 RX ORDER — ATORVASTATIN CALCIUM 20 MG/1
20 TABLET, FILM COATED ORAL NIGHTLY
Refills: 3 | Status: DISCONTINUED | OUTPATIENT
Start: 2020-01-01 | End: 2021-01-01

## 2020-01-01 RX ORDER — ATORVASTATIN CALCIUM 20 MG/1
20 TABLET, FILM COATED ORAL NIGHTLY
Status: DISCONTINUED | OUTPATIENT
Start: 2020-01-01 | End: 2020-01-01

## 2020-01-01 RX ORDER — ACETAMINOPHEN 325 MG/1
650 TABLET ORAL EVERY 6 HOURS PRN
COMMUNITY
Start: 2020-01-01

## 2020-01-01 RX ORDER — GABAPENTIN 100 MG/1
100 CAPSULE ORAL 2 TIMES DAILY
Status: DISCONTINUED | OUTPATIENT
Start: 2020-01-01 | End: 2020-01-01

## 2020-01-01 RX ORDER — ACETAMINOPHEN 160 MG
2000 TABLET,DISINTEGRATING ORAL DAILY
COMMUNITY

## 2020-01-01 RX ORDER — BISACODYL 10 MG
10 SUPPOSITORY, RECTAL RECTAL
Status: DISCONTINUED | OUTPATIENT
Start: 2020-01-01 | End: 2020-01-01

## 2020-01-01 RX ORDER — SODIUM CHLORIDE 9 MG/ML
INJECTION, SOLUTION INTRAVENOUS CONTINUOUS
Status: DISCONTINUED | OUTPATIENT
Start: 2020-01-01 | End: 2020-01-01

## 2020-01-01 RX ORDER — GABAPENTIN 250 MG/5ML
100 SOLUTION ORAL 2 TIMES DAILY
Status: DISCONTINUED | OUTPATIENT
Start: 2020-01-01 | End: 2021-01-01

## 2020-01-01 RX ORDER — POLYETHYLENE GLYCOL 3350 17 G/17G
17 POWDER, FOR SOLUTION ORAL DAILY PRN
Status: DISCONTINUED | OUTPATIENT
Start: 2020-01-01 | End: 2021-01-01

## 2020-01-01 RX ORDER — ONDANSETRON 2 MG/ML
4 INJECTION INTRAMUSCULAR; INTRAVENOUS EVERY 6 HOURS PRN
Status: DISCONTINUED | OUTPATIENT
Start: 2020-01-01 | End: 2021-01-01

## 2020-01-01 RX ORDER — LIDOCAINE HYDROCHLORIDE 10 MG/ML
5 INJECTION, SOLUTION EPIDURAL; INFILTRATION; INTRACAUDAL; PERINEURAL ONCE
Status: DISCONTINUED | OUTPATIENT
Start: 2020-01-01 | End: 2020-01-01

## 2020-01-01 RX ORDER — BISACODYL 10 MG
10 SUPPOSITORY, RECTAL RECTAL
Status: DISCONTINUED | OUTPATIENT
Start: 2020-01-01 | End: 2021-01-01

## 2020-01-01 RX ORDER — MAGNESIUM SULFATE HEPTAHYDRATE 40 MG/ML
2 INJECTION, SOLUTION INTRAVENOUS ONCE
Status: COMPLETED | OUTPATIENT
Start: 2020-01-01 | End: 2020-01-01

## 2020-01-01 RX ORDER — LEVOTHYROXINE SODIUM 0.07 MG/1
75 TABLET ORAL
Status: DISCONTINUED | OUTPATIENT
Start: 2020-01-01 | End: 2020-01-01

## 2020-01-01 RX ORDER — POLYETHYLENE GLYCOL 3350 17 G/17G
17 POWDER, FOR SOLUTION ORAL DAILY PRN
Status: DISCONTINUED | OUTPATIENT
Start: 2020-01-01 | End: 2020-01-01

## 2020-01-01 RX ORDER — ETOMIDATE 2 MG/ML
INJECTION INTRAVENOUS
Status: COMPLETED
Start: 2020-01-01 | End: 2020-01-01

## 2020-01-01 RX ORDER — VANCOMYCIN HYDROCHLORIDE
1250 EVERY 12 HOURS SCHEDULED
Status: DISCONTINUED | OUTPATIENT
Start: 2020-01-01 | End: 2020-01-01 | Stop reason: DRUGHIGH

## 2020-01-01 RX ORDER — DONEPEZIL HYDROCHLORIDE 10 MG/1
10 TABLET, FILM COATED ORAL NIGHTLY
Status: DISCONTINUED | OUTPATIENT
Start: 2020-01-01 | End: 2020-01-01

## 2020-01-01 RX ORDER — SODIUM CHLORIDE 9 MG/ML
125 INJECTION, SOLUTION INTRAVENOUS CONTINUOUS
Status: DISCONTINUED | OUTPATIENT
Start: 2020-01-01 | End: 2020-01-01

## 2020-01-01 RX ORDER — CIPROFLOXACIN 500 MG/1
500 TABLET, FILM COATED ORAL 2 TIMES DAILY
COMMUNITY
Start: 2020-01-01 | End: 2020-01-01

## 2020-01-01 RX ORDER — CHLORHEXIDINE GLUCONATE 0.12 MG/ML
15 RINSE ORAL
Status: DISCONTINUED | OUTPATIENT
Start: 2020-01-01 | End: 2021-01-01

## 2020-01-01 RX ORDER — BACLOFEN 10 MG/1
20 TABLET ORAL 3 TIMES DAILY
Status: DISCONTINUED | OUTPATIENT
Start: 2020-01-01 | End: 2020-01-01

## 2020-01-01 RX ORDER — DEXTROSE AND SODIUM CHLORIDE 5; .45 G/100ML; G/100ML
INJECTION, SOLUTION INTRAVENOUS CONTINUOUS
Status: DISCONTINUED | OUTPATIENT
Start: 2020-01-01 | End: 2020-01-01

## 2020-01-01 RX ORDER — SODIUM CHLORIDE 9 MG/ML
500 INJECTION, SOLUTION INTRAVENOUS ONCE
Status: COMPLETED | OUTPATIENT
Start: 2020-01-01 | End: 2020-01-01

## 2020-01-01 RX ORDER — HYDROCODONE BITARTRATE AND ACETAMINOPHEN 5; 325 MG/1; MG/1
1 TABLET ORAL EVERY 6 HOURS PRN
Status: DISCONTINUED | OUTPATIENT
Start: 2020-01-01 | End: 2020-01-01

## 2020-01-01 RX ORDER — LEVOTHYROXINE SODIUM 0.07 MG/1
75 TABLET ORAL
Status: DISCONTINUED | OUTPATIENT
Start: 2020-01-01 | End: 2021-01-01

## 2020-01-01 RX ORDER — LIDOCAINE 50 MG/G
1 PATCH TOPICAL EVERY 24 HOURS
COMMUNITY
Start: 2020-01-01

## 2020-01-01 RX ORDER — VANCOMYCIN HYDROCHLORIDE
25 ONCE
Status: COMPLETED | OUTPATIENT
Start: 2020-01-01 | End: 2020-01-01

## 2020-01-01 RX ORDER — DONEPEZIL HYDROCHLORIDE 10 MG/1
10 TABLET, FILM COATED ORAL NIGHTLY
Status: DISCONTINUED | OUTPATIENT
Start: 2020-01-01 | End: 2021-01-01

## 2020-01-01 RX ORDER — ENOXAPARIN SODIUM 100 MG/ML
0.6 INJECTION SUBCUTANEOUS EVERY 12 HOURS SCHEDULED
Status: DISCONTINUED | OUTPATIENT
Start: 2020-01-01 | End: 2021-01-01

## 2020-01-01 RX ORDER — DEXAMETHASONE SODIUM PHOSPHATE 10 MG/ML
6 INJECTION, SOLUTION INTRAMUSCULAR; INTRAVENOUS ONCE
Status: COMPLETED | OUTPATIENT
Start: 2020-01-01 | End: 2020-01-01

## 2020-01-01 RX ORDER — ACETAMINOPHEN 325 MG/1
650 TABLET ORAL EVERY 6 HOURS PRN
Status: DISCONTINUED | OUTPATIENT
Start: 2020-01-01 | End: 2020-01-01

## 2020-01-01 RX ORDER — ENOXAPARIN SODIUM 100 MG/ML
40 INJECTION SUBCUTANEOUS DAILY
Status: DISCONTINUED | OUTPATIENT
Start: 2020-01-01 | End: 2020-01-01

## 2020-01-01 RX ORDER — POTASSIUM CHLORIDE 14.9 MG/ML
20 INJECTION INTRAVENOUS ONCE
Status: COMPLETED | OUTPATIENT
Start: 2020-01-01 | End: 2020-01-01

## 2020-01-01 RX ORDER — ZINC SULFATE 50(220)MG
220 CAPSULE ORAL DAILY
Status: DISCONTINUED | OUTPATIENT
Start: 2020-01-01 | End: 2021-01-01

## 2020-01-01 RX ORDER — POLYETHYLENE GLYCOL 3350 17 G/17G
17 POWDER, FOR SOLUTION ORAL DAILY
Status: DISCONTINUED | OUTPATIENT
Start: 2020-01-01 | End: 2020-01-01

## 2020-01-01 RX ORDER — POTASSIUM CHLORIDE 1.5 G/1.77G
40 POWDER, FOR SOLUTION ORAL EVERY 4 HOURS
Status: COMPLETED | OUTPATIENT
Start: 2020-01-01 | End: 2020-01-01

## 2020-01-01 RX ORDER — KETAMINE HYDROCHLORIDE 50 MG/ML
100 INJECTION, SOLUTION, CONCENTRATE INTRAMUSCULAR; INTRAVENOUS ONCE
Status: COMPLETED | OUTPATIENT
Start: 2020-01-01 | End: 2020-01-01

## 2020-01-01 RX ORDER — POTASSIUM CHLORIDE 29.8 MG/ML
40 INJECTION INTRAVENOUS ONCE
Status: COMPLETED | OUTPATIENT
Start: 2020-01-01 | End: 2020-01-01

## 2020-01-01 RX ORDER — ALBUTEROL SULFATE 90 UG/1
2 AEROSOL, METERED RESPIRATORY (INHALATION) EVERY 6 HOURS PRN
COMMUNITY
End: 2020-01-01

## 2020-01-01 RX ORDER — DEXAMETHASONE SODIUM PHOSPHATE 4 MG/ML
6 VIAL (ML) INJECTION DAILY
Status: DISCONTINUED | OUTPATIENT
Start: 2020-01-01 | End: 2020-01-01

## 2020-01-01 RX ORDER — CHLORHEXIDINE GLUCONATE 0.12 MG/ML
15 RINSE ORAL
Status: DISCONTINUED | OUTPATIENT
Start: 2020-01-01 | End: 2020-01-01

## 2020-01-01 RX ORDER — HYDROCODONE BITARTRATE AND ACETAMINOPHEN 5; 325 MG/1; MG/1
1 TABLET ORAL EVERY 6 HOURS PRN
Qty: 10 TABLET | Refills: 0 | Status: SHIPPED | OUTPATIENT
Start: 2020-01-01

## 2020-01-01 RX ORDER — DEXMEDETOMIDINE HYDROCHLORIDE 4 UG/ML
INJECTION, SOLUTION INTRAVENOUS
Status: COMPLETED
Start: 2020-01-01 | End: 2020-01-01

## 2020-01-01 RX ORDER — DEXTROSE MONOHYDRATE 25 G/50ML
50 INJECTION, SOLUTION INTRAVENOUS
Status: DISCONTINUED | OUTPATIENT
Start: 2020-01-01 | End: 2020-01-01

## 2020-01-01 RX ORDER — ACETAMINOPHEN 160 MG
2000 TABLET,DISINTEGRATING ORAL DAILY
Status: DISCONTINUED | OUTPATIENT
Start: 2020-01-01 | End: 2020-01-01

## 2020-01-01 RX ORDER — B-COMPLEX WITH VITAMIN C
1 TABLET ORAL 2 TIMES DAILY
COMMUNITY
Start: 2020-01-01

## 2020-01-01 RX ORDER — DEXTROSE MONOHYDRATE 25 G/50ML
50 INJECTION, SOLUTION INTRAVENOUS
Status: DISCONTINUED | OUTPATIENT
Start: 2020-01-01 | End: 2021-01-01

## 2020-01-01 RX ORDER — ACETAMINOPHEN 160 MG/5ML
650 SOLUTION ORAL EVERY 6 HOURS PRN
Status: DISCONTINUED | OUTPATIENT
Start: 2020-01-01 | End: 2021-01-01

## 2020-01-01 RX ORDER — ASCORBIC ACID 500 MG
500 TABLET ORAL DAILY
COMMUNITY
Start: 2020-01-01

## 2020-01-01 RX ORDER — DOCUSATE SODIUM 100 MG/1
100 CAPSULE, LIQUID FILLED ORAL 2 TIMES DAILY
COMMUNITY
Start: 2020-01-01

## 2020-01-01 RX ORDER — DOCUSATE SODIUM 100 MG/1
100 CAPSULE, LIQUID FILLED ORAL 2 TIMES DAILY
Status: DISCONTINUED | OUTPATIENT
Start: 2020-01-01 | End: 2020-01-01

## 2020-01-01 RX ORDER — FAMOTIDINE 20 MG/1
20 TABLET ORAL 2 TIMES DAILY
Status: DISCONTINUED | OUTPATIENT
Start: 2020-01-01 | End: 2021-01-01

## 2020-01-01 RX ORDER — ACETAMINOPHEN 650 MG/1
650 SUPPOSITORY RECTAL EVERY 6 HOURS PRN
Status: DISCONTINUED | OUTPATIENT
Start: 2020-01-01 | End: 2020-01-01

## 2020-01-01 RX ORDER — SODIUM PHOSPHATE, DIBASIC AND SODIUM PHOSPHATE, MONOBASIC 7; 19 G/133ML; G/133ML
1 ENEMA RECTAL ONCE AS NEEDED
Status: DISCONTINUED | OUTPATIENT
Start: 2020-01-01 | End: 2021-01-01

## 2020-01-01 RX ORDER — TIZANIDINE 2 MG/1
2 TABLET ORAL 2 TIMES DAILY
Status: DISCONTINUED | OUTPATIENT
Start: 2020-01-01 | End: 2020-01-01

## 2020-01-01 RX ORDER — MORPHINE SULFATE 2 MG/ML
2 INJECTION, SOLUTION INTRAMUSCULAR; INTRAVENOUS EVERY 4 HOURS PRN
Status: DISCONTINUED | OUTPATIENT
Start: 2020-01-01 | End: 2020-01-01

## 2020-01-01 RX ORDER — LISINOPRIL 2.5 MG/1
2.5 TABLET ORAL DAILY
Status: DISCONTINUED | OUTPATIENT
Start: 2020-01-01 | End: 2020-01-01

## 2020-01-01 RX ORDER — GABAPENTIN 250 MG/5ML
100 SOLUTION ORAL 2 TIMES DAILY
Status: DISCONTINUED | OUTPATIENT
Start: 2020-01-01 | End: 2020-01-01

## 2020-01-01 RX ORDER — SODIUM CHLORIDE 9 MG/ML
INJECTION, SOLUTION INTRAVENOUS ONCE
Status: COMPLETED | OUTPATIENT
Start: 2020-01-01 | End: 2020-01-01

## 2020-01-01 RX ORDER — DEXAMETHASONE SODIUM PHOSPHATE 4 MG/ML
6 VIAL (ML) INJECTION DAILY
Status: COMPLETED | OUTPATIENT
Start: 2020-01-01 | End: 2020-01-01

## 2020-01-01 RX ORDER — SODIUM PHOSPHATE, DIBASIC AND SODIUM PHOSPHATE, MONOBASIC 7; 19 G/133ML; G/133ML
1 ENEMA RECTAL ONCE AS NEEDED
Status: DISCONTINUED | OUTPATIENT
Start: 2020-01-01 | End: 2020-01-01

## 2020-01-01 RX ORDER — DEXMEDETOMIDINE HYDROCHLORIDE 4 UG/ML
INJECTION, SOLUTION INTRAVENOUS CONTINUOUS
Status: DISCONTINUED | OUTPATIENT
Start: 2020-01-01 | End: 2020-01-01

## 2020-01-01 RX ORDER — KETAMINE HYDROCHLORIDE 50 MG/ML
INJECTION, SOLUTION, CONCENTRATE INTRAMUSCULAR; INTRAVENOUS
Status: COMPLETED | OUTPATIENT
Start: 2020-01-01 | End: 2020-01-01

## 2020-01-01 RX ORDER — ONDANSETRON 2 MG/ML
INJECTION INTRAMUSCULAR; INTRAVENOUS
Status: COMPLETED
Start: 2020-01-01 | End: 2020-01-01

## 2020-01-01 RX ORDER — CALCIUM CARBONATE/VITAMIN D3 250-3.125
2 TABLET ORAL 2 TIMES DAILY
Status: DISCONTINUED | OUTPATIENT
Start: 2020-01-01 | End: 2021-01-01

## 2020-01-01 RX ORDER — FAMOTIDINE 10 MG/ML
20 INJECTION, SOLUTION INTRAVENOUS 2 TIMES DAILY
Status: DISCONTINUED | OUTPATIENT
Start: 2020-01-01 | End: 2021-01-01

## 2020-01-01 RX ORDER — ACETAMINOPHEN 325 MG/1
650 TABLET ORAL EVERY 6 HOURS PRN
Status: DISCONTINUED | OUTPATIENT
Start: 2020-01-01 | End: 2021-01-01

## 2020-01-01 RX ORDER — DEXTROSE MONOHYDRATE 25 G/50ML
INJECTION, SOLUTION INTRAVENOUS
Status: COMPLETED
Start: 2020-01-01 | End: 2020-01-01

## 2020-01-01 RX ORDER — DEXMEDETOMIDINE HYDROCHLORIDE 4 UG/ML
INJECTION, SOLUTION INTRAVENOUS CONTINUOUS
Status: DISCONTINUED | OUTPATIENT
Start: 2020-01-01 | End: 2021-01-01 | Stop reason: ALTCHOICE

## 2020-01-01 RX ORDER — ZINC SULFATE 50(220)MG
50 CAPSULE ORAL DAILY
COMMUNITY
Start: 2020-01-01

## 2020-07-28 PROBLEM — S72.21XA CLOSED SUBTROCHANTERIC FRACTURE OF RIGHT FEMUR (HCC): Status: RESOLVED | Noted: 2017-12-08 | Resolved: 2020-01-01

## 2020-07-29 PROBLEM — L89.309 PRESSURE INJURY OF SKIN OF BUTTOCK: Status: ACTIVE | Noted: 2020-01-01

## 2020-09-22 PROBLEM — F02.80 DEMENTIA ASSOCIATED WITH OTHER UNDERLYING DISEASE WITHOUT BEHAVIORAL DISTURBANCE (HCC): Status: ACTIVE | Noted: 2020-01-01

## 2020-10-08 PROBLEM — R52 PAIN OF RIGHT SIDE OF BODY: Status: ACTIVE | Noted: 2020-01-01

## 2020-10-08 NOTE — PLAN OF CARE
NURSING ADMISSION NOTE      Patient admitted via cart. Oriented to room. Safety precautions initiated. Bed in low position. Call light in reach.     Admitted patient from ED due to Right leg pain, X-ray done prior transfer to floor - no fracture on

## 2020-10-08 NOTE — CM/SW NOTE
10/08/20 1300   CM/SW Referral Data   Referral Source Social Work (self-referral)   Reason for Referral Discharge planning   Informant Patient   Patient Info   Patient's Mental Status Confused;Memory Impairments   Patient's [address]   [place]

## 2020-10-08 NOTE — CM/SW NOTE
SW had a very long conversation w/pt and son. They do not want any of the [facility]'s on the [facility] list. They want MBM-N. Will see if we can get auth from [facility]. Worst case scenario they will accept [facility], but do not actually want that facility.

## 2020-10-08 NOTE — PLAN OF CARE
Problem: Impaired Activities of Daily Living  Goal: Achieve highest/safest level of independence in self care  Description: Interventions:  - Assess ability and encourage patient to participate in ADLs to maximize function  - Promote sitting position [facility]

## 2020-10-08 NOTE — ED NOTES
Per pts  they have been looking into having pt in a nursing home. Per  he is the primary care taker, states at times a visiting nurse will come.

## 2020-10-08 NOTE — ED INITIAL ASSESSMENT (HPI)
Pt states she has been having progressive pain to her right leg. States she cannot recall when she last fell but that she thinks she may have hurt her right leg with her last fall. Denies falling tonight.  Pt has chronic pain to her right side, states that

## 2020-10-08 NOTE — ED PROVIDER NOTES
Patient Seen in: [facility] Emergency Department      History   Patient presents with:  Leg or Foot Injury    Stated Complaint: right leg pain    HPI    Germán Alcantara is a pleasant 61-year-old female coming with complaints of right leg pain.   She has been Former Smoker        Packs/day: 1.00        Years: 10.00        Pack years: 10        Quit date: 10/8/1976        Years since quittin.0      Smokeless tobacco: Never Used    Alcohol use:  Yes      Alcohol/week: 0.0 - 1.0 standard drinks      Comment: Z PLATELET.   Procedure                               Abnormality         Status                     ---------                               -----------         ------                     CBC W/ DIFFERENTIAL[[id number]]          Abnormal            Final resul

## 2020-10-08 NOTE — PLAN OF CARE
Pt Aox3. A little delayed in responses. Pleasant.  at bedside this AM. Worked with PT. Recommending [facility]. Right leg painful, non-specific to where. Monitoring blood glucose qid. 1400--ortho consulted, xray right hip ordered.  Pain improved with norc

## 2020-10-08 NOTE — OCCUPATIONAL THERAPY NOTE
OCCUPATIONAL THERAPY EVALUATION - INPATIENT     Room Number: 403/403-A  Evaluation Date: 10/8/2020  Type of Evaluation: Initial  Presenting Problem: RLE pain    Physician Order: IP Consult to Occupational Therapy  Reason for Therapy: ADL/IADL Dysfunction a lift)  Lives With: Spouse    Toilet and Equipment: Comfort height toilet;Grab bar             Hand Dominance: Right  Drives: No       Prior Level of Function: Pt reports she is able to wash her face, comb her hair, and feed herself.  Spouse assists with zacarias person does the patient currently need…  -   Putting on and taking off regular lower body clothing?: Total  -   Bathing (including washing, rinsing, drying)?: Total  -   Toileting, which includes using toilet, bedpan or urinal? : Total  -   Putting on and evaluation patient presents with the following performance deficits: balance, midline positioning, LUE tone, pain management, strength, endurance, activity tolerance, posture.  These deficits impact the patient’s ability to participate in ADL, transfers, in

## 2020-10-08 NOTE — ED NOTES
Pt has a chair lift at home, pt is not ambulatory, went to podiatrist today and when transferring from chair to wheelchair pt began to have complaints of pain. Per  over the day pt had began to have complaints of increasing pain to her right leg.  Pe

## 2020-10-08 NOTE — PHYSICAL THERAPY NOTE
PHYSICAL THERAPY EVALUATION - INPATIENT     Room Number: 403/403-A  Evaluation Date: 10/8/2020  Type of Evaluation: Initial  Physician Order: PT Eval and Treat    Presenting Problem: R LE pain  Reason for Therapy: Mobility Dysfunction and Discharge P Lives With: Spouse  Drives: No  Patient Owned Equipment: (W/C, scooter, commode)       Prior Level of [place]: Pt lives with spouse in 2 story home. Pts spouse is full time caregiver to patient. Pt has assistance for all transfers and ADL.  P bed?: Unable   How much help from another person does the patient currently need. ..   -   Moving to and from a bed to a chair (including a wheelchair)?: Total   -   Need to walk in hospital room?: Total   -   Climbing 3-5 steps with a railing?: Total evaluation, the patient presents with the following impairments R LE pain, strength deficits, balance impairments, poor trunk control, and decreased activity tolerance. Functional outcome measures completed include AMPAC.  Based on this evaluation, patient'

## 2020-10-08 NOTE — H&P
[facility] Hospitalist Team  History and Physical       Assessment/Plan:     # R knee pain - suspect injury  -xr personally reviewed, ?  Screws appear more laterally - will have ortho review  -hip xr pending  -PT josephine, recommending [facility]  -SW consulted    #DM1  -rev Performed by Nirmal Austin MD at [facility]   • FEMUR IM NAIL RETROGRADE Left 8/18/2013    Performed by Mihir Pires MD at [facility] MAIN OR   • FRACTURE SURGERY Bilateral     FEMURS   • HIP REPLACEMENT SURGERY     • OTHER Right shoulder replacement   • OTHE Temp 99.2 °F (37.3 °C) (Oral)   Resp 18   Ht 5' 8\" (1.727 m)   Wt 135 lb (61.2 kg)   SpO2 94%   BMI 20.53 kg/m²   General:  Alert, no distress, appears stated age. Head:  Normocephalic, without obvious abnormality, atraumatic.    Eyes:  Sclera anicteri structures. Postsurgical changes include an intramedullary parveen throughout the femur with a threaded intratrochanteric screw proximally and 2 threaded screws distally.   To distal screws are not fully seated within the femur and appear more laterally placed

## 2020-10-09 NOTE — CM/SW NOTE
Updated son in regards to previous CM note. Pt will remain at hospital through the weekend due to the pt not having insurance auth.

## 2020-10-09 NOTE — PROGRESS NOTES
[facility] Hospitalist Team  Progress Note      Abimael Vickers  3/24/1945    Assessment/Plan:       # R knee pain - suspect injury  -knee/hip xr no fx  -ortho saw pt, no interventions planned, wbat  -PT eval, recommending J[facility]SE  -MARITZA consulted    #DM1  -reviewed e MARVIN AC   • lisinopril  2.5 mg Oral Daily   • atorvastatin  20 mg Oral Nightly   • enoxaparin  40 mg Subcutaneous Daily   • Insulin Aspart Pen  1-5 Units Subcutaneous TID CC and HS   • insulin detemir  10 Units Subcutaneous Daily   • Insulin Aspart Pen  1-6

## 2020-10-09 NOTE — PLAN OF CARE
1915 received patient in handoff.  and son at bedside. Patient appears in good spirits. Denies pain at this time. IV is sunil locked. Able to take pills whole with sips of water. SCD's are on.

## 2020-10-09 NOTE — CM/SW NOTE
MARITZA called Clemencia Juarez from [facility] regarding patient . Attempted to get auth for MBM-N per family request. If pt is not able to go to [facility]. They would like Jong. DON called. Referrals sent via Aidin.

## 2020-10-09 NOTE — CM/SW NOTE
RADHA WHITTAKER for Latricia Cerna at [facility] # [phone] in an attempt to secure insurance auth for JUAN JOSE. Will continue to follow up.    15:00  2nd message sent to Latricia Cerna with no response.  Spoke with Janice Gomez [phone] who will send a message to Latricia Cerna to inform her to ca

## 2020-10-09 NOTE — PLAN OF CARE
Awake & alert,able to make needs known. Patient noted w/ some confusion & forgetfulness. Baclofen given as scheduled for muscle spasm. Dose of norco given after applying mepilex foam to excoriated buttocks & perineal care for leg pain.  Patient with very good patient/family  Outcome: Not Progressing     Problem: Impaired Functional Mobility  Goal: Achieve highest/safest level of mobility/gait  Description: Interventions:  - Assess patient's functional ability and stability  - Promote increasing activity/toleran increased pain with activity and pre-medicate as appropriate  Outcome: Progressing     Problem: DISCHARGE PLANNING  Goal: Discharge to home or other facility with appropriate resources  Description: INTERVENTIONS:  - Identify barriers to discharge w/pt and

## 2020-10-09 NOTE — CONSULTS
[facility]    Report of Consultation    N[place] Patient Status:  Observation    3/24/1945 MRN [id number]   [facility] 4NW-A Attending Cory Wilcox DO   Hosp Day # 0 PCP Shirl Collet, MD     Date of Admission:  10/8/2020 • FEMUR FRACTURE SURGERY  12/2017    right   • FEMUR IM NAIL Right 12/2/2017    Performed by Dorothy Wyman MD at [address] [place] [address]   • FEMUR IM NAIL RETROGRADE Left 8/18/2013    Performed by Tona Chan MD at [facility] MAIN OR   • FRACTURE SURGERY Bilateral Subcutaneous, TID CC and HS    •  glucose (DEX4) oral liquid 15 g, 15 g, Oral, Q15 Min PRN    Or    •  Glucose-Vitamin C (DEX-4) chewable tab 4 tablet, 4 tablet, Oral, Q15 Min PRN    Or    •  dextrose 50 % injection 50 mL, 50 mL, Intravenous, Q15 Min PRN Misc, Use with insulin 4 times per day    •  Insulin Degludec (TRESIBA FLEXTOUCH) 100 UNIT/ML Subcutaneous Solution Pen-injector, Inject 10 Units into the skin every morning.  (Patient taking differently: Inject 10 Units into the skin nightly.  )    •  CONT temperature 98.6 °F (37 °C), temperature source Oral, resp. rate 18, height 5' 8\" (1.727 m), weight 135 lb (61.2 kg), SpO2 94 %, not currently breastfeeding. Constitutional: No acute distress. Head/Face: Normal appearance. Normocephalic. Atraumatic. Imaging:    X-rays the right hip demonstrate no acute fractures, subluxations, dislocations. There is a long cephalo-medullary nail for a previous intertrochanteric femur fracture that is now healed.   No evidence of hardware loosening in the hip or pr

## 2020-10-10 NOTE — PLAN OF CARE
A&Ox4, VSS, afebrile. Hx MS- wheelchair bound at home. Bedrest and turn q2hr while in hospital. Patient can assist with turning and repositioning. Left arm contracture. Able to feed herself with right hand.    Patient reporting constipation- received order patient/family  Outcome: Progressing     Problem: PAIN - ADULT  Goal: Verbalizes/displays adequate comfort level or patient's stated pain goal  Description: INTERVENTIONS:  - Encourage pt to monitor pain and request assistance  - Assess pain using appropri Complete POLST form as appropriate  - Assess patient's ability to be responsible for managing their own health  - Refer to Case Management Department for coordinating discharge planning if the patient needs post-hospital services based on physician/LIP ord

## 2020-10-10 NOTE — PLAN OF CARE
1930 received patient in handoff.  at bedside. She offers no c/o pain HS meds given. Rechecked BS at 2200 and it was 104. ( Per Day RN she discussed Insulin coverage with MD at 1900. She stated MD made aware she had already covered  With 12 units of i

## 2020-10-10 NOTE — PROGRESS NOTES
[facility] Hospitalist Team  Progress Note      Abimael Vickers  3/24/1945    Assessment/Plan:       # R knee pain - suspect injury  -knee/hip xr no fx  -ortho saw pt, no interventions planned, wbat  -PT eval, recommending JUAN JOSE  -MARITZA consulted    #DM1  -reviewed e Pen  12 Units Subcutaneous Before dinner   • Insulin Aspart Pen  2-10 Units Subcutaneous TID CC and HS   • baclofen  20 mg Oral TID   • Donepezil HCl  10 mg Oral Nightly   • gabapentin  100 mg Oral BID   • Levothyroxine Sodium  75 mcg Oral QAM AC   • lisin

## 2020-10-11 NOTE — PLAN OF CARE
Patient a/o x4. Adilia on room air. C/o left leg pain. Prn tylenol/ ice packs given. Repositioned in bed. No acute event overnight. Bed alarm on.    Problem: SKIN/TISSUE INTEGRITY - ADULT  Goal: Skin integrity remains intact  Description: INTERVENTIONS  -

## 2020-10-12 NOTE — PLAN OF CARE
1930 received patient in handoff.  at bedside. Patient is forgetful about day, month and her age. C/O pain in r leg with turning. Had Tylenol at change of shift. Purewick in place. No BM thus far this shift. Awaiting insurance auth.  For JUAN JOSE on disch

## 2020-10-12 NOTE — CM/SW NOTE
CM received a call from Maryann at [facility] who states pt has been approved for J[facility]. Insurance Kelsea [facility] #[id number]. Sent message to [facility] via Aidin to inform them of above and possible discharge today pending endo consult.     Marsha Daly MSN RN, CTL  P: [phone]

## 2020-10-12 NOTE — CONSULTS
[facility]  Endocrinology Consultation    [facility] Patient Status:  Observation    3/24/1945 MRN [id number]   [facility] 4NW-A Attending Tate Mccormick DO   Hosp Day # 0 PCP Catherine Melgoza MD     Reason for Consultation:  DM2 middle and ring finger       Family History   Problem Relation Age of Onset   • Stroke Father        Social Hx   reports that she quit smoking about 44 years ago. She has a 10.00 pack-year smoking history.  She has never used smokeless tobacco. She reports Daily    Allergies:    Oxybutynin                  Comment:Dry mouth,and trouble with bowel movements    ROS  GEN: no new fatigue, no new weight changes  HEENT: no new headaches, no new visual changes  Neck: no new ant swelling, no new masses  CV: no new c DM1  - Last A1c value was 6.9% done 9/23/2020 however uncontrolled with hyperglycemia during hospital course  - complicated by HTN, HLD, diabetic polyneuropathy  - levemir 10 daily  - novolog 1 unit for every 10g carbs plus correction 1:10>140 QID  - will

## 2020-10-12 NOTE — PLAN OF CARE
Pt AOx3-4. VSS. No complaints of pain. BG this . 30u novolog and 10u Levemir given. MD paged. BG recheck 470. Orders for 500ml bolus of 0.9 NS and 5u levemir.  upon recheck. MD aware. Insurance authorization cleared for DC to [facility].  Possible disc

## 2020-10-12 NOTE — CM/SW NOTE
10/12/20 1500   Discharge disposition   Expected discharge disposition Skilled Nurs   Name of Facillity/Home Care/Hospice [address]   Discharge transportation [facility] [employer] BLS to arrive at 630pm. RN aware and will inform family  Hayden Minors

## 2020-10-12 NOTE — DISCHARGE SUMMARY
General Medicine Discharge Summary     Patient ID:  Jolene Hull  77year old  3/24/1945    Admit date: 10/8/2020    Discharge date and time: 10/12/20    Attending Physician: SILVINA Mendoza Solution Pen-injector  Take 10-15 units with each meal plus scale    HYDROcodone-acetaminophen 5-325 MG Oral Tab  Take 1 tablet by mouth every 6 (six) hours as needed for Pain.     tiZANidine HCl 2 MG Oral Tab  Take 1 tablet (2 mg total) by mouth 2 (two) ti documentation - see Epic Immunization Activity for administration details    denosumab 60 MG/ML Subcutaneous Solution  Inject 1 mL (60 mg total) into the skin every 6 (six) months.     insulin glargine 100 UNIT/ML Subcutaneous Solution  Inject 12 Units into

## 2020-10-13 NOTE — PLAN OF CARE
NURSING DISCHARGE NOTE    Discharged Rehab facility via Ambulance. Accompanied by Support staff  Belongings Taken by patient/family. Discharge instructions and prescriptions sent to [facility] with pt.

## 2020-10-15 NOTE — PROGRESS NOTES
Nata [place]  : 3/24/1945  Age 77year old  female patient is admitted to Facility: [facility] and  [address] hospital Admit date:  10/8/20  Discharge date to [facility]:  10/12/20  LILIAN:  GARRETT  Anti asp. PNA. Son and  verbalized understanding. Also asked if patient had dentures b/c she has upper teeth missing, which can hinder chewing and swallowing. The  stated, \"yes. \"  He will bring in her teeth on next visit.   Discussed the possi Alcohol use:  Yes      Alcohol/week: 0.0 - 1.0 standard drinks      Comment: ZINFANDEL GLASS OF WINE OCCASSIONALLY     Drug use: No      ALLERGIES:    Oxybutynin                  Comment:Dry mouth,and trouble with bowel movements    CODE STATUS:  Full Code 45 mL 0   • denosumab 60 MG/ML Subcutaneous Solution Inject 1 mL (60 mg total) into the skin every 6 (six) months. 1 Syringe 0   • ibuprofen 200 MG Oral Tab Take 200 mg by mouth every 6 (six) hours as needed for Pain.      • Omega-3 Fatty Acids (FISH OIL) 5 hand; w/c bound  PSYCHE: no symptoms of depression or anxiety  HEMATOLOGY:denies hx anemia, denies bruising, denies excessive bleeding  ENDOCRINE: denies excessive thirst or urination; denies unexpected wt gain or wt loss  ALLERGY/IMM.: denies food or seas Yy=310  MPV=10.7    CMP:10/13/20  Glucose=283  BUN=88  Cr=1.70  Bilirubin=0.41  Protein=4.8  Albumin=3.2  Sodium=136  Potassium=5.0  Chloride=101  CO2=24  ALT=12  AST=21  Alk Phosphatase=60  Calcium=8.5  GFR=29     SARS-Cov-2 RNA RT-PCR         SARS-Cov-2 [  ]  Dialysis      Hospital score:     Attribute:  [ ]Points if positive:    Low hemoglobin at discharge (<12g/dl)                                [1]  Followed by Oncology service                                              [2]  Low sodium level at d labile BG 10/12-10/14: Fastin-512 EA:234-841  -ACHS, notify MD/NP for blood sugar <60 or >550  -LCS diet  -SLP consult  -Patient to be up in bed for meals at a 45 degree angle or in w/c.    -No straws until evaluated by SLP  -Nobles Hoit 10 units q am  -

## 2020-10-20 NOTE — PROGRESS NOTES
Russ Pool, 3/24/1945, 77year old, female    Chief Complaint:  Patient presents with:   Follow - Up: Hyperglycemia       Subjective:  76 y/o female with PMH of MS, H/o stroke in 2000, DMT1(junvenile type)-labile BG, HTN, HLD, Dementia, hypothyroidism cerumen.-Upper teeth missing  NECK: supple; FROM; no JVD, no TMG, no carotid bruits  BREAST: --- deferred  RESPIRATORY:---diminished bilaterally  CARDIOVASCULAR: S1, S2 normal, RRR; no S3, no S4; , no click, no murmur  ABDOMEN:  normal active BS+, soft, no 1.12-improved  -Monitor  -Nephrology consult     HTN/HLD  -Vitals q shift  -Lisinopril 2.5 mg qd, hold for sbp<100 or hr<60  -Pravastatin 80 mg qd     DMT1-uncontrolled  -h/o labile BG: 10/15-10/19 Fastin-534 PP:147-600  -ACHS, notify MD/NP for blood

## 2020-10-21 NOTE — PROGRESS NOTES
Jasmin Carbajal, 3/24/1945, 77year old, female    Chief Complaint:  Patient presents with:   Follow - Up: Right leg pain, hyperglycemia       Subjective:  78 y/o female with PMH of MS, H/o stroke in 2000, DMT1(junvenile type)-labile BG, HTN, HLD, Dementia distress, --- appears frail  LINES, TUBES, DRAINS:  none  SKIN: pale, warm, dry  WOUND: +buttock PU, both heels with PUs  EYES: PERRLA, EOMI, sclera anicteric, conjunctiva normal; there is no nystagmus, no drainage from eyes  HENT: normocephalic; normal no 100 mg bid  -Celebrex 200 mg qd  -Ibuprofen-discontinued  -Norco 5/325 mg q6h prn  -Reposition patient every two hours while in w/c and bed  -Fall precautions  -ELOS:  TBD  -Anticipated discharge date:  TBD-insurance; updates due on 10/19-pending  - fabiano

## 2020-10-22 NOTE — TELEPHONE ENCOUNTER
Denice Ramirez RN at [facility] notified this provider the [facility] results:  BUN: 71, Cr: 1.17, Potassium: 5.5. Ordered IVFs X 1 bag, Kayexalate 30 g X 2 doses and held Lisinopril X 3 days. Repeat BMP on 10/24. MD notified.

## 2020-10-27 NOTE — PROGRESS NOTES
Irma Dunbar, 3/24/1945, 77year old, female    Chief Complaint:  Patient presents with:   Follow - Up: Right hip and leg pain and right side of abdomen       Subjective:  78 y/o female with PMH of MS, H/o stroke in 2000, DMT1(junvenile type)-labile BG, bruits  BREAST: --- deferred  RESPIRATORY:---diminished bilaterally  CARDIOVASCULAR: S1, S2 normal, RRR; no S3, no S4; , no click, no murmur  ABDOMEN:  normal active BS+, soft, nondistended; no organomegaly, no masses; no bruits; nontender, no guarding, no fluids  -Monitor  -Nephrology consult     HTN/HLD  -Vitals q shift  -Lisinopril 2.5 mg qd, hold for sbp<100 or hr<60  -Pravastatin 80 mg qd     DMT1-labile  -h/o labile BG: 10/23 Fastin-380 PP:  -ACHS, notify MD/NP for blood sugar <60 or >550

## 2020-10-29 NOTE — PROGRESS NOTES
Robertdenis Flores, 3/24/1945, 77year old, female    Chief Complaint:  Patient presents with:   Follow - Up: Right side abd pain, high bun level       Subjective:  78 y/o female with PMH of MS, H/o stroke in 2000, DMT1(junvenile type)-labile BG, HTN, HLD, De bilaterally  CARDIOVASCULAR: S1, S2 normal, RRR; no S3, no S4; , no click, no murmur  ABDOMEN:  normal active BS+, soft, nondistended; no organomegaly, no masses; no bruits; nontender, no guarding, no rebound tenderness, pain on right side with touch.   : Test performed at [facility], [address],   [place], [address], Micky Smith M.D., Medical   Director, [facility] [id number]   MICROSCOPIC         RED BLOOD CELLS/HPF   2  /HPF  NONE - 5    Final           WHITE BLOOD CELLS/HPF   37  /HPF 07:54:01 PM  .  Dajuan Luna is Accredited by the [employer]    All lab results from [address] [place] [address] and plan:  Pain on right side-suspect injury/OA/Impaired Functional Mobility/Multiple Sclerosis/H/o Stroke-Left sided weakness-in 2000  -knee qd  -Tab-a-Yoni qd  -Fish Oil 500 mg qd     Bowel Regimen  -Colace 100 mg bid  -Dulcolax suppository 10 mg qd prn     Covid-19Tests-Rapid SARS PCR 10/8-negative  10/12-negative     Labs  -CBC and CMP weekly     Follow up appointments  -Dr. Marcelino Ramirez in

## 2020-11-03 NOTE — PROGRESS NOTES
Falguni De Jesus, 3/24/1945, 77year old, female    Chief Complaint:  Patient presents with:   Follow - Up: ESBL in urine, elevated bun and hyperkalmeia       Subjective:  76 y/o female with PMH of MS, H/o stroke in 2000, DMT1(junvenile type)-labile BG, HTN missing  NECK: supple; FROM; no JVD, no TMG, no carotid bruits  BREAST: --- deferred  RESPIRATORY:---diminished bilaterally  CARDIOVASCULAR: S1, S2 normal, RRR; no S3, no S4; , no click, no murmur  ABDOMEN:  normal active BS+, soft, nondistended; no organo <=16  Ampicillin                       R                  >16  Amox/K Clav                      I                  16/8  Aztreonam                        ESBL               >16  Ceftriaxone                      ESBL               >32  Ceftazidime CULTURE, URINE -   PRELIMINARY FINDINGS: 10-50,000 COL/ML Gram Negative Bacilli  [facility] at [facility], [address],   [place], [address], Micky Villalobos M.D., Medical   Director, [facility] [id number]      CULTURE, URINE   See Attachmen manage caring for patient at home    Right side pain  -KUB-negative  -UA and C&S+ for ESBL   -Started Bactrim, then changed to Cipro 500 mg bid X 7 days d/t high bun and hyperkalmia  -Probiotic 1 tablet bid X 14 days    Hyperkalemia-resolved  -Potassium: 5

## 2020-11-05 NOTE — PROGRESS NOTES
Kale De Jesus, 3/24/1945, 77year old, female    Chief Complaint:  Patient presents with: Follow - Up:  Watery eyes, running nose, congestion       Subjective:  78 y/o female with PMH of MS, H/o stroke in 2000, DMT1(junvenile type)-labile BG, HTN, HLD, (36.6 °C)   Resp 18   Wt 125 lb 11.2 oz (57 kg)   SpO2 98%   BMI 19.11 kg/m²      PHYSICAL EXAM:  GENERAL HEALTH: well developed, well nourished, in no apparent distress, --- appears frail  LINES, TUBES, DRAINS:  none  SKIN: pale, warm, dry  WOUND: Michelle [place] Stroke-Left sided weakness-in 2000  -knee/hip xr no fracture  -seen by ortho, no interventions planned, WBAT  -PT/OT to eval and treat with modalities  -Physiatry Consult  -Baclofen 20 mg tid  -Zanaflex 2 mg bid  -Gabapentin 100 mg bid  -Celebrex 200 mg qd below     Dementia  -Aricept 10 mg qd  -F/u with neuro as listed below     Hypothyroidism  -TSH:  1.155  -Continue LT 75 mg qd     Osteoporosis  -Prolia 60 mg sq every 6 months     Supplements  -D3 2000 units qd  -Tab-a-Yoni qd  -Fish Oil 500 mg qd     [place]

## 2020-11-10 NOTE — PROGRESS NOTES
Ines Doss, 3/24/1945, 77year old, female     Chief Complaint:  Patient presents with:   Follow - Up: Elevated BUN       Subjective:  76 y/o female with PMH of MS, H/o stroke in 2000, DMT1(junvenile type)-labile BG, HTN, HLD, Dementia, hypothyroidism EOMI, sclera anicteric, conjunctiva normal; there is no nystagmus, no drainage from eyes  HENT: normocephalic; normal nose, no nasal drainage, mucous membranes pink, moist, pharynx no exudate, no visible cerumen.-Upper teeth missing  NECK: supple; FROM; no 100 mg bid  -Celebrex 200 mg qd  -Ibuprofen-discontinued  -Norco 5/325 mg q6h prn  -Reposition patient every two hours while in w/c and bed  -Fall precautions  -Anticipated discharge date: 11/11/20 for transition to LTC at [address] [facility] consulted, family likely 10/8-negative  10/12-negative; 11/5-negative     Labs  -Repeat BMP on 11/10/20  -Flu swab-pending  -CBC and CMP weekly     Follow up appointments  -Dr. Stewart Cervantes in 7 to 10 days after discharge from [place] [address]  --F/u with Dr. Flex Ornelas, Neuro in 6 months  -D

## 2020-11-11 PROBLEM — N18.4 CKD (CHRONIC KIDNEY DISEASE) STAGE 4, GFR 15-29 ML/MIN (HCC): Status: ACTIVE | Noted: 2020-01-01

## 2020-11-11 NOTE — TELEPHONE ENCOUNTER
Vickey Chan with [facility] called stating pt's BUN is 66 on recent labs - Call back # [phone] ext [phone]

## 2020-11-11 NOTE — PROGRESS NOTES
Russ Pool, 3/24/1945, 77year old, female is being discharged from Facility: [address]    Date of Admission:10/12/20    Date of Discharge to [address] [place] [address] Drive: 11/12/20                          Admitting Hector Zelaya JVD, no TMG, no carotid bruits  BREAST: --- deferred  RESPIRATORY:---diminished bilaterally  CARDIOVASCULAR: S1, S2 normal, RRR; no S3, no S4; , no click, no murmur  ABDOMEN:  normal active BS+, soft, nondistended; no organomegaly, no masses; no bruits; no 314 (H)     Medication Reconciliation Completed:  Yes    Discharge Diagnoses w/ current management:  Pain on right side-suspect injury/OA/Impaired Functional Mobility/Multiple Sclerosis/H/o Stroke-Left sided weakness-in 2000  -knee/hip xr no fracture  -see qd  -F/u with neuro as listed below     Hypothyroidism  -TSH:  1.155  -Continue LT 75 mg qd     Osteoporosis  -Prolia 60 mg sq every 6 months     Supplements  -D3 2000 units qd  -Tab-a-Yoni qd  -Fish Oil 500 mg qd     Bowel Regimen  -Colace 100 mg bid  -Du

## 2020-12-08 PROBLEM — N18.4 CKD (CHRONIC KIDNEY DISEASE) STAGE 4, GFR 15-29 ML/MIN (HCC): Status: RESOLVED | Noted: 2020-01-01 | Resolved: 2020-01-01

## 2020-12-21 PROBLEM — J12.82 PNEUMONIA DUE TO COVID-19 VIRUS: Status: ACTIVE | Noted: 2020-01-01

## 2020-12-21 PROBLEM — D64.9 ANEMIA: Status: ACTIVE | Noted: 2020-01-01

## 2020-12-21 PROBLEM — N39.0 SEPSIS DUE TO URINARY TRACT INFECTION (HCC): Status: ACTIVE | Noted: 2020-01-01

## 2020-12-21 PROBLEM — A41.9 SEPSIS DUE TO URINARY TRACT INFECTION (HCC): Status: ACTIVE | Noted: 2020-01-01

## 2020-12-21 PROBLEM — E87.3 METABOLIC ALKALOSIS: Status: ACTIVE | Noted: 2020-01-01

## 2020-12-21 PROBLEM — E87.0 HYPERNATREMIA: Status: ACTIVE | Noted: 2020-01-01

## 2020-12-21 PROBLEM — N30.00 ACUTE CYSTITIS WITHOUT HEMATURIA: Status: ACTIVE | Noted: 2020-01-01

## 2020-12-21 PROBLEM — U07.1 PNEUMONIA DUE TO COVID-19 VIRUS: Status: ACTIVE | Noted: 2020-01-01

## 2020-12-21 PROBLEM — E87.6 HYPOKALEMIA: Status: ACTIVE | Noted: 2020-01-01

## 2020-12-21 NOTE — ED PROVIDER NOTES
Patient Seen in: [facility] Emergency Department      History   Patient presents with:  Difficulty Breathing  Covid    Stated Complaint: covid temp o2 desat    HPI    Patient is 61-year-old female currently nursing home resident.   Patient has histor ARM/ELBOW SURGERY UNLISTED Left 10/11/18--Dr. Sevilla Close    fractional lengthening of biceps, brachialis, flexor carpi radialis, flexor carpi ulnaris, flexor pollicis longus and flexor digitorum superficialis & flexor digitorum profundus to the index, middle an Abnormal; Notable for the following components:       Result Value    Pro-Beta Natriuretic Peptide 3,994 (*)     All other components within normal limits   COMP METABOLIC PANEL (14) - Abnormal; Notable for the following components:    Sodium 150 (*)     P Lactic Acid Arterial 2.9 (*)     All other components within normal limits   LACTIC ACID, PLASMA - Abnormal; Notable for the following components:    Lactic Acid 3.1 (*)     All other components within normal limits   ABG PANEL W ELECT AND LACTATE - Abnorm noted on EKG Report.   Rate: 77  Rhythm: Sinus Rhythm  Reading: Normal sinus rhythm, no acute changes              Chest x-rayImaging findings are consistent with multifocal COVID-19 pneumonia.             CTA chest no PE, Covid pneumonia        MDM      Pa encounter diagnosis)  Acute cystitis without hematuria  Sepsis due to urinary tract infection ([facility] [address]    Disposition:  Admit  12/21/2020  8:45 pm    Follow-up:  No follow-up provider specified.         Medications Prescribed:  Current Discharge Medication Lis

## 2020-12-21 NOTE — RESPIRATORY THERAPY NOTE
Patient brought to ed via ambulance. nrb mask with surgical mask over. Sat 80's. Switched patient to A0 to place on bipap.  abg drawn po2 low. Intubated patient placed on ventilator.

## 2020-12-21 NOTE — ED INITIAL ASSESSMENT (HPI)
Pt brought from NH for desat to 86% , declining the past 2 days and is covid +. Pt hx of stroke defcits on the left with contractures. Pt has fever. Pt norm is A&O x4. Pt responds to name and knows shekhar. Pt denies any other complaints.

## 2020-12-21 NOTE — H&P
[facility] hospitalist H+P  PCP Earlene Salazar MD  CC hypoxia  HPI 78 yo female with multiple problems including but not limited to MS, DM, HTN, HL came from NH with hypoxia, was intubated in ER. Unable to obtain from patient information.  Cant obtain information file    Social Needs      Financial resource strain: Not on file      Food insecurity        Worry: Not on file        Inability: Not on file      Transportation needs        Medical: Not on file        Non-medical: Not on file    Tobacco Use      Smoking mouth,and trouble with bowel movements  Med  No current facility-administered medications on file prior to encounter. •  DM-guaiFENesin ER  MG Oral Tablet 12 Hr, Take 1 tablet by mouth every 12 (twelve) hours. , Disp: , Rfl:     •  HYDROcodone-ace Subcutaneous Solution Pen-injector, Inject 10 Units into the skin every morning. (Patient taking differently: Inject 14 Units into the skin nightly.  ), Disp: 45 mL, Rfl: 0    •  CONTOUR NEXT TEST In Vitro Strip, Test 3 times daily. Test 3 times daily.  Typ endocrine as outpatient   Steroid IV ordered in ER  Endocrine consult        HTN, HL BP is not elevated    MS, Dementia; pt is NPO at this time    Rao Barrett MD  [facility] hospitalist  [phone]

## 2020-12-22 NOTE — PROGRESS NOTES
ICU  Critical Care APRN Progress Note    NAME: Martina Ramírez - ROOM: [address] [place] [address] - MRN: [id number] - Age: 77year old - :3/24/1945    History Of Present Illness:  Martina Ramríez is a 77year old female with PMHx significant for MS, DM2, CVA, HTN, HL, 16// FIO2 40/PEEP 10.     Physical Exam:    General Appearance: Alert, cooperative, c/o pain, elderly thin, left arm contracted  Neck: No JVD, neck supple, no adenopathy, trachea midline, no carotid bruits  Lungs: Diminished to auscultation bilaterall 12/21/2020    INR 1.26 12/21/2020    MG 2.1 12/21/2020       Assessment/Plan:  1. Acute respiratory failure 2/2 COVID PNA  -Mechanical ventilation order set  -Sedation with Precedex  -Fentanyl PRN  -ABG in am    2.  COVID infection  -Trend inflammatory hema

## 2020-12-22 NOTE — PROGRESS NOTES
[facility] hospitalist daily note  Pt was seen/examined on 12/22/20    S;patient is in ICU  Per RN pt was able to nod in response to questions.  currently sedated  Had bradycardia  PICC line placement today    Medications in Epic    PE    12/22/20  1230   BP:    P

## 2020-12-22 NOTE — ED NOTES
Floor report given to ICU [place] [id number]. Awaiting CT results then patient will be transferred to the floor. Kianna Zeng RN given bedside report in ER. Patient is going to CTA chest with SANTOSH Sales/Respiratory Therapist/monitor.

## 2020-12-22 NOTE — CM/SW NOTE
12/22/20 1000   CM/SW Screening   Referral Source Social Work (self-referral)   [address] staff; Chart review   Patient's [address] [facility] Name [facility]   Discharge Needs   Anticipated D/C needs Nursing ho

## 2020-12-22 NOTE — PLAN OF CARE
Received pt from ED to room 464. Pt arrived to unit intubated. Pt alert following commands, and nodding head appropriately. Precedex started per orders for sedation. ID consulted, see orders. Endo consulted, see orders.  Pt hypoglycemic x1 overnight, D5 1/2

## 2020-12-22 NOTE — DIETARY NOTE
[facility] [address] [facility]    Pt does not meet malnutrition criteria at this time.     NUTRITION DIAGNOSIS/PROBLEM:    Inadequate energy intake related to decreased ability to consume sufficient energy intake as evidenced by current v : 52.2 kg (115 lb)  02/14/20 : 56.2 kg (124 lb)  12/10/19 : 54.4 kg (120 lb)  10/24/19 : 54.4 kg (120 lb)  06/04/19 : 56.7 kg (125 lb)    NUTRITION:  Diet: NPO +TF  Oral Supplements: N/A    FOOD/NUTRITION RELATED HISTORY:  Appetite: DIONICIO  Intake: 0%-NPO  In

## 2020-12-22 NOTE — PROGRESS NOTES
Patient remained on ventilator overnight. Weaned FiO2 as tolerated. No other RT changes made. No secretions via ETT. Will continue to monitor.       12/22/20 0406   Vent Information   $ RT Standby Charge (per 15 min) 1   Ventilator Initiation 12/21/20   Clifford Hinojosa

## 2020-12-22 NOTE — CONSULTS
NAME: Kale De Jesus - ROOM: 702/672-F - MRN: [id number] - Age: 77year old - :  3/24/1945    Date of Admission: 2020  4:02 PM  Admission Diagnosis: Acute cystitis without hematuria [N30.00]  Sepsis due to urinary tract infection ([facility] [address] [A41.9, N3 lengthening of biceps, brachialis, flexor carpi radialis, flexor carpi ulnaris, flexor pollicis longus and flexor digitorum superficialis & flexor digitorum profundus to the index, middle and ring finger        •  docusate sodium 100 MG Oral Cap, Take 100 capsule, Rfl: 3    •  Donepezil HCl 10 MG Oral Tab, Take 1 tablet (10 mg total) by mouth nightly., Disp: 90 tablet, Rfl: 3    •  baclofen 20 MG Oral Tab, Take 1 tablet (20 mg total) by mouth 3 (three) times daily. , Disp: 270 tablet, Rfl: 3    •  LEVOTHYROX Solution, Inject 1 mL (60 mg total) into the skin every 6 (six) months. (Patient taking differently: Inject 60 mg into the skin every 6 (six) months. NH MAR states \"need to verify when last given.   Medication on HOLD from 10/20 - 11/19\" ), Disp: 1 [place]ring tablet by mouth every 12 (twelve) hours. , Disp: , Rfl:     •  HYDROcodone-acetaminophen 5-325 MG Oral Tab, Take 1 tablet by mouth every 6 (six) hours as needed for Pain., Disp: 10 tablet, Rfl: 0    •  HUMALOG KWIKPEN 100 UNIT/ML Subcutaneous Solution Pen-i tablet by mouth daily. , Disp: , Rfl:     •  insulin aspart 100 UNIT/ML Subcutaneous Solution, Inject into the skin See Admin Instructions.  SS- 150-200=1units, 201-250=2units, 122-776-9iofnv, 301-350=4units, 351-400=5units, 401-450=7units, 451-500=9units, 5 Net 2063.58 ml       Exam:  Gen - Intubated and sedated   HEENT - PERRLA  Neck - Supple, no JVD  Lungs - Mechanical breath sounds bilaterally   CV - Normal, normal s1 and s2, No murmurs, rubs or gallops, No P2  Abd - +BS, soft, non-tender, non-distended

## 2020-12-22 NOTE — RESPIRATORY THERAPY NOTE
SPOKE WITH DR John Grace WITH REPEAT ABG.   INCREASE PEEP TO +10 NO ORDERS FOR MORE ABGS GO BY SAT MONITOR

## 2020-12-22 NOTE — CONSULTS
[address] [facility]  TEL: [phone]  FAX: [phone]    Jasmin Carbajal Patient Status:  Inpatient    3/24/1945 MRN [id number]   [facility] 4SW-A Attending Tanmay Rodriguez MD   Hosp Day # 1 PCP Matt Reno ABDOM HYSTERECTOMY     • UPPER ARM/ELBOW SURGERY UNLISTED Left 10/11/18--Dr. Ascencion Key    fractional lengthening of biceps, brachialis, flexor carpi radialis, flexor carpi ulnaris, flexor pollicis longus and flexor digitorum superficialis & flexor digitorum pr mg, Oral, Q6H PRN **OR** acetaminophen (TYLENOL) 160 MG/5ML oral liquid 650 mg, 650 mg, Oral, Q6H PRN **OR** acetaminophen (TYLENOL) 650 MG rectal suppository 650 mg, 650 mg, Rectal, Q6H PRN  •  fentanyl (SUBLIMAZE) 1000 mcg/100 ml NS premix infusion, 25-1 Positive bowel sounds. Musculoskeletal: Full range of motion of all extremities. No swelling noted. Integument: No lesions. No erythema. Psychiatric: Appropriate mood and affect. Neurologic: No focal neurological deficits.     Laboratory Data:  Lab Re Controlled type 2 diabetes mellitus with complication, without long-term current use of insulin (HCC)      Covid pneumonia. Date of diagnosis 1217. Onset of symptoms approximately 12 16-17. Mainly lethargy.   Presented to the hospital on December 21 with

## 2020-12-22 NOTE — PROGRESS NOTES
[facility] Pharmacy Note:  Initiation of Stress Ulcer Prophylaxis     Jeremiah Raya is a 77year old patient and meets criteria for the initiation of stress ulcer prophylaxis based on the presence of: Mechanical Ventilation.     famotidine (PEPCID) 20 mg IV/PO

## 2020-12-22 NOTE — CONSULTS
ENDOCRINOLOGY CONSULTATION    Attending physician:  Nelson Bacon MD  Consulting physican:  Prosper Ortiz MD    Admission Date:  12/21/2020  Consultation Date:  12/22/2020      Reason for Consultation: Mgmt of Type 1 DM    Chief Complaint:   Ad SURGICAL HISTORY    Past Surgical History:   Procedure Laterality Date   • BICEPS TENDON REPAIR Left 10/11/2018    Performed by Kristopher Abraham MD at [facility] MAIN OR   • FEMUR FRACTURE SURGERY  12/2017    right   • FEMUR IM NAIL Right 12/2/2017    Performed by Tablet 12 Hr, Take 1 tablet by mouth every 12 (twelve) hours. , Disp: , Rfl:     •  HYDROcodone-acetaminophen 5-325 MG Oral Tab, Take 1 tablet by mouth every 6 (six) hours as needed for Pain., Disp: 10 tablet, Rfl: 0    •  HUMALOG KWIKPEN 100 UNIT/ML Subcut Oral Tab, Take 1 tablet by mouth daily. , Disp: , Rfl:     •  insulin aspart 100 UNIT/ML Subcutaneous Solution, Inject into the skin See Admin Instructions.  SS- 150-200=1units, 201-250=2units, 199-477-4rdmmp, 301-350=4units, 351-400=5units, 401-450=7units, Rectal, Q24H PRN    •  Oyster Shell Calcium/D 500-200 MG-UNIT TABS 1 tablet, 1 tablet, Oral, BID    •  Donepezil HCl (ARICEPT) tab 10 mg, 10 mg, Oral, Nightly    •  gabapentin (NEURONTIN) cap 100 mg, 100 mg, Oral, BID    •  [START ON 12/23/2020] Levothyrox Intravenous, Q24H    •  fentaNYL citrate (SUBLIMAZE) 0.05 MG/ML injection 25 mcg, 25 mcg, Intravenous, Q30 Min PRN    Or    •  fentaNYL citrate (SUBLIMAZE) 0.05 MG/ML injection 50 mcg, 50 mcg, Intravenous, Q30 Min PRN    •  acetaminophen (TYLENOL) tab 650 Not on file    Tobacco Use      Smoking status: Former Smoker        Packs/day: 1.00        Years: 10.00        Pack years: 8        Quit date: 10/8/1976        Years since quittin.2      Smokeless tobacco: Never Used    Substance and Sexual Activity 8. 5   CALCULATED OSMOLALITY      275 - 295 mOsm/kg 316 (H)   eGFR NON-AFR.  AMERICAN      >=60 49 (L)   eGFR       >=60 57 (L)   AST (SGOT)      15 - 37 U/L 65 (H)   ALT (SGPT)      13 - 56 U/L 29   ALKALINE PHOSPHATASE      55 - 142 U/L 73

## 2020-12-22 NOTE — ED NOTES
Assumed care, report received from Y[facility].  Pt taken to CT via cart on a vent with RT and monitor in place

## 2020-12-22 NOTE — RESPIRATORY THERAPY NOTE
Received pt on VC+ 16/400/60%/+10. Pt started desaturating and RN changed FIO2 to 80%. Weaning down as able to. She is on 70% now. BS diminished. ABG drawn thins AM no changes made. Will continue to monitor.

## 2020-12-23 PROBLEM — Z71.89 COUNSELING REGARDING ADVANCE CARE PLANNING AND GOALS OF CARE: Status: ACTIVE | Noted: 2020-01-01

## 2020-12-23 PROBLEM — Z51.5 PALLIATIVE CARE ENCOUNTER: Status: ACTIVE | Noted: 2020-01-01

## 2020-12-23 NOTE — PLAN OF CARE
Rec'd report from previous RN, care assumed at [facility], pt assessed per flow sheets. Pt sedated with propofol, minimally follows commands with repeated questioning. Pt contracted to upper extremities, right more sa than left.   Pt intubated with #7.5 ETT, 23

## 2020-12-23 NOTE — CONSULTS
[facility]  Report of Consultation    Russ Pool Patient Status:  Inpatient    3/24/1945 MRN [id number]   [facility] 4SW-A Attending [place] Puckett MD   Hosp Day # 1 PCP Shireen Lacy MD     Reason for Consultation:  bradyc 2/25/15    Cystoscopy- Dr. Tawny Adam   • TOTAL ABDOM HYSTERECTOMY     • UPPER ARM/ELBOW SURGERY UNLISTED Left 10/11/18--Dr. Harriet Cuellar    fractional lengthening of biceps, brachialis, flexor carpi radialis, flexor carpi ulnaris, flexor pollicis longus and flexor digi mg, Oral, Daily  •  Vitamin D3 cap 2,000 Units, 2,000 Units, Oral, Daily  •  ascorbic acid (VITAMIN C) tab 500 mg, 500 mg, Oral, Daily  •  insulin detemir (LEVEMIR) 100 UNIT/ML flextouch 12 Units, 12 Units, Subcutaneous, [email or web address]  •  0.9% NaCl infusion, 20 mg, Intravenous, Nightly  •  dexamethasone Sodium Phosphate (DECADRON) 4 MG/ML injection 6 mg, 6 mg, Intravenous, Daily  •  [START ON 12/23/2020] remdesivir 100 mg in sodium chloride 0.9% 250 mL IVPB, 100 mg, Intravenous, Q24H    Review of Systems:  All

## 2020-12-23 NOTE — COVID NURSING ASSESSMENT
Assumed care at 0730. Patient intubated on no sedation. Patient denied any pain. Patient was nodding appropriately to questions. Oxygen saturations were decreasing and patient was not tolerating the ventilator this am.  Propofol was started.   Hypotensi

## 2020-12-23 NOTE — PROGRESS NOTES
Spoke with Ivory Cheo, , regarding condition. He agrees that Yahir would not want resuscitation if her heart was to stop. Gisella Robins states that he would like to make patient DNAR/full.   Current treatment of intubation and vasopressor to contin

## 2020-12-23 NOTE — RESPIRATORY THERAPY NOTE
Pt remain stable on ventilator RR 16  FiO2 40% Peep 5. Breath sounds are clear diminish bilaterally with minimal secretions.

## 2020-12-23 NOTE — PROGRESS NOTES
[facility]  Cardiology Progress Note    Martina Ramírez Patient Status:  Inpatient    3/24/1945 MRN [id number]   [facility] 4SW-A Attending Graham Carvajal MD   Hosp Day # 2 PCP Kiya Magdaleno MD     Assessment:  Sinus bradycardia Subcutaneous [email or web address]   • piperacillin-tazobactam  3.375 g Intravenous Q8H   • azithromycin  500 mg Intravenous Q24H   • docusate sodium  100 mg Oral BID   • Chlorhexidine Gluconate  15 mL Mouth/Throat [email or web address]   • famoTIDine  20 mg Oral Nightly

## 2020-12-23 NOTE — PROGRESS NOTES
ENDOCRINOLOGY PROGRESS NOTE    Typed by Christofer Bains MD on 12/23/2020      S:  Pt with Covid. Notes reviewed - pt still intubated with supportive care. Pt DNR with full treatment. Palliative care and cardiology consulted.  Plan to start tube fee 15 - 37 U/L 42 (H)   ALT (SGPT)      13 - 56 U/L 22   ALKALINE PHOSPHATASE      55 - 142 U/L 86         Component      Latest Ref Rng & Units 12/23/2020   WBC      4.0 - 11.0 x10(3) uL 8.1   RBC      3.80 - 5.30 x10(6)uL 3.03 (L)   Hemoglobin      12.0 -

## 2020-12-23 NOTE — PROGRESS NOTES
[facility] hospitalist daily note  Patient was seen/examined on 12/23/20    S: pt intubated    Medications in Epic    PE    12/23/20  1335   BP:    Pulse: 59   Resp: 25   Temp:      Gen intubate  HEENT ET tube in place, anicteric, OG in place  CV nl S1/S2  Pulm n

## 2020-12-23 NOTE — PROGRESS NOTES
[facility]                INFECTIOUS DISEASE PROGRESS NOTE    Kale De Jesus Patient Status:  Inpatient    3/24/1945 MRN [id number]   [facility] 4SW-A Attending Ember Schmitt MD   Hosp Day # 2 PCP Mai Woods MD     Ant ROUTINE     Status: Abnormal    Collection Time: 12/21/20  5:38 PM    Specimen: Urine, clean catch   Result Value Ref Range    Urine Culture >100,000 CFU/ML Aerococcus urinae (A) N/A   2.  BLOOD CULTURE     Status: None (Preliminary result)    Collection Ti

## 2020-12-23 NOTE — PROGRESS NOTES
Current vent settings noted below. Continuing to wean support, O2 at 50%, PEEP at +8 and tolerating well. Will continue to wean support as tolerated.      12/23/20 8692   Vent Information   $ RT Standby Charge (per 15 min) 1  (vent check)   Ventilator Initi

## 2020-12-23 NOTE — PROGRESS NOTES
Critical Care Progress Note     Assessment / Plan:  1. Acute hypoxemic respiratory failure   - Intubated on 12/21  - wean vent as tolerated   - 2/2 COVID-19 pneumonia   - weaning trials once able to wean vent further   2.  COVID-19 pneumonia   - Diagnosed 1

## 2020-12-23 NOTE — PROGRESS NOTES
12/22/20 2120   Vent Information   $ RT Standby Charge (per 15 min) 1   Ventilator Initiation 12/21/20   Ventilation Day(s) 2   Interface Invasive   Vent Type    Vent -10   Vent Mode VC+   Settings   FiO2 (%) 60 %   Resp Rate (Set) 16   Vt (S adequate sample for sputum culture. O2 weaned down to 60% and tolerated well. No further changes made to the vent. Will continue to monitor. Plans:   - Will continue to monitor pt and wean vent settings as tolerated. Continue to monitor.

## 2020-12-24 NOTE — PROGRESS NOTES
Patient remained on ventilator overnight. No RT changes made. Will continue to monitor.       12/24/20 0615   Vent Information   $ RT Standby Charge (per 15 min) 1   Ventilator Initiation 12/21/20   Ventilation Day(s) 4   Interface Invasive   Vent Type [id number]

## 2020-12-24 NOTE — CERTIFICATION
**Certification    PHYSICIAN Certification of Need for Inpatient Hospitalization    Based on the her current state of illness, Mercedez Jaime requires inpatient hospitalization for her respiratory failure, COVID Pneumonia

## 2020-12-24 NOTE — PROGRESS NOTES
12/23/20 1919   Vent Information   $ RT Standby Charge (per 15 min) 1   Ventilator Initiation 12/21/20   Ventilation Day(s) 3   Interface Invasive   Vent Type    Vent -10   Vent Mode VC+   Settings   FiO2 (%) 40 %   Resp Rate (Set) 16   Vt (S 60*   ABGHCO3 18.0*   ABGBE -5.5*   TEMP 98.8   RACHEL Positive   SITE Right Radial   DEV    THGB 8.9*       Notes:   - Pt received on the above settings. No vent changes made this shift. Suctioning a minimal amount of secretions through the ETT.  Will

## 2020-12-24 NOTE — PROGRESS NOTES
Critical Care Progress Note     Assessment / Plan:  1. Acute hypoxemic respiratory failure   - Intubated on 12/21  - wean vent as tolerated   - daily SBT   - 2/2 COVID-19 pneumonia   2.  COVID-19 pneumonia   - Diagnosed 12/17/20  - IV steroids   - Remdesivi

## 2020-12-24 NOTE — COVID NURSING ASSESSMENT
Assumed care at [place] [address]. Patient sedated on Propofol, tolerating ventilator. SBAT was completed and pt was extubated without complications this am.  Pt placed on vapotherm 40L/100%. Weak cough and throat clearing but patient is able to clear secretions.   Casey Domingo

## 2020-12-24 NOTE — DIETARY NOTE
[address] [facility] ASSESSMENT    Pt does not meet malnutrition criteria at this time.     NUTRITION DIAGNOSIS/PROBLEM:    Inadequate energy intake related to decreased ability to consume sufficient energy intake as evidenced by current Encounters:  12/23/20 : 60.2 kg (132 lb 11.5 oz)  11/05/20 : 57 kg (125 lb 11.2 oz)  10/14/20 : 56.9 kg (125 lb 6.4 oz)  10/08/20 : 61.2 kg (135 lb)  09/02/20 : 52.2 kg (115 lb)  07/29/20 : 52.2 kg (115 lb)  02/14/20 : 56.2 kg (124 lb)  12/10/19 : 54.4 kg

## 2020-12-24 NOTE — SLP NOTE
ADULT SWALLOWING EVALUATION    ASSESSMENT    ASSESSMENT/OVERALL IMPRESSION:  Order received for BSE to r/o aspiration. Pt was intubated from 12/21-12/24 and currently on 40L 100%Vapotherm.  Pt presented from SNF with worsening SOB, decreased responsiveness, precautions include upright position, ensure Pt is awake/alert and able to engage in feeding/swallowing   Compensatory Strategies Recommended: Liquids via spoon  Aspiration Precautions: Upright position; Slow rate;Small bites and sips; No straw  Medication A teeth)  Symmetry: Reduced left facial;Unable to assess  Strength: Reduced left facial;Unable to assess     Range of Motion: Reduced left facial;Unable to assess       Voice Quality: Clear  Respiratory Status: Vapotherm  Consistencies Trialed:  Thin liquids;

## 2020-12-24 NOTE — PROGRESS NOTES
[facility] hospitalist daily note  Patient was seen  on 12/24/20     S: pt intubated     Medications in Epic     PE pulse 61 on the monitor, /38, O2 sat 91%  Gen intubate  HEENT ET tube in place, anicteric, OG in place  CV regular on the monitor  Pulm vent

## 2020-12-24 NOTE — PROGRESS NOTES
ENDOCRINOLOGY PROGRESS NOTE    Typed by Ban Maharaj MD on 12/24/2020      S:  Pt with Covid. Notes reviewed - pt still intubated with supportive care but waking up and nodding appropriately per nurse notes.       O:  /59   Pulse 78   Temp AMERICAN      >=60 103   AST (SGOT)      15 - 37 U/L 30   ALT (SGPT)      13 - 56 U/L 17   ALKALINE PHOSPHATASE      55 - 142 U/L 91         Component      Latest Ref Rng & Units 12/24/2020   WBC      4.0 - 11.0 x10(3) uL 7.2   RBC      3.80 - 5.30 x10(6)u

## 2020-12-24 NOTE — PLAN OF CARE
Assumed care of patient at 1. Patient remains intubated and adequately sedated. Patient waking up and nodding appropriately to questions. O2 sats >92%. SBP ranges from . Remains on levophed. No complaints of pain.  Patient resting comfortably at th

## 2020-12-24 NOTE — PHYSICAL THERAPY NOTE
Order received for PT eval per functional mobility screen. Pt currently intubated and not medically appropriate for therapy at this time. Will place patient ON HOLD. Please re-order therapy, as appropriate.

## 2020-12-24 NOTE — CONSULTS
[facility]  Report of Inpatient Wound Care Consultation     Jeremiah Raya Patient Status:  Inpatient    3/24/1945 MRN [id number]   [facility] 4SW-A Attending Jennifer Whitney MD   Hosp Day # 3 PCP Roque Gan MD     REASON FOR CON Smokeless tobacco: Never Used    Alcohol use:  Yes      Alcohol/week: 0.0 - 1.0 standard drinks      Comment: ZINFANDEL GLASS OF WINE OCCASSIONALLY     Drug use: No      Allergies:  @ALLERGY    Laboratory Data:  Recent Labs   Lab 12/21/20  1634 12/21/20 ulcer and Stage III to sacral/R gluteal region. R heel:  (5.3cm x 4.4cm) with 100% black eschar, dry and stable.     Stage III to sacral and R gluteal region: (6.0cm x 6.5cm x 0.3cm) with exposed subcutaneous tissue, no bone, no muscle, 50/50% red to yel

## 2020-12-24 NOTE — OCCUPATIONAL THERAPY NOTE
Order received for OT eval per functional mobility screen. Pt currently intubated and not medically appropriate for therapy at this time. Will place patient ON HOLD.  Please re-order therapy, as appropriate

## 2020-12-24 NOTE — RESPIRATORY THERAPY NOTE
Pt extubated to vapotherm 40 L 100%. Pt tolerated well. Also QID CPT started due to copious secretions.

## 2020-12-25 NOTE — PROGRESS NOTES
Patient desaturated on Vapotherm despite incresed to 100% Fio2. Placed patient on Bipap 100% with no improvement. Dr. Lashaun Barber informed for the above updates. Patient reintubated , post intubation chest xray ordered and placement was verified.    Patient now on p

## 2020-12-25 NOTE — PROGRESS NOTES
[facility] Hospitalist Progress Note     PCP: Ana Duff MD    CC:  Follow up    SUBJECTIVE:  Pt was extubated yesterday but reintubated this AM.  Saw pt at door to preserve PPE.   Pt intubated and sedated    OBJECTIVE:  Temp:  [97.4 °F (36.3 °C)-99.3 °F (3 181* 105*       Recent Labs   Lab 12/21/20  1634 12/22/20  0521 12/23/20  0521 12/24/20  0635 12/24/20  0907 12/25/20  0443   ALT 16 29 22 13 17 24   AST 30 65* 42* 20 30 45*   ALB 2.3* 1.8* 1.7* 1.1* 1.5* 1.7*   * 333* 340* 244  --  364*       Rece • norepinephrine 5 mcg/min (12/25/20 1234)   • vasopressin (PITRESSIN) infusion for shock       fentaNYL citrate **OR** fentaNYL citrate, acetaminophen **OR** acetaminophen **OR** acetaminophen, fentanyl, PEG 3350, magnesium hydroxide, bisacodyl, Fleet E chronic anemia- likely reactive, monitor     Preventative lovenox moderate dose         Thank You,  Marcos Conde MD    [facility] Hospitalist  Answering Service number: [phone]

## 2020-12-25 NOTE — PLAN OF CARE
No overnight events. Patient alert and oriented to self, forgetful. On Vapotherm 40L, 70 FIO2. Vital signs stable. Discussed with Dr. Kusum Vigil about drop in glucose, levemir dose change.  Discussed with Dr. Graciela Graf about positive urine and sputum cultures, patie

## 2020-12-25 NOTE — CONSULTS
[facility]  Report of  Surgical Consultation with History and Physical Exam    Coit Retort Patient Status:  Inpatient    3/24/1945 MRN [id number]   [facility] 4SW-A Attending Samreen [place], *   Hosp Day # 4 PCP Madonna Youssef fractional lengthening of biceps, brachialis, flexor carpi radialis, flexor carpi ulnaris, flexor pollicis longus and flexor digitorum superficialis & flexor digitorum profundus to the index, middle and ring finger     Family History   Problem Relation UNIT/ML flextouch 6 Units, 6 Units, Subcutaneous, Q12H Peds  •  [COMPLETED] vancomycin IVPB premix 1.5g in 0.9% NaCl 250 mL, 25 mg/kg, Intravenous, Once **FOLLOWED BY** Vancomycin HCl (VANCOCIN) 1,000 mg in sodium chloride 0.9% 250 mL IVPB add-vantage, 15 Q24H    Review of Systems:  Pertinent items are noted in HPI. Patient unable to give review of systems due to being on BiPAP. Physical Exam:  Blood pressure 130/52, pulse 86, temperature 99.3 °F (37.4 °C), temperature source Temporal, resp.  rate [place]cher[place] Sitter 16.0* 19.0* 22.0   ALKPHO 67 91 104   AST 20 30 45*   ALT 13 17 24   BILT 0.2 0.3 0.4   TP 3.7* 5.0* 5.6*         Recent Labs   Lab 12/21/20  1634   PTP 16.2*   INR 1.26*   PTT 56.3*         Impression:  Patient Active Problem List:     Multiple sclerosis PM

## 2020-12-25 NOTE — PROGRESS NOTES
Pt reintubated due to worsening hypoxemia. Post intubation film showed RLL infiltrate had increased. Suspect aspiration pneumonitis. Continue full vent support, 16/400/60/15. Once at Fio2 50%, can wean PEEP.

## 2020-12-25 NOTE — PROGRESS NOTES
[facility]    Patients Name: Irma Dunbar  Attending Physician: Jaime Pryor, *  CSN: [id number]   Location:  [address] [place] [address]  MRN: [id number]   YOB: 1945  Admission Date: 12/21/2020     Endotracheal Intubation Procedure Note

## 2020-12-25 NOTE — PROGRESS NOTES
Assumed care at 0700. Patient is alert oriented x 1-2. Calm and cooperative. Follow simple commands. arms/legs are contracted. Severe kyphosis, patient desaturated ( 83-85% ) on Vapotherm this morning, Bipap started 100% Fio2. Stat chest xray done.  Dr. Lashaun Barber a

## 2020-12-25 NOTE — PROGRESS NOTES
Received patient on VC+ 16/400/60%/+15. Weaned PEEP to +10, patient tolerated well. Breath sounds are bilaterally diminished. Suctioning small amounts of thick white secretions. Will continue to monitor.      12/25/20 5333   Vent Information   $ RT Standby

## 2020-12-25 NOTE — PROGRESS NOTES
[facility]  Progress Note    Marlena Hernandez Patient Status:  Inpatient    3/24/1945 MRN [id number]   [facility] 4SW-A Attending Yani Nicholson MD   Hosp Day # 4 PCP Ana Duff MD       Assessment/Plan:76 yo with complex medical hx Staphylococcus aureus (A)     Sputum Smear 2+ WBCs seen     Sputum Smear 2+ Gram Positive Cocci    ABG PANEL W ELECT AND LACTATE    Collection Time: 12/23/20 10:15 AM   Result Value Ref Range    ABG pH 7.42 7.35 - 7.45    ABG pCO2 28 (L) 35 - 45 mm Hg    A 177 (H) 70 - 99 mg/dL   C-REACTIVE PROTEIN    Collection Time: 12/24/20  6:35 AM   Result Value Ref Range    C-Reactive Protein 8.95 (H) <0.30 mg/dL   COMP METABOLIC PANEL (14)    Collection Time: 12/24/20  6:35 AM   Result Value Ref Range    Glucose 136 ( Slide Review Slide reviewed, manual differential added    MANUAL DIFFERENTIAL    Collection Time: 12/24/20  6:35 AM   Result Value Ref Range    Neutrophil Absolute Manual 5.73 1.50 - 7.70 x10(3) uL    Lymphocyte Absolute Manual 0.37 (L) 1.00 - 4.00 x10(3) pg    MCHC 31.5 31.0 - 37.0 g/dL    RDW 14.3 11.0 - 15.0 %    RDW-SD 50.5 (H) 35.1 - 46.3 fL    Neutrophil Absolute Prelim 6.23 1.50 - 7.70 x10 (3) uL   SCAN SLIDE    Collection Time: 12/24/20  9:07 AM   Result Value Ref Range    Slide Review Slide reviewe 10.1 mg/dL    Calculated Osmolality 296 (H) 275 - 295 mOsm/kg    GFR, Non- 87 >=60    GFR, -American 101 >=60    AST 45 (H) 15 - 37 U/L    ALT 24 13 - 56 U/L    Alkaline Phosphatase 104 55 - 142 U/L    Bilirubin, Total 0.4 0.1 - 2.0 reviewed, normal WBC, RBC, and platelet morphology.    POCT GLUCOSE    Collection Time: 12/25/20  6:05 AM   Result Value Ref Range    POC Glucose 101 (H) 70 - 99 mg/dL        Lyndsay VasaMD FACE

## 2020-12-26 NOTE — PROGRESS NOTES
12/26/20 1125   Clinical Encounter Type   Visited With Patient not available   Routine Visit Introduction  (Lacie Trinidad left filled out POLST on patient chart for signatures when appropriate,  available at 2000 if support is needed)   Continue Visi

## 2020-12-26 NOTE — PROGRESS NOTES
[facility]  Progress Note    Emil Miranda Patient Status:  Inpatient    3/24/1945 MRN [id number]   [facility] 4SW-A Attending Kathrin Avila MD   Hosp Day # 5 PCP Lolis Montenegro MD       Assessment/Plan:76 yo with complex medical hx Date    A1C 6.9 (A) 09/23/2020    A1C 6.6 (A) 10/24/2019    A1C 6.5 (A) 06/04/2019      Recent Results (from the past 48 hour(s))   COMP METABOLIC PANEL (14)    Collection Time: 12/24/20  9:07 AM   Result Value Ref Range    Glucose 181 (H) 70 - 99 mg/dL Lymphocyte % Manual 7 %    Monocyte % Manual 3 %    Eosinophil % Manual 0 %    Basophil % Manual 0 %    Total Cells Counted 100     RBC Morphology Normal Normal, Slide reviewed, see previous RBC morphology.     Platelet Morphology Normal Normal   PHOSPHO (H) 84 - 246 U/L   MAGNESIUM    Collection Time: 12/25/20  4:43 AM   Result Value Ref Range    Magnesium 2.0 1.6 - 2.6 mg/dL   PHOSPHORUS    Collection Time: 12/25/20  4:43 AM   Result Value Ref Range    Phosphorus 1.2 (L) 2.5 - 4.9 mg/dL   CBC W/ DIFFEREN Calculated O2 Saturation 100 92 - 100 %    Patient Temperature 98.6 F    Total Hemoglobin 6.2 (LL) 11.7 - 16.0 g/dL    Carboxyhemoglobin 0.6 0.0 - 3.0 % SAT    Methemoglobin 0.4 0.4 - 1.5 % SAT    Ionized Calcium 1.13 1.12 - 1.32 mmol/L    Allens Test P mmol/L    Potassium 4.2 3.5 - 5.1 mmol/L    Chloride 112 98 - 112 mmol/L    CO2 22.0 21.0 - 32.0 mmol/L    Anion Gap 5 0 - 18 mmol/L    BUN 13 7 - 18 mg/dL    Creatinine 0.94 0.55 - 1.02 mg/dL    BUN/CREA Ratio 13.8 10.0 - 20.0    Calcium, Total 7.9 (L) 8. x10(3) uL    Neutrophil % 86.2 %    Lymphocyte % 8.5 %    Monocyte % 4.0 %    Eosinophil % 0.0 %    Basophil % 0.0 %    Immature Granulocyte % 1.3 %   SCAN SLIDE    Collection Time: 12/26/20  4:50 AM   Result Value Ref Range    Slide Review       Slide rev

## 2020-12-26 NOTE — PROGRESS NOTES
Received sedated with propofol at 0.8 mcg, SB as low as 39; Precedex weaned down to 0.2 mcg; pt is following simle commands. Able to squeeze hands when asked to. Contracted x 4 extremities.    Vented to ETT FIO2 60%, PEEP 10+; was weaned down to 50%, PEEP 5

## 2020-12-26 NOTE — PROGRESS NOTES
[facility] Hospitalist Progress Note     PCP: Mihai Harris MD    CC:  Follow up    SUBJECTIVE:   Saw pt at door to preserve PPE.    Pt intubated and sedated    OBJECTIVE:  Temp:  [97.4 °F (36.3 °C)-98.3 °F (36.8 °C)] 97.9 °F (36.6 °C)  Pulse:  [44-75] 46  Res 13   CREATSERUM 1.13*   < > 0.80  --  0.50* 0.61 0.66 0.75  --  0.94   CA 9.8   < > 7.9*  --  5.6* 7.2* 7.9* 7.5*  --  7.9*   MG 2.1  --  1.9  --  1.4*  --  2.0  --   --  1.8   PHOS  --   --  2.5  --  0.9* 1.1* 1.2*  --   --  2.0*   GLU 89   < > 112*  -- zinc sulfate  220 mg Oral Daily   • Vitamin C  500 mg Oral Daily   • piperacillin-tazobactam  3.375 g Intravenous Q8H   • famoTIDine  20 mg Oral Nightly    Or   • famoTIDine  20 mg Intravenous Nightly   • dexamethasone Sodium Phosphate  6 mg Intravenous Da due to decreased BP during admit   hold tizanidine due to bradycardia and BP during admit     Bradycardia; possibly related to sedation  Pt is on telemetry  EKG on admit normal   pt was samra to 20s on 12/22/20  Cards on, appreciate: per cardiology no evid

## 2020-12-26 NOTE — PLAN OF CARE
At first assessment today the ET tube measured 25 cm from the lip. RT was notified of this finding and asked to check the ET tube as well as the tube perez which appeared to be pressing against the patients nose. ET tube and sight was assessed by RT.

## 2020-12-26 NOTE — RESPIRATORY THERAPY NOTE
Received patient on VC+ 16/400/60%/+15. FiO2 weaned down to 50%. Peep weaned down to +5. Diminished breath sounds bilaterally. Minimal secretions from ET tube. Will continue to monitor. 36- Called by Rn due to patient desating.  Patient awake and jose

## 2020-12-26 NOTE — SLP NOTE
Pt reintubated to vent; will hold and await new orders if/when appropriate.   Olu Mark MS CCC-SLP/L, pager [phone]  Speech-LanguagePathologist

## 2020-12-26 NOTE — PROGRESS NOTES
Jaja Burciaga Patient Status:  Inpatient    3/24/1945 MRN [id number]   [facility] 4SW-A Attending Alicia Frias, *   Hosp Day # 5 PCP Charlie Moon MD     Critical Care Progress Note      Assessment/Plan:  1.  Acute hypoxemic r 10 mg Oral Nightly   • Levothyroxine Sodium  75 mcg Oral QAM AC   • atorvastatin  20 mg Oral Nightly   • zinc sulfate  220 mg Oral Daily   • Vitamin C  500 mg Oral Daily   • piperacillin-tazobactam  3.375 g Intravenous Q8H   • famoTIDine  20 mg Oral Nightl 12/25/20  1238   ABGPHT 7.36   KBVNDY6B 37   ZDRPZ0A 233*   ABGHCO3 20.5*   ABGBE -4.5*   TEMP 98.6   RACHEL Positive   SITE Left Radial   DEV    THGB 6.2*     No results for input(s): BNP in the last 168 hours.   Recent Labs   Lab 12/21/20  1634   TROP

## 2020-12-26 NOTE — PROGRESS NOTES
Pharmacy Dosing Service  Follow-up Pharmacokinetic Consult for Vancomycin Dosing          Edna Real is a 77year old female who is being treated for sepsis. Vancomycin day #3      Goal trough is ~15 ug/mL. She is allergic to oxybutynin.

## 2020-12-27 NOTE — PROGRESS NOTES
[facility] hospitalist daily note      S; intubated    Medications in Epic    /39, pulse 77, O2 sat 98%  Gen intubate  HEENT ET tube in place, anicteric  CV regular on the monitor  Pulm vent  Psych not agigated      Labs  K 3.3  WBC 8 Hg 8.4 Plt 321    Asse

## 2020-12-27 NOTE — PLAN OF CARE
Upon initial assessment, pt resting in bed intubated, propofol and fentanyl infusing to maintain compliance w/the vent. Pt follows commands, LUE contracted, splint in place. OG is in place w/continuous tf, garza intact and draining adequately.  Lytes replac

## 2020-12-27 NOTE — PROGRESS NOTES
Monica Mclain Patient Status:  Inpatient    3/24/1945 MRN [id number]   [facility] 4SW-A Attending Chavo Strauss, *   Hosp Day # 6 PCP Yaima Myers MD     Critical Care Progress Note      Assessment/Plan:  1.  Acute hypoxemic r • Levothyroxine Sodium  75 mcg Oral QAM AC   • atorvastatin  20 mg Oral Nightly   • zinc sulfate  220 mg Oral Daily   • Vitamin C  500 mg Oral Daily   • piperacillin-tazobactam  3.375 g Intravenous Q8H   • famoTIDine  20 mg Oral Nightly    Or   • famoTID 22  --    BILT 0.3 0.4  --   --  0.4  --    TP 5.0* 5.6*  --   --  5.2*  --      Recent Labs   Lab 12/25/20  1238   ABGPHT 7.36   [id number] 37   EYJCA8A 233*   ABGHCO3 20.5*   ABGBE -4.5*   TEMP 98.6   RACHEL Positive   SITE Left Radial   DEV    THGB 6

## 2020-12-27 NOTE — PROGRESS NOTES
Pharmacy Note:  Renal Dose Adjustment         Ines Doss has been prescribed famotidine 20mg  IV/PO  q24hr.     Est CrCl: >60 mL/min    Est CrCl > 50 mL/min, therefore the dose of famotidine has been changed to 20mg  IV/PO  q12hr per P&T approved pr

## 2020-12-27 NOTE — PROGRESS NOTES
Received patient on vent settings noted below. Weaning O2 as tolerated.        12/27/20 0402   Vent Information   $ RT Standby Charge (per 15 min) 1  (vent check)   Ventilator Initiation 12/25/20   Ventilation Day(s) 3   Interface Invasive   Vent Type

## 2020-12-27 NOTE — PLAN OF CARE
Received pt desaturating mid 80's increase cough and secretions. RT at bedside. Suction done, peep increased to +15 at that time. Fentanyl bolus given and drip increased, Precedex increased as well.  Updated APN Marco Ion switched to Propofol d/t HR 4

## 2020-12-27 NOTE — PROGRESS NOTES
[facility]  Progress Note    Marlena Hernandez Patient Status:  Inpatient    3/24/1945 MRN [id number]   [facility] 4SW-A Attending Yani Nicholson MD   Hosp Day # 6 PCP Ana Duff MD       Assessment/Plan:74 yo with complex medical hx 212* 191*     HGBA1C:    Lab Results   Component Value Date    A1C 6.9 (A) 09/23/2020    A1C 6.6 (A) 10/24/2019    A1C 6.5 (A) 06/04/2019      Recent Results (from the past 48 hour(s))   POCT GLUCOSE    Collection Time: 12/25/20 12:25 PM   Result Value Ref GLUCOSE    Collection Time: 12/25/20  5:35 PM   Result Value Ref Range    POC Glucose 57 (L) 70 - 99 mg/dL   POCT GLUCOSE    Collection Time: 12/25/20  6:12 PM   Result Value Ref Range    POC Glucose 207 (H) 70 - 99 mg/dL   POTASSIUM    Collection Time: 12 Collection Time: 12/26/20  4:50 AM   Result Value Ref Range    WBC 5.5 4.0 - 11.0 x10(3) uL    RBC 2.79 (L) 3.80 - 5.30 x10(6)uL    HGB 8.4 (L) 12.0 - 16.0 g/dL    HCT 25.6 (L) 35.0 - 48.0 %    .0 150.0 - 450.0 10(3)uL    MCV 91.8 80.0 - 100.0 fL Collection Time: 12/27/20  4:10 AM   Result Value Ref Range    Magnesium 2.1 1.6 - 2.6 mg/dL   D-DIMER    Collection Time: 12/27/20  4:10 AM   Result Value Ref Range    D-Dimer 3.97 (H) <0.74 ug/mL FEU   CBC W/ DIFFERENTIAL    Collection Time: 12/27/20  4: Range     (H) 84 - 246 U/L   SCAN SLIDE    Collection Time: 12/27/20  4:10 AM   Result Value Ref Range    Slide Review       Slide reviewed, normal WBC, RBC, and platelet morphology.         [address] [facility]

## 2020-12-28 NOTE — PROGRESS NOTES
[facility] Hospitalist Progress Note     PCP: Pa Dugan MD    CC:  Follow up    SUBJECTIVE:   Saw pt at door to preserve PPE.    Pt intubated and sedated    OBJECTIVE:  Temp:  [97.4 °F (36.3 °C)-98.7 °F (37.1 °C)] 97.4 °F (36.3 °C)  Pulse:  [57-86] 59  R 12/28/20  0350   *  --  148* 145 142 141  --  139 136 134*   K 3.1*   < > 3.1* 4.0 3.5 2.9* 4.4 4.2 3.3* 3.6   *  --  128* 119* 116* 114*  --  112 108 107   CO2 20.0*  --  16.0* 19.0* 22.0 23.0  --  22.0 23.0 21.0   BUN 24*  --  16 20* 16 13  - Per NG Tube BID   • cholecalciferol  2,000 Units Oral Daily   • docusate sodium  100 mg Oral BID   • Chlorhexidine Gluconate  15 mL Mouth/Throat [email or web address]   • enoxaparin  0.6 mg/kg Subcutaneous Q12H [address]   • Calcium Carbonate-Vitamin D  2 tablet Oral BI pneumonia, possible UTI  -BC and UC reviewed. BC on admit likely contaminant   -ID on consult, appreciate  -IV zosyn, vancomycin     IDDM follows with endocrine as outpatient   Endocrine consult, appreciate.  Insulin adjustments prn     HTN, HL BP is not el

## 2020-12-28 NOTE — PROGRESS NOTES
120 [facility] dosing service    Follow-up Pharmacokinetic Consult for Vancomycin Dosing     Addy Li is a 77year old patient who is being treated for sepsis. Patient is on day 5 of Vancomycin and is currently receiving 1.25 gm IV Q 12 hours.   [place] care of this patient.     FRED Marks [facility]  12/28/2020  5:11 AM  [address] [place] [address] Extension: [phone]

## 2020-12-28 NOTE — DIETARY NOTE
[facility] UP ASSESSMENT    Pt does not meet malnutrition criteria at this time.     NUTRITION DIAGNOSIS/PROBLEM:  Inadequate energy intake related to decreased ability to consume sufficient energy intake as evidenced by current vent/ significant wt changes per EMR hx.     Patient Weight for the past 72 hrs:   Weight   12/21/20 1613 57 kg (125 lb 10.6 oz)   12/21/20 2112 57 kg (125 lb 10.6 oz)     Wt Readings from Last 10 Encounters:  12/28/20 : 67.1 kg (147 lb 14.9 oz)  11/05/20 : 57 kg

## 2020-12-28 NOTE — PALLIATIVE CARE NOTE
I called Caridad Khan to follow up from palliative care. His son Petros Lozano was also on the speaker phone. Caridad Khan replied I had stated earlier I had not made up my mind about palliative care following. Do you work for the hospital or outside?  I answered his questio

## 2020-12-28 NOTE — PROGRESS NOTES
[facility] PULMONARY/CRITICAL CARE    S: Pt remains intubated, sedated but arousable.     Meds:  • potassium chloride  40 mEq Intravenous Once   • insulin detemir  5 Units Subcutaneous Q12H [facility]   • famoTIDine  20 mg Oral BID    Or   • famoTIDine  20 mg Intravenous - CTAB  CV - regular rate & rhythm. Normal S1, S2. No murmurs, rubs, or gallops appreciated. Abdomen - soft, nontender to palpation   Extremities - No cyanosis, clubbing, edema appreciated.         Labs:  Recent Labs   Lab 12/26/20  0450 12/27/20  0410 12/ PCR+ 12/17. Sputum + MSSA. Urine culture + aerococcus  - IV decadron 12/21-  - Remdesivir completed  - not a candidate for actemra given concern for bacterial infection   - Vanc/ zosyn per ID.  S/p azithromycin.   - D-dimer elevated -- continue LMWH moderat

## 2020-12-28 NOTE — COVID NURSING ASSESSMENT
Assumed care at [phone]. Patient stable on ventilator. Fentanyl and Propofol infusing. Hemodynamically stable. Am Hgb decreased from yesterday. Repeat Hgb at 1200 showed additional decrease with level 6.6.  MD's aware. Transfusion ordered.   Palliative c

## 2020-12-28 NOTE — PROGRESS NOTES
[facility]  Progress Note    Robert Flores Patient Status:  Inpatient    3/24/1945 MRN [id number]   [facility] 4SW-A Attending Rafal Ghosh MD   Hosp Day # 7 PCP Chiquita Wang MD       Assessment/Plan:76 yo with complex medical hx 12/27/20  1421 12/27/20  1949 12/28/20  0145   PGLU 191* 203* 276* 368* 380*     HGBA1C:    Lab Results   Component Value Date    A1C 6.9 (A) 09/23/2020    A1C 6.6 (A) 10/24/2019    A1C 6.5 (A) 06/04/2019      Recent Results (from the past 48 hour(s))   VA x10(3) uL    Immature Granulocyte Absolute 0.08 0.00 - 1.00 x10(3) uL    Neutrophil % 89.0 %    Lymphocyte % 6.4 %    Monocyte % 2.5 %    Eosinophil % 0.8 %    Basophil % 0.3 %    Immature Granulocyte % 1.0 %   BASIC METABOLIC PANEL (8)    Collection Time: 1.02 mg/dL    GFR, Non- 71 >=60    GFR, -American 82 >=60   C-REACTIVE PROTEIN    Collection Time: 12/28/20  3:50 AM   Result Value Ref Range    C-Reactive Protein 20.90 (H) <0.30 mg/dL   FERRITIN    Collection Time: 12/28/20  3:50 A

## 2020-12-28 NOTE — PLAN OF CARE
Received patient at [address] [place] [address]. Pt sedated on ventilator for ventilator compliance with fentanyl and propofol. Pt able to open eyes to voice and track with eyes, but not able to follow commands. Pt grimaces with repositioning.  Pupils equal and reactive, cough an

## 2020-12-28 NOTE — COVID NURSING ASSESSMENT
Assumed care at [phone]. Patient stable on ventilator. Fentanyl and Propofol infusing. Hemodynamically stable. Am Hgb decreased from yesterday. Repeat Hgb at 1200 showed additional decrease with level 6.6.  MD's aware. Transfusion ordered.   Palliative c

## 2020-12-29 NOTE — PROGRESS NOTES
[facility]                INFECTIOUS DISEASE PROGRESS NOTE    Amy Arteaga Patient Status:  Inpatient    3/24/1945 MRN [id number]   [facility] 4SW-A Attending Elva Rolle MD   [facility] Day # 8 PCP Sarahy Park MD     Ant 0. 78  0.78   GFRAA 101   < > 69 100  100 82  82 87  87   GFRNAA 87   < > 60 86  86 71  71 75  75   CA 7.9*   < > 7.9* 7.2* 7.4* 7.7*   ALB 1.7*  --  1.5*  --   --  1.4*      < > 139 136 134* 137   K 3.5   < > 4.2 3.3* 3.6 4.1   *   < > 112 108 Sepsis due to urinary tract infection ([facility] [address]     Controlled type 2 diabetes mellitus with complication, without long-term current use of insulin [facility])     Palliative care encounter     Counseling regarding advance care planning and goals of care        ASSE

## 2020-12-29 NOTE — PROGRESS NOTES
[facility] PULMONARY/CRITICAL CARE    S: Pt remains intubated, sedated but arousable.     Meds:  • potassium & sodium phosphates  1 packet Oral TID CC   • vancomycin  1,250 mg Intravenous See Admin Instructions (RX holding)   • insulin detemir  6 Units Subcutaneou H20] 5 cm H20  MAP (cm H2O):  [10-18] 10       Gen - intubated, wakes up to stimuli  Lungs - CTAB  CV - regular rate & rhythm. Normal S1, S2. No murmurs, rubs, or gallops appreciated. Abdomen - soft, nontender to palpation   Extremities - + edema RUE.  LUE superimposed bacterial pneumonia. COVID PCR+ 12/17. Sputum + MSSA. Urine culture + aerococcus  - IV decadron (12/21-)  - Remdesivir completed  - not a candidate for actemra given concern for bacterial infection   - continue Vanc/ zosyn per ID.  S/p azithrom

## 2020-12-29 NOTE — PROGRESS NOTES
[facility]  Progress Note    Reny Arrow Patient Status:  Inpatient    3/24/1945 MRN [id number]   [facility] 4SW-A Attending Makayla Morgan MD   Hosp Day # 8 PCP Lorrene Duane, MD       Assessment/Plan:76 yo with complex medical hx Labs:  Recent Labs   Lab 12/28/20  1136 12/28/20  1717 12/28/20 2005 12/28/20 2006 12/29/20  0219   PGLU 369* 286* 348* 352* 308*     HGBA1C:    Lab Results   Component Value Date    A1C 6.9 (A) 09/23/2020    A1C 6.6 (A) 10/24/2019    A1C 6.5 (A) 0 (H) 70 - 99 mg/dL    Sodium 134 (L) 136 - 145 mmol/L    Potassium 3.6 3.5 - 5.1 mmol/L    Chloride 107 98 - 112 mmol/L    CO2 21.0 21.0 - 32.0 mmol/L    Anion Gap 6 0 - 18 mmol/L    BUN 12 7 - 18 mg/dL    Creatinine 0.82 0.55 - 1.02 mg/dL    BUN/CREA Ratio g/dL   POCT GLUCOSE    Collection Time: 12/29/20  2:19 AM   Result Value Ref Range    POC Glucose 308 (H) 70 - 99 mg/dL   CREATININE, SERUM    Collection Time: 12/29/20  4:31 AM   Result Value Ref Range    Creatinine 0.78 0.55 - 1.02 mg/dL    GFR, Non-Afri 12/29/20  4:31 AM   Result Value Ref Range    PT 16.1 (H) 12.4 - 14.6 seconds    INR 1.25 (H) 0.89 - 1.11   CBC W/ DIFFERENTIAL    Collection Time: 12/29/20  4:31 AM   Result Value Ref Range    WBC 10.2 4.0 - 11.0 x10(3) uL    RBC 3.01 (L) 3.80 - 5.30 x10(

## 2020-12-29 NOTE — PLAN OF CARE
Assumed pt care at 0730. Pt in bed resting quietly on ventilator. No s/s of acute distress noted at this time. VSS. Pt does not appear to be in pain at this time, will monitor.  Pt opens eyes to voice, tracks your position, and will follow com

## 2020-12-29 NOTE — RESPIRATORY THERAPY NOTE
Received patient on VC+16/400-60%/+10. Patient sating in high 90's throughout the night. Peep weaned down to +5 overnight. Will continue to monitor.

## 2020-12-29 NOTE — PROGRESS NOTES
[facility] Hospitalist Progress Note     PCP: Marcelino Ramirez MD    CC:  Follow up    SUBJECTIVE:   Saw pt at door to preserve PPE.    Pt intubated and sedated    OBJECTIVE:  Temp:  [96.9 °F (36.1 °C)-98.1 °F (36.7 °C)] 97.6 °F (36.4 °C)  Pulse:  [47-78] 67  R 142 141  --  139 136 134* 137   K 3.1* 4.0 3.5 2.9* 4.4 4.2 3.3* 3.6 4.1   * 119* 116* 114*  --  112 108 107 110   CO2 16.0* 19.0* 22.0 23.0  --  22.0 23.0 21.0 24.0   BUN 16 20* 16 13  --  13 12 12 11   CREATSERUM 0.50* 0.61 0.66 0.75  --  0.94 0.68 docusate sodium  100 mg Oral BID   • Chlorhexidine Gluconate  15 mL Mouth/Throat [email or web address]   • enoxaparin  0.6 mg/kg Subcutaneous Q12H [address]   • Calcium Carbonate-Vitamin D  2 tablet Oral BID   • Donepezil HCl  10 mg Oral Nightly   • Levothyroxine Sodium pressors), sepsis secondary to above, covid pna, possible bacterial pneumonia, possible UTI  -BC and UC reviewed.  BC on admit likely contaminant   -ID on consult, appreciate  -IV zosyn, vancomycin     IDDM follows with endocrine as outpatient   Endocrine c

## 2020-12-29 NOTE — PLAN OF CARE
Received pt consent verified, PRBC that was ordered at 1424 given. Completed transfusion without s/s of reaction noted repeat hgb 8.6, Pt taken for CT at 2100 that was ordered 1428 without incident. Resulted reported to MARY BETH Garcia.  Pt continues on Propofol a

## 2020-12-29 NOTE — PROGRESS NOTES
Palliative Care   Call from nursing staff with request from son Kevin Vera to reach out to arrange meeting with him, his dad and sister. Call to Kevin Vera to discuss request and noting previous discussions with his dad declining further input from palliative care.  Camille Mattson

## 2020-12-30 NOTE — PROGRESS NOTES
[address]  [id number]  Patient seen at: [facility] Day #9     This patient is COVID-19+ .  I spoke with the Pulmonology, for purposes of responsible PPE use in this time of PPE sh Intravenous, BID  •  Insulin Aspart Pen (NOVOLOG) 100 UNIT/ML flexpen 1-20 Units, 1-20 Units, Subcutaneous, Q6H  •  gabapentin (NEURONTIN) 250 MG/5ML solution 100 mg, 100 mg, Per NG Tube, BID  •  ondansetron HCl (ZOFRAN) injection 4 mg, 4 mg, Intravenous, (LOVENOX) 30 MG/0.3ML injection 30 mg, 0.6 mg/kg, Subcutaneous, Q12H [address]  •  Calcium Carbonate-Vitamin D 250-125 MG-UNIT per tab TABS 2 tablet, 2 tablet, Oral, BID  •  Donepezil HCl (ARICEPT) tab 10 mg, 10 mg, Oral, Nightly  •  Levothyroxine Sodium tab 75 intravenous contrast material. Multi-planar reformatted/3-D images were created to optimize visualization of vascular anatomy. Dose reduction techniques   were used.  Dose information is transmitted to the [facility]) [address] no definite acute abnormality      OTHER:                  Body wall edema in the lower abdomen and pelvis with stranding of the subcutaneous fat from edema bilateral..                       Impression: CONCLUSION:  CTA shows no sign of arterial aneurysm, any work  coma  Max Assist  Total Care Mouth care Drowsy or coma   0 Death       Psychosocial: Emotional support provided to family by phone.        Goals of Care:     Phone conference with spouse Loulou Gao, son Rey Giraldo and daughter Dell Gutierrez per their request to kwame Spiritual service following [].     Disposition: ongoing goals of care discussions pending clinical course    Problem List   Patient Active Problem List:     Multiple sclerosis ([facility] [address]     Wheelchair bound     Hypothyroidism     Type 1 diabetes mellitus with 4. Healthcare POA: Healthcare Agent Appointed: Yes spouse Vinnie Rodriguez, document on file   5. Disposition: ongoing goals of care discussions    A total of 35 minutes were spent on this visit; >50% was spent counseling and coordinating care.      Thank you for inv

## 2020-12-30 NOTE — PROGRESS NOTES
[facility]  Progress Note    Ana Sheldon Patient Status:  Inpatient    3/24/1945 MRN [id number]   [facility] 4SW-A Attending Stephen Young MD   [facility] Day # 9 PCP Tha Strauss MD       Assessment/Plan:74 yo with complex medical hx Labs:  Recent Labs   Lab 12/29/20  0901 12/29/20  1351 12/29/20  1654 12/29/20 2012 12/30/20  0150   PGLU 227* 268* 305* 305* 264*     HGBA1C:    Lab Results   Component Value Date    A1C 6.9 (A) 09/23/2020    A1C 6.6 (A) 10/24/2019    A1C 6.5 (A) 0 Result Value Ref Range    C-Reactive Protein 9.94 (H) <0.30 mg/dL   FERRITIN    Collection Time: 12/29/20  4:31 AM   Result Value Ref Range    Ferritin 157.4 18.0 - 340.0 ng/mL   LDH    Collection Time: 12/29/20  4:31 AM   Result Value Ref Range    LDH 2 - 100.0 fL    MCH 30.6 26.0 - 34.0 pg    MCHC 32.7 31.0 - 37.0 g/dL    RDW 13.6 11.0 - 15.0 %    RDW-SD 47.0 (H) 35.1 - 46.3 fL    Neutrophil Absolute Prelim 9.31 (H) 1.50 - 7.70 x10 (3) uL    Neutrophil Absolute 9.31 (H) 1.50 - 7.70 x10(3) uL    Lymphocyt mOsm/kg    GFR, Non- 76 >=60    GFR, -American 88 >=60    AST 16 15 - 37 U/L    ALT 14 13 - 56 U/L    Alkaline Phosphatase 108 55 - 142 U/L    Bilirubin, Total 0.3 0.1 - 2.0 mg/dL    Total Protein 6.0 (L) 6.4 - 8.2 g/dL    Albumin 1.

## 2020-12-30 NOTE — PROGRESS NOTES
oMnica Mclain Patient Status:  Inpatient    3/24/1945 MRN [id number]   [facility] 4SW-A Attending Chavo Strauss, *   Hosp Day # 9 PCP Yaima Myers MD     Critical Care Progress Note      Assessment/Plan:  1.  Acute hypoxemic r gabapentin  100 mg Per NG Tube BID   • cholecalciferol  2,000 Units Oral Daily   • docusate sodium  100 mg Oral BID   • Chlorhexidine Gluconate  15 mL Mouth/Throat [email or web address]   • enoxaparin  0.6 mg/kg Subcutaneous Q12H [facility]   • Calcium Carbonate-Vitamin 3.6 4.1 3.8      < > 107 110 105   CO2 22.0   < > 21.0 24.0 27.0   ALKPHO 108  --   --  95 108   AST 29  --   --  17 16   ALT 22  --   --  12* 14   BILT 0.4  --   --  0.3 0.3   TP 5.2*  --   --  5.5* 6.0*    < > = values in this interval not displaye

## 2020-12-30 NOTE — PLAN OF CARE
Received patient from dayshift RN. Patient sedated, on fentanyl and propofol. Does not follow commands, opens eyes to voice and BLE & RUE to pain. LUE contracted. PERRLA. At shift change, on ventilator 70% FiO2 5+. Desaturates with turns.  Titrated to meet

## 2020-12-30 NOTE — PROGRESS NOTES
Patient received on [facility] VC+ 16/400/70%+5. Saturations in the mid to low 90's. Will try to wean FiO2 as able. Routine care given. Will continue to monitor closely.        12/29/20 2006   Vent Information   $ RT Standby Charge (per 15 min) 1   Ventilator Initi

## 2020-12-30 NOTE — RESPIRATORY THERAPY NOTE
Pt remain on ventilator setting of RR 16  FiO2 100% Peep 5. Unable to wean FiO2 due to pt desaturating on pulsox. Breath sounds are clear diminish with minimal secretions.

## 2020-12-30 NOTE — DIETARY NOTE
[facility] UP ASSESSMENT    Pt does not meet malnutrition criteria at this time.     NUTRITION DIAGNOSIS/PROBLEM:  Inadequate energy intake related to decreased ability to consume sufficient energy intake as evidenced by current vent/ dementia. ANTHROPOMETRICS:  Ht: 172.7 cm (5' 8\")  Wt: 67.4 kg (148 lb 9.4 oz). This is 90% of IBW  BMI: Body mass index is 22.59 kg/m². IBW: 63.6 kg    WEIGHT HISTORY: Noted no significant wt changes per EMR hx.     Patient Weight for the past 72 hrs:

## 2020-12-30 NOTE — PROGRESS NOTES
[facility] Hospitalist Progress Note     PCP: Lolis Montenegro MD    CC:  Follow up    SUBJECTIVE:   Saw pt at door to preserve PPE.    Pt intubated and sedated    OBJECTIVE:  Temp:  [97.7 °F (36.5 °C)-98.5 °F (36.9 °C)] 98.5 °F (36.9 °C)  Pulse:  [56-94] 93  R 134* 137 136   K 4.0 3.5   < > 4.2 3.3* 3.6 4.1 3.8   * 116*   < > 112 108 107 110 105   CO2 19.0* 22.0   < > 22.0 23.0 21.0 24.0 27.0   BUN 20* 16   < > 13 12 12 11 11   CREATSERUM 0.61 0.66   < > 0.94 0.68  0.68 0.82  0.82 0.78  0.78 0.77   CA 7.2* enoxaparin  0.6 mg/kg Subcutaneous Q12H [facility]   • Calcium Carbonate-Vitamin D  2 tablet Oral BID   • Donepezil HCl  10 mg Oral Nightly   • Levothyroxine Sodium  75 mcg Oral QAM AC   • atorvastatin  20 mg Oral Nightly   • zinc sulfate  220 mg Oral Daily   • V admit likely contaminant   -ID on consult, appreciate  -IV zosyn, vancomycin     IDDM follows with endocrine as outpatient   Endocrine consult, appreciate.  Insulin adjustments prn     HTN, HL BP is not elevated     MS, Dementia hold baclofen due to decreas

## 2020-12-31 NOTE — RESPIRATORY THERAPY NOTE
Received pt vented, VC+ 16/400/70%/+5, tolerating well. FiO2 increased to 100%. Suctioning small amount of thick clear/white secretions from ETT. Will continue to monitor closely.

## 2020-12-31 NOTE — CM/SW NOTE
Pt remains on vent. Updates sent to [facility]. Palliative Care is following.     Jez Cadena LCSW  /Discharge Planner  [phone]

## 2020-12-31 NOTE — PLAN OF CARE
Assumed pt care at 0730. Pt in bed resting quietly on ventilator. No s/s of acute distress noted at this time. VSS. Pt does not appear to be in pain at this time, will monitor.  Pt opens her eyes to voice, tracks your position, and follows comma

## 2020-12-31 NOTE — PROGRESS NOTES
[facility] Hospitalist Progress Note     PCP: Kiya Magdaleno MD    CC:  Follow up    SUBJECTIVE:  Stable overnight    OBJECTIVE:  Temp:  [98.3 °F (36.8 °C)-100.2 °F (37.9 °C)] 100.2 °F (37.9 °C)  Pulse:  [54-96] 90  Resp:  [11-26] 13  BP: (112-149)/(43-76) 1 1.8 2. 1  --  2.3 2.3 2.2   PHOS 1.2*  --  2.0*  --   --  1.8* 2.1* 2.7   *   < > 292* 188* 379* 220* 183* 196*    < > = values in this interval not displayed.        Recent Labs   Lab 12/25/20  0443 12/26/20  0450 12/27/20  0410 12/28/20  0350 12/29/ sulfate  220 mg Oral Daily   • Vitamin C  500 mg Oral Daily   • piperacillin-tazobactam  3.375 g Intravenous Q8H     • propofol 50 mcg/kg/min (12/31/20 1303)   • fentanyl 75 mcg/hr (12/31/20 1200)   • dexmedetomidine Stopped (12/26/20 2226)   • norepinephr BP during admit   Hold tizanidine due to bradycardia and BP during admit     Bradycardia; possibly related to sedation  Pt is on telemetry  EKG on admit normal   pt was samra to 20s on 12/22/20  Cards on, appreciate: per cardiology no evidece of heart bloc

## 2020-12-31 NOTE — PROGRESS NOTES
[facility]                INFECTIOUS DISEASE PROGRESS NOTE    Prince Herndon Patient Status:  Inpatient    3/24/1945 MRN [id number]   [facility] 4SW-A Attending Mauri Drummond MD   Hosp Day # 10 PCP Maggy Hein MD     An Vancomycin Trough (ug/mL)   Date Value   12/28/2020 27.9 (H)     Microbiology    [facility] Encounter on 12/21/20   1.  BLOOD CULTURE     Status: None    Collection Time: 12/23/20 11:29 AM    Specimen: Bld,Picc Line; Blood   Result Value Ref Range    Bl MD  [facility]  [phone]

## 2020-12-31 NOTE — PROGRESS NOTES
[facility] PULMONARY/CRITICAL CARE    S: Pt remains intubated and on mechanical vent. Sedated.      Meds:  • Insulin Aspart Pen  1-20 Units Subcutaneous Q6H   • insulin detemir  6 Units Subcutaneous Q12H [facility]   • famoTIDine  20 mg Oral BID    Or   • famoTIDine  20 nontender to palpation   Extremities - + RUE edema, picc line in place        Labs:  Recent Labs   Lab 12/29/20  0431 12/30/20 0419 12/31/20  0430   WBC 10.2 13.6* 9.3   HGB 9.2* 8.9* 8.2*   .0 373.0 315.0     Recent Labs   Lab 12/29/20 0431 12/30 electrolytes prn   6. Prophylaxis  - LMWH   - H2RB  7. Dispo  - DNAR. Palliative following    - ICU, remains critical    Discussed w/ patient's . I discussed [address] with him, they do not want trach/peg but he is still hopeful that she will improve.  I e

## 2020-12-31 NOTE — PLAN OF CARE
Received intubated to MV at 80% FIO2  AC 16 PEEP +5, opens eyes to voice, makes eye contact but does not follow commands, left arm is contracted, right arm is swollen +3 pitting edema, left upper extremity and BLE with non pitting edema, abdomen is r

## 2020-12-31 NOTE — PROGRESS NOTES
Am care done at 0500H, repositioned, desaturated after turning her to side, FIO2 increased to 100% then titrated back when O2 sat was stable. Gave Fentanyl flushes as PRN and Propofol titrated while she was having SOB and desaturation.

## 2020-12-31 NOTE — PROGRESS NOTES
[facility]  Progress Note    Pita Olvera Patient Status:  Inpatient    3/24/1945 MRN [id number]   [facility] 4SW-A Attending Ignacio Gates MD   Hosp Day # 10 PCP Mihai Harris MD       Assessment/Plan:76 yo with complex medical h examining the patient at bedside but rather with chart review, and discussion with referring team and/or bedside nursing.        Labs:  Recent Labs   Lab 12/30/20  0150 12/30/20  0753 12/30/20  1405 12/30/20  2339 12/31/20  0540   PGLU 264* 165* 145* 236* 2 1.0 - 2.0   MAGNESIUM    Collection Time: 12/30/20  4:19 AM   Result Value Ref Range    Magnesium 2.3 1.6 - 2.6 mg/dL   PHOSPHORUS    Collection Time: 12/30/20  4:19 AM   Result Value Ref Range    Phosphorus 2.1 (L) 2.5 - 4.9 mg/dL   C-REACTIVE PROTEIN Sodium 137 136 - 145 mmol/L    Potassium 4.0 3.5 - 5.1 mmol/L    Chloride 105 98 - 112 mmol/L    CO2 30.0 21.0 - 32.0 mmol/L    Anion Gap 2 0 - 18 mmol/L    BUN 12 7 - 18 mg/dL    Creatinine 0.69 0.55 - 1.02 mg/dL    BUN/CREA Ratio 17.4 10.0 - 20.0    C Immature Granulocyte Absolute 0.08 0.00 - 1.00 x10(3) uL    Neutrophil % 84.7 %    Lymphocyte % 8.0 %    Monocyte % 5.3 %    Eosinophil % 0.9 %    Basophil % 0.2 %    Immature Granulocyte % 0.9 %   POCT GLUCOSE    Collection Time: 12/31/20  5:40 AM   Resul

## 2020-12-31 NOTE — PROGRESS NOTES
[facility]                INFECTIOUS DISEASE PROGRESS NOTE    Martina Ramírez Patient Status:  Inpatient    3/24/1945 MRN [id number]   [facility] 4SW-A Attending Graham Carvajal MD   [facility] Day # 9 PCP Kiya Magdaleno MD     Ant GFRAA 69   < > 82  82 87  87 88   GFRNAA 60   < > 71  71 75  75 76   CA 7.9*   < > 7.4* 7.7* 7.8*   ALB 1.5*  --   --  1.4* 1.6*      < > 134* 137 136   K 4.2   < > 3.6 4.1 3.8      < > 107 110 105   CO2 22.0   < > 21.0 24.0 27.0   ALKPHO 108 with complication, without long-term current use of insulin [facility])     Palliative care encounter     Counseling regarding advance care planning and goals of care        ASSESSMENT/PLAN:  1.  COVID-19 pneumonia  Respiratory failure, intubated 12/21, extubated

## 2020-12-31 NOTE — PLAN OF CARE
Pt remains vented (22/400/+5) and sedated on propofol and fentanyl. FiO2 increased from 70% to 80% to maintain sats >89%. Copious thick secretions suctioned from inline.  Pt arouseable, intermittently following commands but extremely weak and minimally inte

## 2021-01-01 ENCOUNTER — APPOINTMENT (OUTPATIENT)
Dept: GENERAL RADIOLOGY | Facility: HOSPITAL | Age: 76
DRG: 870 | End: 2021-01-01
Attending: INTERNAL MEDICINE
Payer: MEDICARE

## 2021-01-01 VITALS
WEIGHT: 132.69 LBS | HEIGHT: 68 IN | BODY MASS INDEX: 20.11 KG/M2 | SYSTOLIC BLOOD PRESSURE: 138 MMHG | TEMPERATURE: 100 F | DIASTOLIC BLOOD PRESSURE: 52 MMHG | OXYGEN SATURATION: 55 %

## 2021-01-01 LAB
ALBUMIN SERPL-MCNC: 1.2 G/DL (ref 3.4–5)
ALBUMIN SERPL-MCNC: 1.2 G/DL (ref 3.4–5)
ALBUMIN SERPL-MCNC: 1.3 G/DL (ref 3.4–5)
ALBUMIN SERPL-MCNC: 1.4 G/DL (ref 3.4–5)
ALBUMIN SERPL-MCNC: 1.4 G/DL (ref 3.4–5)
ALBUMIN SERPL-MCNC: 1.5 G/DL (ref 3.4–5)
ALBUMIN/GLOB SERPL: 0.2 {RATIO} (ref 1–2)
ALBUMIN/GLOB SERPL: 0.3 {RATIO} (ref 1–2)
ALLENS TEST: POSITIVE
ALLENS TEST: POSITIVE
ALP LIVER SERPL-CCNC: 73 U/L
ALP LIVER SERPL-CCNC: 76 U/L
ALP LIVER SERPL-CCNC: 79 U/L
ALP LIVER SERPL-CCNC: 80 U/L
ALP LIVER SERPL-CCNC: 83 U/L
ALP LIVER SERPL-CCNC: 85 U/L
ALP LIVER SERPL-CCNC: 86 U/L
ALP LIVER SERPL-CCNC: 86 U/L
ALT SERPL-CCNC: 10 U/L
ALT SERPL-CCNC: 8 U/L
ALT SERPL-CCNC: 9 U/L
ANION GAP SERPL CALC-SCNC: 1 MMOL/L (ref 0–18)
ANION GAP SERPL CALC-SCNC: 2 MMOL/L (ref 0–18)
ANION GAP SERPL CALC-SCNC: 2 MMOL/L (ref 0–18)
ANION GAP SERPL CALC-SCNC: 3 MMOL/L (ref 0–18)
ANION GAP SERPL CALC-SCNC: 3 MMOL/L (ref 0–18)
ANION GAP SERPL CALC-SCNC: 4 MMOL/L (ref 0–18)
ANION GAP SERPL CALC-SCNC: 5 MMOL/L (ref 0–18)
ANION GAP SERPL CALC-SCNC: 5 MMOL/L (ref 0–18)
ARTERIAL BLD GAS O2 SATURATION: 95 % (ref 92–100)
ARTERIAL BLD GAS O2 SATURATION: 96 % (ref 92–100)
ARTERIAL BLOOD GAS BASE EXCESS: 11.1 MMOL/L (ref ?–2)
ARTERIAL BLOOD GAS BASE EXCESS: 7.1 MMOL/L (ref ?–2)
ARTERIAL BLOOD GAS HCO3: 31.8 MEQ/L (ref 22–26)
ARTERIAL BLOOD GAS HCO3: 36.6 MEQ/L (ref 22–26)
ARTERIAL BLOOD GAS PCO2: 46 MM HG (ref 35–45)
ARTERIAL BLOOD GAS PCO2: 58 MM HG (ref 35–45)
ARTERIAL BLOOD GAS PH: 7.42 (ref 7.35–7.45)
ARTERIAL BLOOD GAS PH: 7.46 (ref 7.35–7.45)
ARTERIAL BLOOD GAS PO2: 69 MM HG (ref 80–105)
ARTERIAL BLOOD GAS PO2: 76 MM HG (ref 80–105)
AST SERPL-CCNC: 11 U/L (ref 15–37)
AST SERPL-CCNC: 12 U/L (ref 15–37)
AST SERPL-CCNC: 15 U/L (ref 15–37)
AST SERPL-CCNC: 17 U/L (ref 15–37)
AST SERPL-CCNC: 18 U/L (ref 15–37)
AST SERPL-CCNC: 9 U/L (ref 15–37)
BASOPHILS # BLD AUTO: 0.01 X10(3) UL (ref 0–0.2)
BASOPHILS # BLD AUTO: 0.02 X10(3) UL (ref 0–0.2)
BASOPHILS # BLD AUTO: 0.03 X10(3) UL (ref 0–0.2)
BASOPHILS # BLD AUTO: 0.04 X10(3) UL (ref 0–0.2)
BASOPHILS # BLD AUTO: 0.04 X10(3) UL (ref 0–0.2)
BASOPHILS NFR BLD AUTO: 0.1 %
BASOPHILS NFR BLD AUTO: 0.2 %
BASOPHILS NFR BLD AUTO: 0.3 %
BASOPHILS NFR BLD AUTO: 0.4 %
BASOPHILS NFR BLD AUTO: 0.5 %
BILIRUB SERPL-MCNC: 0.2 MG/DL (ref 0.1–2)
BILIRUB SERPL-MCNC: 0.3 MG/DL (ref 0.1–2)
BILIRUB SERPL-MCNC: 0.4 MG/DL (ref 0.1–2)
BILIRUB SERPL-MCNC: 0.4 MG/DL (ref 0.1–2)
BUN BLD-MCNC: 11 MG/DL (ref 7–18)
BUN BLD-MCNC: 12 MG/DL (ref 7–18)
BUN BLD-MCNC: 13 MG/DL (ref 7–18)
BUN BLD-MCNC: 13 MG/DL (ref 7–18)
BUN BLD-MCNC: 14 MG/DL (ref 7–18)
BUN BLD-MCNC: 14 MG/DL (ref 7–18)
BUN BLD-MCNC: 20 MG/DL (ref 7–18)
BUN BLD-MCNC: 27 MG/DL (ref 7–18)
BUN/CREAT SERPL: 16.4 (ref 10–20)
BUN/CREAT SERPL: 17.1 (ref 10–20)
BUN/CREAT SERPL: 19.4 (ref 10–20)
BUN/CREAT SERPL: 21 (ref 10–20)
BUN/CREAT SERPL: 23 (ref 10–20)
BUN/CREAT SERPL: 23 (ref 10–20)
BUN/CREAT SERPL: 32.8 (ref 10–20)
BUN/CREAT SERPL: 36 (ref 10–20)
CALCIUM BLD-MCNC: 8 MG/DL (ref 8.5–10.1)
CALCIUM BLD-MCNC: 8 MG/DL (ref 8.5–10.1)
CALCIUM BLD-MCNC: 8.1 MG/DL (ref 8.5–10.1)
CALCIUM BLD-MCNC: 8.1 MG/DL (ref 8.5–10.1)
CALCIUM BLD-MCNC: 8.3 MG/DL (ref 8.5–10.1)
CALCIUM BLD-MCNC: 8.6 MG/DL (ref 8.5–10.1)
CALCIUM BLD-MCNC: 8.6 MG/DL (ref 8.5–10.1)
CALCIUM BLD-MCNC: 8.8 MG/DL (ref 8.5–10.1)
CALCULATED O2 SATURATION: 94 % (ref 92–100)
CALCULATED O2 SATURATION: 96 % (ref 92–100)
CARBOXYHEMOGLOBIN: 0.8 % SAT (ref 0–3)
CARBOXYHEMOGLOBIN: 1 % SAT (ref 0–3)
CHLORIDE SERPL-SCNC: 100 MMOL/L (ref 98–112)
CHLORIDE SERPL-SCNC: 103 MMOL/L (ref 98–112)
CHLORIDE SERPL-SCNC: 103 MMOL/L (ref 98–112)
CHLORIDE SERPL-SCNC: 96 MMOL/L (ref 98–112)
CHLORIDE SERPL-SCNC: 96 MMOL/L (ref 98–112)
CHLORIDE SERPL-SCNC: 97 MMOL/L (ref 98–112)
CHLORIDE SERPL-SCNC: 97 MMOL/L (ref 98–112)
CHLORIDE SERPL-SCNC: 98 MMOL/L (ref 98–112)
CO2 SERPL-SCNC: 31 MMOL/L (ref 21–32)
CO2 SERPL-SCNC: 31 MMOL/L (ref 21–32)
CO2 SERPL-SCNC: 32 MMOL/L (ref 21–32)
CO2 SERPL-SCNC: 33 MMOL/L (ref 21–32)
CO2 SERPL-SCNC: 33 MMOL/L (ref 21–32)
CO2 SERPL-SCNC: 34 MMOL/L (ref 21–32)
CO2 SERPL-SCNC: 36 MMOL/L (ref 21–32)
CO2 SERPL-SCNC: 37 MMOL/L (ref 21–32)
CREAT BLD-MCNC: 0.61 MG/DL
CREAT BLD-MCNC: 0.62 MG/DL
CREAT BLD-MCNC: 0.67 MG/DL
CREAT BLD-MCNC: 0.67 MG/DL
CREAT BLD-MCNC: 0.7 MG/DL
CREAT BLD-MCNC: 0.75 MG/DL
CRP SERPL-MCNC: 15.2 MG/DL (ref ?–0.3)
CRP SERPL-MCNC: 15.5 MG/DL (ref ?–0.3)
CRP SERPL-MCNC: 17.1 MG/DL (ref ?–0.3)
CRP SERPL-MCNC: 17.2 MG/DL (ref ?–0.3)
DEPRECATED HBV CORE AB SER IA-ACNC: 134.7 NG/ML
DEPRECATED HBV CORE AB SER IA-ACNC: 161.4 NG/ML
DEPRECATED HBV CORE AB SER IA-ACNC: 224.3 NG/ML
DEPRECATED HBV CORE AB SER IA-ACNC: 286.7 NG/ML
DEPRECATED RDW RBC AUTO: 46.6 FL (ref 35.1–46.3)
DEPRECATED RDW RBC AUTO: 46.8 FL (ref 35.1–46.3)
DEPRECATED RDW RBC AUTO: 46.8 FL (ref 35.1–46.3)
DEPRECATED RDW RBC AUTO: 46.9 FL (ref 35.1–46.3)
DEPRECATED RDW RBC AUTO: 47.7 FL (ref 35.1–46.3)
DEPRECATED RDW RBC AUTO: 47.7 FL (ref 35.1–46.3)
DEPRECATED RDW RBC AUTO: 47.9 FL (ref 35.1–46.3)
DEPRECATED RDW RBC AUTO: 48.9 FL (ref 35.1–46.3)
EOSINOPHIL # BLD AUTO: 0.07 X10(3) UL (ref 0–0.7)
EOSINOPHIL # BLD AUTO: 0.08 X10(3) UL (ref 0–0.7)
EOSINOPHIL # BLD AUTO: 0.09 X10(3) UL (ref 0–0.7)
EOSINOPHIL # BLD AUTO: 0.09 X10(3) UL (ref 0–0.7)
EOSINOPHIL # BLD AUTO: 0.11 X10(3) UL (ref 0–0.7)
EOSINOPHIL # BLD AUTO: 0.2 X10(3) UL (ref 0–0.7)
EOSINOPHIL # BLD AUTO: 0.21 X10(3) UL (ref 0–0.7)
EOSINOPHIL # BLD AUTO: 0.3 X10(3) UL (ref 0–0.7)
EOSINOPHIL NFR BLD AUTO: 0.8 %
EOSINOPHIL NFR BLD AUTO: 1 %
EOSINOPHIL NFR BLD AUTO: 1.2 %
EOSINOPHIL NFR BLD AUTO: 1.3 %
EOSINOPHIL NFR BLD AUTO: 1.5 %
EOSINOPHIL NFR BLD AUTO: 1.9 %
EOSINOPHIL NFR BLD AUTO: 2.2 %
EOSINOPHIL NFR BLD AUTO: 2.9 %
ERYTHROCYTE [DISTWIDTH] IN BLOOD BY AUTOMATED COUNT: 13.4 % (ref 11–15)
ERYTHROCYTE [DISTWIDTH] IN BLOOD BY AUTOMATED COUNT: 13.5 % (ref 11–15)
ERYTHROCYTE [DISTWIDTH] IN BLOOD BY AUTOMATED COUNT: 13.5 % (ref 11–15)
ERYTHROCYTE [DISTWIDTH] IN BLOOD BY AUTOMATED COUNT: 13.6 % (ref 11–15)
ERYTHROCYTE [DISTWIDTH] IN BLOOD BY AUTOMATED COUNT: 13.7 % (ref 11–15)
ERYTHROCYTE [DISTWIDTH] IN BLOOD BY AUTOMATED COUNT: 13.7 % (ref 11–15)
ERYTHROCYTE [DISTWIDTH] IN BLOOD BY AUTOMATED COUNT: 13.8 % (ref 11–15)
ERYTHROCYTE [DISTWIDTH] IN BLOOD BY AUTOMATED COUNT: 13.8 % (ref 11–15)
FIO2: 80 %
FIO2: 90 %
GLOBULIN PLAS-MCNC: 4 G/DL (ref 2.8–4.4)
GLOBULIN PLAS-MCNC: 4.2 G/DL (ref 2.8–4.4)
GLOBULIN PLAS-MCNC: 4.5 G/DL (ref 2.8–4.4)
GLOBULIN PLAS-MCNC: 4.7 G/DL (ref 2.8–4.4)
GLOBULIN PLAS-MCNC: 4.7 G/DL (ref 2.8–4.4)
GLOBULIN PLAS-MCNC: 5.3 G/DL (ref 2.8–4.4)
GLOBULIN PLAS-MCNC: 5.5 G/DL (ref 2.8–4.4)
GLOBULIN PLAS-MCNC: 5.6 G/DL (ref 2.8–4.4)
GLUCOSE BLD-MCNC: 103 MG/DL (ref 70–99)
GLUCOSE BLD-MCNC: 128 MG/DL (ref 70–99)
GLUCOSE BLD-MCNC: 146 MG/DL (ref 70–99)
GLUCOSE BLD-MCNC: 147 MG/DL (ref 70–99)
GLUCOSE BLD-MCNC: 147 MG/DL (ref 70–99)
GLUCOSE BLD-MCNC: 153 MG/DL (ref 70–99)
GLUCOSE BLD-MCNC: 161 MG/DL (ref 70–99)
GLUCOSE BLD-MCNC: 162 MG/DL (ref 70–99)
GLUCOSE BLD-MCNC: 165 MG/DL (ref 70–99)
GLUCOSE BLD-MCNC: 166 MG/DL (ref 70–99)
GLUCOSE BLD-MCNC: 175 MG/DL (ref 70–99)
GLUCOSE BLD-MCNC: 177 MG/DL (ref 70–99)
GLUCOSE BLD-MCNC: 177 MG/DL (ref 70–99)
GLUCOSE BLD-MCNC: 181 MG/DL (ref 70–99)
GLUCOSE BLD-MCNC: 182 MG/DL (ref 70–99)
GLUCOSE BLD-MCNC: 184 MG/DL (ref 70–99)
GLUCOSE BLD-MCNC: 189 MG/DL (ref 70–99)
GLUCOSE BLD-MCNC: 193 MG/DL (ref 70–99)
GLUCOSE BLD-MCNC: 194 MG/DL (ref 70–99)
GLUCOSE BLD-MCNC: 205 MG/DL (ref 70–99)
GLUCOSE BLD-MCNC: 206 MG/DL (ref 70–99)
GLUCOSE BLD-MCNC: 207 MG/DL (ref 70–99)
GLUCOSE BLD-MCNC: 209 MG/DL (ref 70–99)
GLUCOSE BLD-MCNC: 213 MG/DL (ref 70–99)
GLUCOSE BLD-MCNC: 213 MG/DL (ref 70–99)
GLUCOSE BLD-MCNC: 218 MG/DL (ref 70–99)
GLUCOSE BLD-MCNC: 218 MG/DL (ref 70–99)
GLUCOSE BLD-MCNC: 227 MG/DL (ref 70–99)
GLUCOSE BLD-MCNC: 227 MG/DL (ref 70–99)
GLUCOSE BLD-MCNC: 236 MG/DL (ref 70–99)
GLUCOSE BLD-MCNC: 237 MG/DL (ref 70–99)
GLUCOSE BLD-MCNC: 238 MG/DL (ref 70–99)
GLUCOSE BLD-MCNC: 242 MG/DL (ref 70–99)
GLUCOSE BLD-MCNC: 244 MG/DL (ref 70–99)
GLUCOSE BLD-MCNC: 246 MG/DL (ref 70–99)
GLUCOSE BLD-MCNC: 256 MG/DL (ref 70–99)
GLUCOSE BLD-MCNC: 263 MG/DL (ref 70–99)
GLUCOSE BLD-MCNC: 283 MG/DL (ref 70–99)
GLUCOSE BLD-MCNC: 343 MG/DL (ref 70–99)
HAV IGM SER QL: 1.9 MG/DL (ref 1.6–2.6)
HAV IGM SER QL: 2 MG/DL (ref 1.6–2.6)
HAV IGM SER QL: 2.1 MG/DL (ref 1.6–2.6)
HAV IGM SER QL: 2.1 MG/DL (ref 1.6–2.6)
HAV IGM SER QL: 2.2 MG/DL (ref 1.6–2.6)
HCT VFR BLD AUTO: 21.4 %
HCT VFR BLD AUTO: 22.7 %
HCT VFR BLD AUTO: 23.5 %
HCT VFR BLD AUTO: 23.5 %
HCT VFR BLD AUTO: 23.8 %
HCT VFR BLD AUTO: 24.2 %
HCT VFR BLD AUTO: 24.8 %
HCT VFR BLD AUTO: 26.5 %
HGB BLD-MCNC: 6.7 G/DL
HGB BLD-MCNC: 7.2 G/DL
HGB BLD-MCNC: 7.3 G/DL
HGB BLD-MCNC: 7.3 G/DL
HGB BLD-MCNC: 7.5 G/DL
HGB BLD-MCNC: 7.7 G/DL
HGB BLD-MCNC: 8 G/DL
HGB BLD-MCNC: 8.4 G/DL
IMM GRANULOCYTES # BLD AUTO: 0.07 X10(3) UL (ref 0–1)
IMM GRANULOCYTES # BLD AUTO: 0.1 X10(3) UL (ref 0–1)
IMM GRANULOCYTES # BLD AUTO: 0.11 X10(3) UL (ref 0–1)
IMM GRANULOCYTES # BLD AUTO: 0.12 X10(3) UL (ref 0–1)
IMM GRANULOCYTES # BLD AUTO: 0.12 X10(3) UL (ref 0–1)
IMM GRANULOCYTES # BLD AUTO: 0.14 X10(3) UL (ref 0–1)
IMM GRANULOCYTES # BLD AUTO: 0.16 X10(3) UL (ref 0–1)
IMM GRANULOCYTES # BLD AUTO: 0.17 X10(3) UL (ref 0–1)
IMM GRANULOCYTES NFR BLD: 1 %
IMM GRANULOCYTES NFR BLD: 1.2 %
IMM GRANULOCYTES NFR BLD: 1.4 %
IMM GRANULOCYTES NFR BLD: 1.6 %
IONIZED CALCIUM: 1.15 MMOL/L (ref 1.12–1.32)
IONIZED CALCIUM: 1.2 MMOL/L (ref 1.12–1.32)
LACTIC ACID ARTERIAL: <1.6 MMOL/L (ref 0.5–2)
LACTIC ACID ARTERIAL: <1.6 MMOL/L (ref 0.5–2)
LDH SERPL L TO P-CCNC: 200 U/L
LDH SERPL L TO P-CCNC: 205 U/L
LDH SERPL L TO P-CCNC: 234 U/L
LDH SERPL L TO P-CCNC: 264 U/L
LYMPHOCYTES # BLD AUTO: 0.66 X10(3) UL (ref 1–4)
LYMPHOCYTES # BLD AUTO: 0.81 X10(3) UL (ref 1–4)
LYMPHOCYTES # BLD AUTO: 0.87 X10(3) UL (ref 1–4)
LYMPHOCYTES # BLD AUTO: 0.99 X10(3) UL (ref 1–4)
LYMPHOCYTES # BLD AUTO: 1.17 X10(3) UL (ref 1–4)
LYMPHOCYTES # BLD AUTO: 1.19 X10(3) UL (ref 1–4)
LYMPHOCYTES # BLD AUTO: 1.24 X10(3) UL (ref 1–4)
LYMPHOCYTES # BLD AUTO: 1.41 X10(3) UL (ref 1–4)
LYMPHOCYTES NFR BLD AUTO: 11.5 %
LYMPHOCYTES NFR BLD AUTO: 11.6 %
LYMPHOCYTES NFR BLD AUTO: 12.8 %
LYMPHOCYTES NFR BLD AUTO: 13.1 %
LYMPHOCYTES NFR BLD AUTO: 16.5 %
LYMPHOCYTES NFR BLD AUTO: 17.5 %
LYMPHOCYTES NFR BLD AUTO: 7.4 %
LYMPHOCYTES NFR BLD AUTO: 7.6 %
M PROTEIN MFR SERPL ELPH: 5.2 G/DL (ref 6.4–8.2)
M PROTEIN MFR SERPL ELPH: 5.4 G/DL (ref 6.4–8.2)
M PROTEIN MFR SERPL ELPH: 5.8 G/DL (ref 6.4–8.2)
M PROTEIN MFR SERPL ELPH: 6 G/DL (ref 6.4–8.2)
M PROTEIN MFR SERPL ELPH: 6.1 G/DL (ref 6.4–8.2)
M PROTEIN MFR SERPL ELPH: 6.8 G/DL (ref 6.4–8.2)
M PROTEIN MFR SERPL ELPH: 6.8 G/DL (ref 6.4–8.2)
M PROTEIN MFR SERPL ELPH: 7 G/DL (ref 6.4–8.2)
MCH RBC QN AUTO: 29.7 PG (ref 26–34)
MCH RBC QN AUTO: 29.7 PG (ref 26–34)
MCH RBC QN AUTO: 29.9 PG (ref 26–34)
MCH RBC QN AUTO: 30.2 PG (ref 26–34)
MCH RBC QN AUTO: 30.3 PG (ref 26–34)
MCH RBC QN AUTO: 30.3 PG (ref 26–34)
MCH RBC QN AUTO: 30.4 PG (ref 26–34)
MCH RBC QN AUTO: 30.5 PG (ref 26–34)
MCHC RBC AUTO-ENTMCNC: 31.1 G/DL (ref 31–37)
MCHC RBC AUTO-ENTMCNC: 31.1 G/DL (ref 31–37)
MCHC RBC AUTO-ENTMCNC: 31.3 G/DL (ref 31–37)
MCHC RBC AUTO-ENTMCNC: 31.5 G/DL (ref 31–37)
MCHC RBC AUTO-ENTMCNC: 31.7 G/DL (ref 31–37)
MCHC RBC AUTO-ENTMCNC: 31.7 G/DL (ref 31–37)
MCHC RBC AUTO-ENTMCNC: 31.8 G/DL (ref 31–37)
MCHC RBC AUTO-ENTMCNC: 32.3 G/DL (ref 31–37)
MCV RBC AUTO: 93.9 FL
MCV RBC AUTO: 94.9 FL
MCV RBC AUTO: 95.4 FL
MCV RBC AUTO: 95.5 FL
MCV RBC AUTO: 96 FL
MCV RBC AUTO: 96.7 FL
METHEMOGLOBIN: 0.5 % SAT (ref 0.4–1.5)
METHEMOGLOBIN: 0.6 % SAT (ref 0.4–1.5)
MONOCYTES # BLD AUTO: 0.25 X10(3) UL (ref 0.1–1)
MONOCYTES # BLD AUTO: 0.38 X10(3) UL (ref 0.1–1)
MONOCYTES # BLD AUTO: 0.61 X10(3) UL (ref 0.1–1)
MONOCYTES # BLD AUTO: 0.71 X10(3) UL (ref 0.1–1)
MONOCYTES # BLD AUTO: 0.78 X10(3) UL (ref 0.1–1)
MONOCYTES # BLD AUTO: 0.88 X10(3) UL (ref 0.1–1)
MONOCYTES # BLD AUTO: 0.94 X10(3) UL (ref 0.1–1)
MONOCYTES # BLD AUTO: 1.01 X10(3) UL (ref 0.1–1)
MONOCYTES NFR BLD AUTO: 10 %
MONOCYTES NFR BLD AUTO: 10.3 %
MONOCYTES NFR BLD AUTO: 2.8 %
MONOCYTES NFR BLD AUTO: 5 %
MONOCYTES NFR BLD AUTO: 7.6 %
MONOCYTES NFR BLD AUTO: 8.6 %
MONOCYTES NFR BLD AUTO: 9.4 %
MONOCYTES NFR BLD AUTO: 9.7 %
NEUTROPHILS # BLD AUTO: 5.02 X10 (3) UL (ref 1.5–7.7)
NEUTROPHILS # BLD AUTO: 5.02 X10(3) UL (ref 1.5–7.7)
NEUTROPHILS # BLD AUTO: 5.53 X10 (3) UL (ref 1.5–7.7)
NEUTROPHILS # BLD AUTO: 5.53 X10(3) UL (ref 1.5–7.7)
NEUTROPHILS # BLD AUTO: 5.85 X10 (3) UL (ref 1.5–7.7)
NEUTROPHILS # BLD AUTO: 5.85 X10(3) UL (ref 1.5–7.7)
NEUTROPHILS # BLD AUTO: 6.13 X10 (3) UL (ref 1.5–7.7)
NEUTROPHILS # BLD AUTO: 6.13 X10(3) UL (ref 1.5–7.7)
NEUTROPHILS # BLD AUTO: 7.11 X10 (3) UL (ref 1.5–7.7)
NEUTROPHILS # BLD AUTO: 7.11 X10(3) UL (ref 1.5–7.7)
NEUTROPHILS # BLD AUTO: 7.67 X10 (3) UL (ref 1.5–7.7)
NEUTROPHILS # BLD AUTO: 7.67 X10(3) UL (ref 1.5–7.7)
NEUTROPHILS # BLD AUTO: 7.77 X10 (3) UL (ref 1.5–7.7)
NEUTROPHILS # BLD AUTO: 7.77 X10(3) UL (ref 1.5–7.7)
NEUTROPHILS # BLD AUTO: 8.49 X10 (3) UL (ref 1.5–7.7)
NEUTROPHILS # BLD AUTO: 8.49 X10(3) UL (ref 1.5–7.7)
NEUTROPHILS NFR BLD AUTO: 71.1 %
NEUTROPHILS NFR BLD AUTO: 72.6 %
NEUTROPHILS NFR BLD AUTO: 73 %
NEUTROPHILS NFR BLD AUTO: 73.7 %
NEUTROPHILS NFR BLD AUTO: 74.9 %
NEUTROPHILS NFR BLD AUTO: 79.2 %
NEUTROPHILS NFR BLD AUTO: 80.8 %
NEUTROPHILS NFR BLD AUTO: 87.5 %
OSMOLALITY SERPL CALC.SUM OF ELEC: 282 MOSM/KG (ref 275–295)
OSMOLALITY SERPL CALC.SUM OF ELEC: 283 MOSM/KG (ref 275–295)
OSMOLALITY SERPL CALC.SUM OF ELEC: 285 MOSM/KG (ref 275–295)
OSMOLALITY SERPL CALC.SUM OF ELEC: 285 MOSM/KG (ref 275–295)
OSMOLALITY SERPL CALC.SUM OF ELEC: 286 MOSM/KG (ref 275–295)
OSMOLALITY SERPL CALC.SUM OF ELEC: 287 MOSM/KG (ref 275–295)
OSMOLALITY SERPL CALC.SUM OF ELEC: 288 MOSM/KG (ref 275–295)
OSMOLALITY SERPL CALC.SUM OF ELEC: 290 MOSM/KG (ref 275–295)
PATIENT TEMPERATURE: 100 F
PATIENT TEMPERATURE: 98.4 F
PEEP: 5 CM H2O
PEEP: 5 CM H2O
PHOSPHATE SERPL-MCNC: 2.6 MG/DL (ref 2.5–4.9)
PHOSPHATE SERPL-MCNC: 2.7 MG/DL (ref 2.5–4.9)
PHOSPHATE SERPL-MCNC: 2.8 MG/DL (ref 2.5–4.9)
PHOSPHATE SERPL-MCNC: 2.9 MG/DL (ref 2.5–4.9)
PHOSPHATE SERPL-MCNC: 3 MG/DL (ref 2.5–4.9)
PHOSPHATE SERPL-MCNC: 3.1 MG/DL (ref 2.5–4.9)
PHOSPHATE SERPL-MCNC: 3.5 MG/DL (ref 2.5–4.9)
PHOSPHATE SERPL-MCNC: 3.7 MG/DL (ref 2.5–4.9)
PLATELET # BLD AUTO: 245 10(3)UL (ref 150–450)
PLATELET # BLD AUTO: 254 10(3)UL (ref 150–450)
PLATELET # BLD AUTO: 255 10(3)UL (ref 150–450)
PLATELET # BLD AUTO: 271 10(3)UL (ref 150–450)
PLATELET # BLD AUTO: 273 10(3)UL (ref 150–450)
PLATELET # BLD AUTO: 299 10(3)UL (ref 150–450)
PLATELET # BLD AUTO: 318 10(3)UL (ref 150–450)
PLATELET # BLD AUTO: 330 10(3)UL (ref 150–450)
POTASSIUM BLOOD GAS: 3.7 MMOL/L (ref 3.6–5.1)
POTASSIUM BLOOD GAS: 3.8 MMOL/L (ref 3.6–5.1)
POTASSIUM SERPL-SCNC: 3.2 MMOL/L (ref 3.5–5.1)
POTASSIUM SERPL-SCNC: 3.5 MMOL/L (ref 3.5–5.1)
POTASSIUM SERPL-SCNC: 3.5 MMOL/L (ref 3.5–5.1)
POTASSIUM SERPL-SCNC: 3.7 MMOL/L (ref 3.5–5.1)
POTASSIUM SERPL-SCNC: 3.8 MMOL/L (ref 3.5–5.1)
POTASSIUM SERPL-SCNC: 4 MMOL/L (ref 3.5–5.1)
POTASSIUM SERPL-SCNC: 4.1 MMOL/L (ref 3.5–5.1)
POTASSIUM SERPL-SCNC: 4.2 MMOL/L (ref 3.5–5.1)
POTASSIUM SERPL-SCNC: 4.5 MMOL/L (ref 3.5–5.1)
RBC # BLD AUTO: 2.24 X10(6)UL
RBC # BLD AUTO: 2.38 X10(6)UL
RBC # BLD AUTO: 2.46 X10(6)UL
RBC # BLD AUTO: 2.55 X10(6)UL
RBC # BLD AUTO: 2.64 X10(6)UL
RBC # BLD AUTO: 2.76 X10(6)UL
SODIUM BLOOD GAS: 135 MMOL/L (ref 136–144)
SODIUM BLOOD GAS: 136 MMOL/L (ref 136–144)
SODIUM SERPL-SCNC: 132 MMOL/L (ref 136–145)
SODIUM SERPL-SCNC: 132 MMOL/L (ref 136–145)
SODIUM SERPL-SCNC: 135 MMOL/L (ref 136–145)
SODIUM SERPL-SCNC: 136 MMOL/L (ref 136–145)
SODIUM SERPL-SCNC: 137 MMOL/L (ref 136–145)
SODIUM SERPL-SCNC: 138 MMOL/L (ref 136–145)
TIDAL VOLUME: 400 ML
TIDAL VOLUME: 400 ML
TOTAL HEMOGLOBIN: 7.7 G/DL
TOTAL HEMOGLOBIN: 7.9 G/DL
TRIGL SERPL-MCNC: 108 MG/DL (ref 30–149)
TSI SER-ACNC: 6.02 MIU/ML (ref 0.36–3.74)
VENT RATE: 14 /MIN
VENT RATE: 16 /MIN
WBC # BLD AUTO: 10.2 X10(3) UL (ref 4–11)
WBC # BLD AUTO: 10.7 X10(3) UL (ref 4–11)
WBC # BLD AUTO: 7.1 X10(3) UL (ref 4–11)
WBC # BLD AUTO: 7.6 X10(3) UL (ref 4–11)
WBC # BLD AUTO: 7.6 X10(3) UL (ref 4–11)
WBC # BLD AUTO: 8.1 X10(3) UL (ref 4–11)
WBC # BLD AUTO: 8.9 X10(3) UL (ref 4–11)
WBC # BLD AUTO: 9.7 X10(3) UL (ref 4–11)

## 2021-01-01 PROCEDURE — 99232 SBSQ HOSP IP/OBS MODERATE 35: CPT | Performed by: INTERNAL MEDICINE

## 2021-01-01 PROCEDURE — 99233 SBSQ HOSP IP/OBS HIGH 50: CPT | Performed by: INTERNAL MEDICINE

## 2021-01-01 PROCEDURE — 71045 X-RAY EXAM CHEST 1 VIEW: CPT | Performed by: INTERNAL MEDICINE

## 2021-01-01 RX ORDER — GLYCOPYRROLATE 0.2 MG/ML
0.4 INJECTION, SOLUTION INTRAMUSCULAR; INTRAVENOUS ONCE
Status: COMPLETED | OUTPATIENT
Start: 2021-01-01 | End: 2021-01-01

## 2021-01-01 RX ORDER — SCOLOPAMINE TRANSDERMAL SYSTEM 1 MG/1
1 PATCH, EXTENDED RELEASE TRANSDERMAL
Status: DISCONTINUED | OUTPATIENT
Start: 2021-01-01 | End: 2021-01-01

## 2021-01-01 RX ORDER — FUROSEMIDE 10 MG/ML
20 INJECTION INTRAMUSCULAR; INTRAVENOUS ONCE
Status: DISCONTINUED | OUTPATIENT
Start: 2021-01-01 | End: 2021-01-01

## 2021-01-01 RX ORDER — TIZANIDINE 2 MG/1
2 TABLET ORAL 2 TIMES DAILY
Status: DISCONTINUED | OUTPATIENT
Start: 2021-01-01 | End: 2021-01-01

## 2021-01-01 RX ORDER — POTASSIUM CHLORIDE 29.8 MG/ML
40 INJECTION INTRAVENOUS ONCE
Status: COMPLETED | OUTPATIENT
Start: 2021-01-01 | End: 2021-01-01

## 2021-01-01 RX ORDER — FUROSEMIDE 10 MG/ML
20 INJECTION INTRAMUSCULAR; INTRAVENOUS ONCE
Status: COMPLETED | OUTPATIENT
Start: 2021-01-01 | End: 2021-01-01

## 2021-01-01 RX ORDER — POTASSIUM CHLORIDE 14.9 MG/ML
20 INJECTION INTRAVENOUS ONCE
Status: COMPLETED | OUTPATIENT
Start: 2021-01-01 | End: 2021-01-01

## 2021-01-01 RX ORDER — GLYCOPYRROLATE 0.2 MG/ML
0.2 INJECTION, SOLUTION INTRAMUSCULAR; INTRAVENOUS
Status: DISCONTINUED | OUTPATIENT
Start: 2021-01-01 | End: 2021-01-01

## 2021-01-01 RX ORDER — LORAZEPAM 2 MG/ML
1 INJECTION INTRAMUSCULAR EVERY 4 HOURS PRN
Status: DISCONTINUED | OUTPATIENT
Start: 2021-01-01 | End: 2021-01-01

## 2021-01-01 RX ORDER — METOCLOPRAMIDE HYDROCHLORIDE 5 MG/ML
10 INJECTION INTRAMUSCULAR; INTRAVENOUS EVERY 8 HOURS PRN
Status: DISCONTINUED | OUTPATIENT
Start: 2021-01-01 | End: 2021-01-01

## 2021-01-01 RX ORDER — BACLOFEN 20 MG/1
20 TABLET ORAL 3 TIMES DAILY
Status: DISCONTINUED | OUTPATIENT
Start: 2021-01-01 | End: 2021-01-01

## 2021-01-01 RX ORDER — ONDANSETRON 2 MG/ML
4 INJECTION INTRAMUSCULAR; INTRAVENOUS EVERY 6 HOURS PRN
Status: DISCONTINUED | OUTPATIENT
Start: 2021-01-01 | End: 2021-01-01

## 2021-01-01 RX ORDER — LORAZEPAM 2 MG/ML
1 INJECTION INTRAMUSCULAR ONCE
Status: COMPLETED | OUTPATIENT
Start: 2021-01-01 | End: 2021-01-01

## 2021-01-01 NOTE — PROGRESS NOTES
Received pt on VC+ 16/400/80%/+5. Pt saturating low 90's throughout the night, unable to wean FiO2. Will continue to monitor and wean as tolerated.       01/01/21 1552   Vent Information   $ RT Standby Charge (per 15 min) 1  (vent check)   Ventilator Initia

## 2021-01-01 NOTE — PLAN OF CARE
Pt remains vented on 80% +5 PEEP. Sedated on propofol and fentanyl. Sedation paused this morning-pt arouses but unable to follow commands. Work of breathing increased and pt desat to high 80s-sedation resumed and maintained throughout the day.  Turned q2, t

## 2021-01-01 NOTE — PROGRESS NOTES
Patient is on Vent settings noted below. Breath sounds are bilaterally diminished. Suctioning scant amount of thick white/clear secretions. No changes made to vent settings. Will continue to monitor.       01/01/21 5171   Vent Information   $ RT Standby Corinna

## 2021-01-01 NOTE — PROGRESS NOTES
Rufus Hodgson Patient Status:  Inpatient    3/24/1945 MRN [id number]   [facility] 4SW-A Attending Camilo Niño,*   Hosp Day # 6 PCP Tarri Boas, MD   For PPE stewardship, direct bedside visit/exam deferred, and encounter i detemir (LEVEMIR) 100 UNIT/ML flextouch 6 Units, 6 Units, Subcutaneous, Q12H ARIELLE    •  hydrALAzine HCl (APRESOLINE) injection 10 mg, 10 mg, Intravenous, Q4H PRN    •  Insulin Aspart Pen (NOVOLOG) 100 UNIT/ML flexpen 1-20 Units, 1-20 Units, Subcutaneous, Q6 Glucose-Vitamin C (DEX-4) chewable tab 4 tablet, 4 tablet, Oral, Q15 Min PRN    Or    •  dextrose 50 % injection 50 mL, 50 mL, Intravenous, Q15 Min PRN    Or    •  glucose (DEX4) oral liquid 30 g, 30 g, Oral, Q15 Min PRN    Or    •  Glucose-Vitamin C (DEX- TFs are held or discontinued. Continue levothyroxine at home dose, will add TSH to blood in lab to make sure it is in acceptable range. Will continue to follow.      Corey Anguiano MD   Endocrinology, Diabetes and Metabolism   [facility]   G

## 2021-01-01 NOTE — PLAN OF CARE
Received pt on Propofol and Fentanyl. Pt occasionally follows simple command to the right hand, Tolerating current vent settings, SR, bp stable, Right PICC intact. OGT to tube feeding , tolerating, Gabriel irrigated adequate urine output noted.  Pt has bilate

## 2021-01-01 NOTE — PROGRESS NOTES
[facility] PULMONARY/CRITICAL CARE    S: Pt remains intubated and on mechanical vent. Sedated.      Meds:  • potassium chloride  40 mEq Intravenous Once   • insulin detemir  7 Units Subcutaneous Q12H [facility]   • Insulin Aspart Pen  1-20 Units Subcutaneous Q6H   • famo appreciated.   Abdomen - soft, nontender to palpation   Extremities - + RUE edema, picc line in place        Labs:  Recent Labs   Lab 12/30/20  0419 12/31/20  0430 01/01/21  0350   WBC 13.6* 9.3 8.9   HGB 8.9* 8.2* 8.0*   .0 315.0 299.0     Recent La feeds  - correct electrolytes prn   6. Prophylaxis  - LMWH   - H2RB  7. Dispo  - DNAR. Palliative following    - ICU, remains critical  - ongoing discussions w/  re: Ivon Durant       Critical care time: 28 min      Shorty Wilson M.D.   Pulmonary/Critical C

## 2021-01-01 NOTE — PROGRESS NOTES
[facility] Hospitalist Progress Note     PCP: Shireen Lacy MD    CC:  Follow up    SUBJECTIVE:  No acute events    OBJECTIVE:  Temp:  [97.4 °F (36.3 °C)-98.6 °F (37 °C)] 98.3 °F (36.8 °C)  Pulse:  [65-95] 89  Resp:  [12-28] 12  BP: ()/(38-67) 117/52 1.8 2. 1  --  2.3 2.3 2.2 2.0   PHOS 2.0*  --   --  1.8* 2.1* 2.7 2.7   * 188* 379* 220* 183* 196* 153*       Recent Labs   Lab 12/26/20  0450 12/26/20  0450 12/28/20  0350 12/29/20  0431 12/30/20  0419 12/31/20  0430 01/01/21  0350   ALT 22  --   -- Oral Daily   • piperacillin-tazobactam  3.375 g Intravenous Q8H     • propofol 40 mcg/kg/min (01/01/21 3634)   • fentanyl 50 mcg/hr (01/01/21 1208)   • dexmedetomidine Stopped (12/26/20 5100)   • norepinephrine Stopped (12/27/20 8127)   • vasopressin (PITR BP during admit     Bradycardia; possibly related to sedation  Pt is on telemetry  EKG on admit normal   pt was samra to 20s on 12/22/20  Cards on, appreciate: per cardiology no evidece of heart block, no further work up indicated    **rectal prolapse- see

## 2021-01-01 NOTE — PROGRESS NOTES
[facility]                INFECTIOUS DISEASE PROGRESS NOTE    Kale De Jesus Patient Status:  Inpatient    3/24/1945 MRN [id number]   [facility] 4SW-A Attending Ember Schmitt MD   [address] Day # 6 PCP Mai Woods MD     An Microbiology    Hospital Encounter on 12/21/20   1.  BLOOD CULTURE     Status: None    Collection Time: 12/23/20 11:29 AM    Specimen: Bld,Picc Line; Blood   Result Value Ref Range    Blood Culture Result No Growth 5 Days N/A   2. URINE CULTURE, ROUTINE

## 2021-01-02 NOTE — PROGRESS NOTES
Patient received on ventilator as documented. Still requiring 80% FiO2 with saturations in the low 90's. Will continue to monitor closely and wean if able. Routine care given.         01/01/21 9256   Vent Information   $ RT Standby Charge (per 15 min) 1   V

## 2021-01-02 NOTE — PROGRESS NOTES
Nata Montgomery Patient Status:  Inpatient    3/24/1945 MRN [id number]   [facility] 4SW-A Attending Jay Duty,*   Hosp Day # 15 PCP Shirl Collet, MD   For PPE stewardship, direct bedside visit/exam deferred, and encounter i MEDICATIONS      •  insulin detemir (LEVEMIR) 100 UNIT/ML flextouch 6 Units, 6 Units, Subcutaneous, Q12H ARIELLE    •  hydrALAzine HCl (APRESOLINE) injection 10 mg, 10 mg, Intravenous, Q4H PRN    •  Insulin Aspart Pen (NOVOLOG) 100 UNIT/ML flexpen 1-20 U g, Oral, Q15 Min PRN    Or    •  Glucose-Vitamin C (DEX-4) chewable tab 4 tablet, 4 tablet, Oral, Q15 Min PRN    Or    •  dextrose 50 % injection 50 mL, 50 mL, Intravenous, Q15 Min PRN    Or    •  glucose (DEX4) oral liquid 30 g, 30 g, Oral, Q15 Min PRN

## 2021-01-02 NOTE — PROGRESS NOTES
Received patient on VC+ 16/400/80%/+5. Changed vent settings to VC+ 14/400/80%/+5 per MD. ABG done. Breath sounds are bilaterally diminished. Suctioning scant amount of thick white/clear secretions. Will continue to monitor.       01/02/21 1546   Vent Infor

## 2021-01-02 NOTE — PROGRESS NOTES
[facility] Hospitalist Progress Note     PCP: Nanette Morrison MD    CC:  Follow up    SUBJECTIVE:  No acute events    OBJECTIVE:  Temp:  [98.4 °F (36.9 °C)-98.9 °F (37.2 °C)] 98.4 °F (36.9 °C)  Pulse:  [73-96] 78  Resp:  [14-24] 14  BP: (105-133)/(39-55) 117/ Labs   Lab 12/29/20  0431 12/30/20  0419 12/31/20  0430 01/01/21  0350 01/02/21  0414   ALT 12* 14 11* 9* 9*   AST 17 16 10* 9* 15   ALB 1.4* 1.6* 1.3* 1.2* 1.2*   * 293* 263* 234 205       Recent Labs   Lab 01/01/21  1125 01/01/21  1740 01/01/21  2 citrate **OR** fentaNYL citrate, acetaminophen **OR** acetaminophen **OR** acetaminophen, fentanyl, PEG 3350, magnesium hydroxide, bisacodyl, Fleet Enema, glucose **OR** Glucose-Vitamin C **OR** dextrose **OR** glucose **OR** Glucose-Vitamin C, norepinephr anemia-transfuse for Hgb <7  - No occult bleeding on exam  - CT ABd/Pelvis checked on 12/28 looks OK     Preventative lovenox was on hold given anemia. Now restarted. Prognosis poor. Family meeting again on Monday planned.     Rohit Hernandes MD  [facility]

## 2021-01-02 NOTE — PLAN OF CARE
Received pt on Propofol and Fentanyl drips, Pt does not follow simple commands, left arm contracted with hand splint on. Marginal O2 sats remains on 80% +5, lungs diminished, SR, bp stable, Right PICC intact.  Tolerating current OGT tube feedings, Nery wit

## 2021-01-02 NOTE — PROGRESS NOTES
One [facility] Patient Status:  Inpatient    3/24/1945 MRN [id number]   [facility] 4SW-A Attending Darrel Polanco,*   [address] Day # 15 PCP Sarahy Park MD     Critical Care Progress Note     Date of Admission: 15 Daily PRN  •  magnesium hydroxide (MILK OF MAGNESIA) 400 MG/5ML suspension 30 mL, 30 mL, Oral, Daily PRN  •  bisacodyl (DULCOLAX) rectal suppository 10 mg, 10 mg, Rectal, Daily PRN  •  Fleet Enema (FLEET) 7-19 GM/118ML enema 133 mL, 1 enema, Rectal, Once P I/O last 3 completed shifts:   In: 3935.5 [I.V.:1037.5; NG/GT:2498; IV PIGGYBACK:400]  Out: 4805 [WBJLT:3840]  I/O this shift:  In: 170 [NG/GT:170]  Out: 200 [Urine:200]      Physical Exam:                          ZIZHHJQ: YTOBOBNKS, sedated              01/02/21  0414   CRP 18.60*   < > 12.10* 15.50* 17.10*   VITALY 177.4   < > 140.0 134.7 161.4   *   < > 263* 234 205   DDIMER 3.97*  --   --   --   --     < > = values in this interval not displayed.        Imaging: I have independently visualized all r

## 2021-01-03 NOTE — RESPIRATORY THERAPY NOTE
Received pt vented, VC+ 14/400/80%/+5, tolerating well. Pt with episodes of desaturation into low 80's but usually recovers. FiO2 increased to 100%, will bring back down as able. Will continue to monitor closely.

## 2021-01-03 NOTE — PROGRESS NOTES
[facility] Hospitalist Progress Note     PCP: Mai Woods MD    CC:  Follow up    SUBJECTIVE:  No acute events    OBJECTIVE:  Temp:  [98 °F (36.7 °C)-98.6 °F (37 °C)] 98 °F (36.7 °C)  Pulse:  [] 103  Resp:  [13-33] 20  BP: (106-161)/(43-73) 131/54 2.7 2.7  --  3.1 2.9   * 196* 153*  --  146* 194*       Recent Labs   Lab 12/30/20  0419 12/31/20  0430 01/01/21  0350 01/02/21  0414 01/03/21  0510   ALT 14 11* 9* 9* 10*   AST 16 10* 9* 15 17   ALB 1.6* 1.3* 1.2* 1.2* 1.3*   * 263* 234 205 acetaminophen, fentanyl, PEG 3350, magnesium hydroxide, bisacodyl, Fleet Enema, glucose **OR** Glucose-Vitamin C **OR** dextrose **OR** glucose **OR** Glucose-Vitamin C, norepinephrine, vasopressin (PITRESSIN) infusion for shock       Assessment/Plan: OK     Preventative lovenox was on hold given anemia. Now restarted. Prognosis poor. Family meeting again on Monday planned.     Media MD Janelle  [facility] Hospitalist  Pager: [phone]

## 2021-01-03 NOTE — PROGRESS NOTES
One [facility] Patient Status:  Inpatient    3/24/1945 MRN [id number]   [facility] 4SW-A Attending Antonieta Hernández,*   [facility] Day # 15 PCP Mino Levine MD     Critical Care Progress Note     Date of Admission: 15 Daily PRN  •  magnesium hydroxide (MILK OF MAGNESIA) 400 MG/5ML suspension 30 mL, 30 mL, Oral, Daily PRN  •  bisacodyl (DULCOLAX) rectal suppository 10 mg, 10 mg, Rectal, Daily PRN  •  Fleet Enema (FLEET) 7-19 GM/118ML enema 133 mL, 1 enema, Rectal, Once P shifts:   In: 3382.4 [I.V.:982.4; NG/GT:2400]  Out: 8889 [Urine:3885]  I/O this shift:  In: 351 [I.V.:181; NG/GT:170]  Out: 450 [Urine:450]      Physical Exam:                          ILLKMFQ: FVCZYICVQ, sedated                          EIREH: ETT in place  205 264*       Imaging: I have independently visualized all relevant chest imaging in PACS. I agree with the radiology interpretation except where noted. ASSESSMENT/PLAN:  1.  Acute hypoxemic respiratory failure - intubated 12/21 secondary

## 2021-01-03 NOTE — PROGRESS NOTES
Robert Flores Patient Status:  Inpatient    3/24/1945 MRN [id number]   [facility] 4SW-A Attending Frank Lozano,*   Hosp Day # 15 PCP Chiquita Wang MD   For PPE stewardship, direct bedside visit/exam deferred, and encounter i flextouch 6 Units, 6 Units, Subcutaneous, Q12H ARIELLE    •  hydrALAzine HCl (APRESOLINE) injection 10 mg, 10 mg, Intravenous, Q4H PRN    •  Insulin Aspart Pen (NOVOLOG) 100 UNIT/ML flexpen 1-20 Units, 1-20 Units, Subcutaneous, Q6H    •  famoTIDine (PEPCID) ta chewable tab 4 tablet, 4 tablet, Oral, Q15 Min PRN    Or    •  dextrose 50 % injection 50 mL, 50 mL, Intravenous, Q15 Min PRN    Or    •  glucose (DEX4) oral liquid 30 g, 30 g, Oral, Q15 Min PRN    Or    •  Glucose-Vitamin C (DEX-4) chewable tab 8 tablet,

## 2021-01-03 NOTE — PLAN OF CARE
Remains intubated, not following commands vent, sedated, desats when repositioned , lots of secretions, diuresed today and BP noted response. Tolerating tube feeding, completely dependent on staff for repositioning Q 2 hrs. Contracted left side.  Gabriel and

## 2021-01-03 NOTE — PLAN OF CARE
Assumed care of patient at 0730. Intubated to ventilator support, sedation with Propofol and fentanyl. Lft arm contracted; hand splint in place; no purposeful movements other extremities. Withdraws/wince to pain. O2 sats 88-91 on 80% FiO2.  Lung sounds dimi

## 2021-01-03 NOTE — PLAN OF CARE
Still orally intubated to MV at 80% FIO2,  , AC 16 and PEEP +5. RASS -2, Propofol drip ongoing for sedation and Fentanyl drip ongoing for pain while intubated via PICC line.  OGT feeding well tolerated, minimal gastric residuals noted, abdomen is roun

## 2021-01-04 NOTE — DIETARY NOTE
1230 [facility] ASSESSMENT    Pt does not meet malnutrition criteria at this time.     NUTRITION DIAGNOSIS/PROBLEM:  Inadequate energy intake related to decreased ability to consume sufficient energy intake as evidenced by current vent/ tube feeding recs. Pt intubated. Receiving propofol at 6.8 ml/hr, which provides: 180 kcal. Pt with pressure ulcers on buttock and right heel. Wound care consulted. Palliative Care also on consult. PMH: MS, DM1, HTN, HLD, CVA, dementia.      ANTHROPOMETRIC nutrition risk    FOLLOW-UP DATE: 1/8/2021    Iggy Kendall MS, RD, LDN  Clinical Dietitian  Pager# [phone]

## 2021-01-04 NOTE — PROGRESS NOTES
[id number] [facility] 149 Follow Up    Rufus Hodgson Patient Status:  Inpatient    3/24/1945 MRN [id number]   [facility] 4SW-A Attending Camilo Niño,*   [facility] Day # 15 PCP Tarri Boas, MD   Summary: Rufus Hodgson is Facility-Administered Medications:   •  insulin detemir (LEVEMIR) 100 UNIT/ML flextouch 6 Units, 6 Units, Subcutaneous, Q12H ARIELLE  •  hydrALAzine HCl (APRESOLINE) injection 10 mg, 10 mg, Intravenous, Q4H PRN  •  Insulin Aspart Pen (NOVOLOG) 100 UNIT/ML flex Glucose-Vitamin C (DEX-4) chewable tab 4 tablet, 4 tablet, Oral, Q15 Min PRN **OR** dextrose 50 % injection 50 mL, 50 mL, Intravenous, Q15 Min PRN **OR** glucose (DEX4) oral liquid 30 g, 30 g, Oral, Q15 Min PRN **OR** Glucose-Vitamin C (DEX-4) chewable tab °C), temperature source Temporal, resp. rate 15, height 5' 8\" (1.727 m), weight 138 lb 10.7 oz (62.9 kg), SpO2 93 %, not currently breastfeeding. Body mass index is 21.08 kg/m².   Palliative Performance Scale : 30%     Healthcare Agent Appointed: Yes  Diana Cannon

## 2021-01-04 NOTE — PROGRESS NOTES
[facility]                INFECTIOUS DISEASE PROGRESS NOTE    Jolene Hull Patient Status:  Inpatient    3/24/1945 MRN [id number]   [facility] 4SW-A Attending Gregg Major MD   [facility] Day # 15 PCP Rosmery Gu MD 5 Days N/A   2. URINE CULTURE, ROUTINE     Status: Abnormal    Collection Time: 12/21/20  5:38 PM    Specimen: Urine, clean catch   Result Value Ref Range    Urine Culture >100,000 CFU/ML Aerococcus urinae (A) N/A           Problem list reviewed:  Patient

## 2021-01-04 NOTE — PROGRESS NOTES
Mack Nevarez Patient Status:  Inpatient    3/24/1945 MRN [id number]   [place] [facility] 4SW-A Attending Esteban Copeland,*   Hosp Day # 15 PCP Earlene Salazar MD   For PPE stewardship, direct bedside visit/exam deferred, and encounter i UNIT/ML flextouch 6 Units, 6 Units, Subcutaneous, Q12H [facility]    •  hydrALAzine HCl (APRESOLINE) injection 10 mg, 10 mg, Intravenous, Q4H PRN    •  Insulin Aspart Pen (NOVOLOG) 100 UNIT/ML flexpen 1-20 Units, 1-20 Units, Subcutaneous, Q6H    •  famoTIDine (PE Q15 Min PRN    Or    •  glucose (DEX4) oral liquid 30 g, 30 g, Oral, Q15 Min PRN    Or    •  Glucose-Vitamin C (DEX-4) chewable tab 8 tablet, 8 tablet, Oral, Q15 Min PRN    •  Enoxaparin Sodium (LOVENOX) 30 MG/0.3ML injection 30 mg, 0.6 mg/kg, Subcutaneous

## 2021-01-04 NOTE — PLAN OF CARE
Assumed care last night at 1930H with same vent  setting, suctioned large amount of whitish seretions from mouth and thick white secretions from ET, still desaturates during turning and movement.  Oral GT feeding well tolerated, abdomen is distended, good b Date:  17     Name:  Chica Garcia  :  1965  MRN:  [id number]     PCP:  Tracy Harley MD    Chief Complaint   Patient presents with    Migraine     HISTORY OF PRESENT ILLNESS: Follow up for new daily persistent headache. Last office visit, he increased the Elavil to 75mg nightly. He is now having headache every 2-3 days. None have been severe and more than 90% of the time, he is not acutely treating the headache. Except as noted above, denies  fever, chills, cough. No CP or SOB. No dysuria, loss of bowel or bladder control. No Weight loss. Appetite good. Sleeping well. No sweats. No edema. No bruising or bleeding. No nausea or vomit. No diarrhea. No frequency, urgency, No depressive sxs. No anxiety. Denies sore throat, nasal congestion, nasal discharge, epistaxis, tinnitus, hearing loss, back pain, muscle pain, or joint pain. Current Outpatient Prescriptions   Medication Sig    amitriptyline (ELAVIL) 25 mg tablet Take 3 Tabs by mouth nightly.  atorvastatin (LIPITOR) 40 mg tablet     amLODIPine-benazepril (LOTREL) 5-10 mg per capsule      No current facility-administered medications for this visit. No Known Allergies  Past Medical History:   Diagnosis Date    Anxiety     Cough     Fatigue     Headache     Hypertension     STEFANIE (obstructive sleep apnea)     Snoring     cpap     Past Surgical History:   Procedure Laterality Date    HX GI      HX HEENT      lasik    HX REFRACTIVE SURGERY       Social History     Social History    Marital status:      Spouse name: N/A    Number of children: N/A    Years of education: N/A     Occupational History    Not on file.      Social History Main Topics    Smoking status: Never Smoker    Smokeless tobacco: Never Used    Alcohol use 0.0 oz/week     0 Standard drinks or equivalent per week    Drug use: Not on file    Sexual activity: Not on file     Other Topics Concern    Not on file Social History Narrative     Family History   Problem Relation Age of Onset    Cancer Mother     Cancer Father     Heart Disease Maternal Grandfather        PHYSICAL EXAMINATION:    Visit Vitals    /82    Pulse 74    Resp 20    Ht 5' 11\" (1.803 m)    Wt 123.6 kg (272 lb 6.4 oz)    SpO2 98%    BMI 37.99 kg/m2     General: Well defined, nourished, and groomed individual in no acute distress. Neck: Supple, nontender, no bruits, no pain with resistance to active range of motion. Heart: Regular rate and rhythm, no murmurs, rub, or gallop. Normal S1S2. Lungs: Clear to auscultation bilaterally with equal chest expansion, no cough, no wheeze  Musculoskeletal: Extremities revealed no edema and had full range of motion of joints. Psych: Good mood and bright affect      NEUROLOGICAL EXAMINATION:   Mental Status: Alert and oriented to person, place, and time with recent and remote memory intact. Attention span and concentration are normal. Speech is fluent with a full fund of knowledge.       Cranial Nerves:   II, III, IV, VI: Visual acuity grossly intact. Visual fields are normal.   Pupils are equal, round, and reactive to light and accommodation. Extra-ocular movements are full and fluid. Fundoscopic exam was benign, no ptosis or nystagmus. V-XII: Hearing is grossly intact. Facial features are symmetric, with normal sensation and strength. The palate rises symmetrically and the tongue protrudes midline. Sternocleidomastoids 5/5.       Motor Examination: Normal tone, bulk, and strength, 5/5 muscle strength throughout.       Coordination: Finger to nose was normal. No resting or intention tremor      Gait and Station: Steady while walking. Normal arm swing. No pronator drift. No muscle wasting or fasiculations noted.       Reflexes: DTRs 2+ throughout.      ASSESSMENT AND PLAN    ICD-10-CM ICD-9-CM    1. New daily persistent headache G44.52 339.42 amitriptyline (ELAVIL) 100 mg tablet     With each increase of the Elavil, the headaches have improved. While certainly less intense, he continues to have the headaches with some regularity. As such, the Elavil was increased further to 100mg nightly. He has not noted any side effects from the medication and has not tolerance issues. Ideally, once the headaches are better managed, I would like to discontinue the Elavil. Sarah Viveros Carbon

## 2021-01-04 NOTE — PROGRESS NOTES
One [facility] Patient Status:  Inpatient    3/24/1945 MRN [id number]   [facility] 4SW-A Attending Jay Duty,*   [address] Day # 15 PCP Shirl Collet, MD     Critical Care Progress Note     Date of Admission: 15 mg, Oral, BID  •  PEG 3350 (MIRALAX) powder packet 17 g, 17 g, Oral, Daily PRN  •  magnesium hydroxide (MILK OF MAGNESIA) 400 MG/5ML suspension 30 mL, 30 mL, Oral, Daily PRN  •  bisacodyl (DULCOLAX) rectal suppository 10 mg, 10 mg, Rectal, Daily PRN  •  Fl : 125 lb 11.2 oz (57 kg)  10/14/20 : 125 lb 6.4 oz (56.9 kg)       I/O last 3 completed shifts:   In: 0992 [I.V.:1000; NG/GT:2418]  Out: 7972 [Urine:4900]  I/O this shift:  In: 270 [NG/GT:170; IV PIGGYBACK:100]  Out: 900 [Urine:900]      Physical Exam:      01/01/21  0350 01/02/21  0414 01/03/21  0510   CRP 15.50* 17.10* 17.20*   VITALY 134.7 161.4 224.3    205 264*       Imaging: I have independently visualized all relevant chest imaging in PACS.   I agree with the radiology interpretation except where no

## 2021-01-04 NOTE — RESPIRATORY THERAPY NOTE
Received pt vented, VC+ 14/400/90%/+5, tolerating well. No changes made. Will continue to monitor closely.

## 2021-01-04 NOTE — PROGRESS NOTES
[facility] hospitalist daily note  Seen on 1/4/21    S; intubated    Medications in Epic    PE    01/04/21  1600   BP: 120/51   Pulse: 82   Resp: 13   Temp: 98.2 °F (36.8 °C)     Seen via glass door to preserve PPE  Spoke with RN and reviewed critical care exam

## 2021-01-05 NOTE — RESPIRATORY THERAPY NOTE
Pt remain stable on ventilator setting or RR 14  FiO2 100 % Peep 5. Breath sounds are clear diminish with small white thick secretions. Unable to wean FiO2 as pt saturating 93% on pulsox.

## 2021-01-05 NOTE — PLAN OF CARE
Assumed at 1930H last night, still orally intubated to [facility] mode 80% FIO2 +5 PEEP, sedated with Propofol at low dose and Fentanyl for pain while intubated, OGT feeding well tolerated, minimal gastric residual noted, abdomen is distended with good bowel sound

## 2021-01-05 NOTE — PROGRESS NOTES
One [facility] Patient Status:  Inpatient    3/24/1945 MRN [id number]   [facility] 4SW-A Attending Darrel Polanco,*   Hosp Day # 13 PCP Sarahy Park MD     Critical Care Progress Note     Date of Admission: 15 magnesium hydroxide (MILK OF MAGNESIA) 400 MG/5ML suspension 30 mL, 30 mL, Oral, Daily PRN  •  bisacodyl (DULCOLAX) rectal suppository 10 mg, 10 mg, Rectal, Daily PRN  •  Fleet Enema (FLEET) 7-19 GM/118ML enema 133 mL, 1 enema, Rectal, Once PRN  •  Chlorhe or crackles                           Chest wall: No tenderness or deformity.                         NPCSQ: [id number], regular rhythm                           Abdomen: soft, non-tender, non-distended, positive BS.                           [id number]: N 12/21. Extubated 12/24, re-intubated 12/25  - continue present vent settings, wean Fio2 as able.   Not PEEP responsive  - CXR with bilateral opacities  - lasix today, will also give dose of diamox given rising bicarb  - weaning trials if/when oxygenation im

## 2021-01-05 NOTE — PROGRESS NOTES
[id number] [facility] 149 Follow Up    Jeremiah Raya Patient Status:  Inpatient    3/24/1945 MRN [id number]   [facility] 4SW-A Attending Macrina Antonio,*   Hosp Day # 15 PCP Roque Gan MD     Summary: Jeremiah Raya flextouch 6 Units, 6 Units, Subcutaneous, Q12H ARIELLE  •  hydrALAzine HCl (APRESOLINE) injection 10 mg, 10 mg, Intravenous, Q4H PRN  •  Insulin Aspart Pen (NOVOLOG) 100 UNIT/ML flexpen 1-20 Units, 1-20 Units, Subcutaneous, Q6H  •  famoTIDine (PEPCID) tab 20 m PRN **OR** Glucose-Vitamin C (DEX-4) chewable tab 8 tablet, 8 tablet, Oral, Q15 Min PRN  •  Enoxaparin Sodium (LOVENOX) 30 MG/0.3ML injection 30 mg, 0.6 mg/kg, Subcutaneous, Q12H ARIELLE  •  Calcium Carbonate-Vitamin D 250-125 MG-UNIT per tab TABS 2 tablet, 2 ongoing goals of care discussions    Assessment/Recommendations:    Counseling regarding advance care planning and goals of care   -- spoke with pt's son,  Zulmafranckaki William and dtr on conference call.   --reviewed medical history and updated them regarding cu

## 2021-01-05 NOTE — PROGRESS NOTES
[facility] hospitalist daily note  Seen on 1/5/21     S; intubated     Medications in Epic     PE    01/05/21  1002   BP:    Pulse: 93   Resp: 18   Temp:      Seen via glass door to preserve PPE  Spoke with RN and reviewed critical care exam  Gen; intubated  YOSELYN

## 2021-01-05 NOTE — PROGRESS NOTES
01/04/21 2116   Vent Information   $ RT Standby Charge (per 15 min) 1   Ventilator Initiation 12/25/20   Ventilation Day(s) 11   Interface Non-Invasive Long Prongs   Vent Type    Vent -11   Vent Mode VC+   Settings   FiO2 (%) 80 %   Resp Rate amount of secretions through the ETT. No changes made to the vent this shift. Will continue to monitor. Plans:   - Will continue to monitor pt and wean vent settings as tolerated. Continue to monitor.

## 2021-01-05 NOTE — PROGRESS NOTES
Patient remained on ventilator overnight. No RT changes made. No secretions obtained via ETT. Unable to wean FiO2 at this time. Will continue to monitor.       01/05/21 0620   Vent Information   $ RT Standby Charge (per 15 min) 1   Ventilator Initiation 1

## 2021-01-05 NOTE — PROGRESS NOTES
Albert Wilkinson Patient Status:  Inpatient    3/24/1945 MRN [id number]   [facility] 4SW-A Attending Gilford Guy,*   Hosp Day # 13 PCP Wojciech Chester MD   For PPE stewardship, direct bedside visit/exam deferred, and encounter i mg, 10 mg, Intravenous, Q4H PRN    •  Insulin Aspart Pen (NOVOLOG) 100 UNIT/ML flexpen 1-20 Units, 1-20 Units, Subcutaneous, Q6H    •  famoTIDine (PEPCID) tab 20 mg, 20 mg, Oral, BID    Or    •  famoTIDine (PEPCID) injection 20 mg, 20 mg, Intravenous, BID C (DEX-4) chewable tab 8 tablet, 8 tablet, Oral, Q15 Min PRN    •  Enoxaparin Sodium (LOVENOX) 30 MG/0.3ML injection 30 mg, 0.6 mg/kg, Subcutaneous, Q12H [facility]    •  Calcium Carbonate-Vitamin D 250-125 MG-UNIT per tab TABS 2 tablet, 2 tablet, Oral, BID    •

## 2021-01-05 NOTE — PLAN OF CARE
Assumed care of patient at 0730; remains intubated to ventilator support; sedated with Propofol and Fentanyl. Copious secretions orally and via ETT requiring frequent suction. VSS. Slight temp of 100.2, PRN tylenol given with good result.  Tolerating tube f

## 2021-01-06 NOTE — PLAN OF CARE
Received pt sedated on propofol and fentanyl on ventilator. Withdraws to pain x3 extremities, does not withdraw in LUE. Contracted in the LUE. Unable to follow commands. Around 0030 pt began fighting ventilator, PRN fentanyl bolus given per order.  OG intac

## 2021-01-06 NOTE — PROGRESS NOTES
Received pt on VC+ 14/400/90%/+5. FiO2 increased to 100%. Pt saturating low 90's. No changes made. Will continue to monitor closely.       01/06/21 0426   Vent Information   $ RT Standby Charge (per 15 min) 1  (vent check)   Ventilator Initiation 12/25/20

## 2021-01-06 NOTE — PROGRESS NOTES
[facility]                INFECTIOUS DISEASE PROGRESS NOTE    Edna Real Patient Status:  Inpatient    3/24/1945 MRN [id number]   [facility] 4SW-A Attending Guicho Savage MD   [facility] Day # 15 PCP Stewart Cervantes MD Growth 5 Days N/A   2. URINE CULTURE, ROUTINE     Status: Abnormal    Collection Time: 12/21/20  5:38 PM    Specimen: Urine, clean catch   Result Value Ref Range    Urine Culture >100,000 CFU/ML Aerococcus urinae (A) N/A           Problem list reviewed:  P

## 2021-01-06 NOTE — PROGRESS NOTES
[id number] [facility] 149 Follow Up    Mack Desiree Patient Status:  Inpatient    3/24/1945 MRN [id number]   [facility] 4SW-A Attending Catrachito Thompson Room Day # 16 PCP MD Lisa Ledezma (Order-Specific), Intravenous, Continuous  •  cholecalciferol (VITAMIN D3) cap/tab 2,000 Units, 2,000 Units, Oral, Daily  •  fentaNYL citrate (SUBLIMAZE) 0.05 MG/ML injection 25 mcg, 25 mcg, Intravenous, Q30 Min PRN **OR** fentaNYL citrate (SUBLIMAZE) 0.05 mg, 500 mg, Oral, Daily  No current outpatient medications on file.       Hematology:  Lab Results   Component Value Date    WBC 9.7 01/06/2021    HGB 7.5 (L) 01/06/2021    HCT 23.8 (L) 01/06/2021    .0 01/06/2021       Coags:  Lab Results   Componen following:direct face to face contact, history taking, physical examination, and >50% was spent counseling and coordinating care.     After review of medical chart,  history, physical, diagnosis and treatment plans, pt  is palliative care appropriate    I w

## 2021-01-06 NOTE — PROGRESS NOTES
[facility] hospitalist daily note  Seen on 1/6/21     S; intubated     Medications in Epic     PE  /55  Seen via glass door to preserve PPE  Spoke with RN and reviewed critical care exam  Gen; intubated  HEENT; vent  CV regular on the monitor   Pulm Vent

## 2021-01-06 NOTE — PROGRESS NOTES
[facility] Patient Status:  Inpatient    3/24/1945 MRN [id number]   [facility] 4SW-A Attending Jay Dailey,*   Hosp Day # 12 PCP Shirl Collet, MD     Critical Care Progress Note     Date of Admission: 15 magnesium hydroxide (MILK OF MAGNESIA) 400 MG/5ML suspension 30 mL, 30 mL, Oral, Daily PRN  •  bisacodyl (DULCOLAX) rectal suppository 10 mg, 10 mg, Rectal, Daily PRN  •  Fleet Enema (FLEET) 7-19 GM/118ML enema 133 mL, 1 enema, Rectal, Once PRN  •  Chlorhe crackles                           Chest wall: No tenderness or deformity.                         WBODW: RLA                          Abdomen: soft, non-tender, non-distended, positive BS.                         Extremity: No clubbing or cyanosis.  + ed 12/25  - continue present vent settings, wean Fio2 as able. Not PEEP responsive  - CXR with bilateral opacities  - lasix PRN, will redose today   - weaning trials if/when oxygenation improves  - aggressive pulmonary toilet.    - antibiotics/covid treatment

## 2021-01-06 NOTE — PLAN OF CARE
Assumed care of patient at 0730; remains intubated to vent support. Sedated; contracted on Left; response to painful stimuli other extremities; open eyes partially to pain. Does not follow command.  Some episodes of unprovoked desaturation requiring an in

## 2021-01-06 NOTE — PLAN OF CARE
Received care of patient intubated and sedated on 100% fiO2. Withdraws from pain, but does not follow commands. Pupils equal and reactive. NSR/ ST on tele. VSS. Fentanyl infusing as ordered. Turn Q2 hr. Gabriel draining to gravity. Wound dressing C/D/I.  Tole

## 2021-01-07 NOTE — PROGRESS NOTES
[facility] Patient Status:  Inpatient    3/24/1945 MRN [id number]   [facility] 4SW-A Attending Selina Parker,*   [facility] Day # 16 PCP Shila Pang MD     Critical Care Progress Note     Date of Admission: 15 magnesium hydroxide (MILK OF MAGNESIA) 400 MG/5ML suspension 30 mL, 30 mL, Oral, Daily PRN  •  bisacodyl (DULCOLAX) rectal suppository 10 mg, 10 mg, Rectal, Daily PRN  •  Fleet Enema (FLEET) 7-19 GM/118ML enema 133 mL, 1 enema, Rectal, Once PRN  •  Chlorhe crackles                           Chest wall: No tenderness or deformity.                         FGHUT: IJY                          Abdomen: soft, non-tender, non-distended, positive BS.                         Extremity: No clubbing or cyanosis.  + ed 12/25  - continue present vent settings, wean Fio2 as able. - CXR with bilateral opacities  - lasix PRN  - aggressive pulmonary toilet.    - antibiotics/covid treatments as below  - pt has not made progress towards extubation, continues to require signif

## 2021-01-07 NOTE — PROGRESS NOTES
Discussed patient with Dr. Little Zayas. Patient may be discontinued from [facility] isolation and placed on standard precautions with surgical mask and goggles. Patient may also have one care partner at this time.       FRANCIS Olmedo  Critical Care NP  Phone

## 2021-01-07 NOTE — PROGRESS NOTES
Chart check:     Hyperglyemic over the last 24 hrs, will increase basal insulin dose slightly to 7 units Q 12 hrs, continue current ISF of 25, target 140, every 6 hrs

## 2021-01-07 NOTE — PLAN OF CARE
1/6/21 2000 Patient on full vent support. Has fentanyl and propofol drips for sedation. No signs of distress. Grimaces and withdraws to pain but does not follow any command. She does partially open her eyes with stimulation but no tracking.  noted  She is S ELECTROLYTES - ADULT  Goal: Glucose maintained within prescribed range  Description: INTERVENTIONS:  - Monitor Blood Glucose as ordered  - Assess for signs and symptoms of hyperglycemia and hypoglycemia  - Administer ordered medications to maintain glucose administer replacement therapy as ordered  Outcome: Progressing     Problem: SKIN/TISSUE INTEGRITY - ADULT  Goal: Incision(s), wounds(s) or drain site(s) healing without S/S of infection  Description: INTERVENTIONS:  - Assess and document risk factors for i.e. lights off, TV off, minimize noise and interruptions  - Encourage family to assist in orientation and promotion of home routines  1/6/2021 2347 by Adrian Avendano, RN  Outcome: Not Progressing     Problem: MUSCULOSKELETAL - ADULT  Goal: Return mobili

## 2021-01-07 NOTE — DIETARY NOTE
[facility] UP ASSESSMENT    Pt does not meet malnutrition criteria at this time.     NUTRITION DIAGNOSIS/PROBLEM:  Inadequate energy intake related to decreased ability to consume sufficient energy intake as evidenced by current vent/ provides: 180 kcal. TF at goal rate of 40 ml/hr. No tolerance issues noted per RN documentation. 12/22 - 78 y/o female from NH admitted with acute cystitis, sepsis d/t UTI, and PNA d/t COVID-19. Received nutrition consult for tube feeding recs.  Pt intuba integrity - (ongoing)  3.  Wt maintenance: +/-1-2 lb throughout length of stay - (ongoing)    MEDICATIONS: Propofol, Fentanyl, KCl 20 mEq, Insulin, Vit C, Calcium-Vit D, Vit D3, Zinc, Colace, Pepcid, Lasix    LABS: Glu:189, K+:4.5, Na++ 132    Pt is at mode

## 2021-01-07 NOTE — PROGRESS NOTES
[facility]                INFECTIOUS DISEASE PROGRESS NOTE    Albert Wilkinson Patient Status:  Inpatient    3/24/1945 MRN [id number]   [facility] 4SW-A Attending Carmen Evans MD   [facility] Day # 16 PCP Wojciech Chester MD N/A   2. URINE CULTURE, ROUTINE     Status: Abnormal    Collection Time: 12/21/20  5:38 PM    Specimen: Urine, clean catch   Result Value Ref Range    Urine Culture >100,000 CFU/ML Aerococcus urinae (A) N/A           Problem list reviewed:  Patient Active

## 2021-01-07 NOTE — PROGRESS NOTES
Received patient on full vent support on below settings. Plateau between 07-36 throughout the night. PEEP setting changed overnight from +5 to +7 per Elzie Console APN due to desaturations to 86-88%. Patient's current saturations 92%.  Suctioning moderate-large am

## 2021-01-08 NOTE — PROGRESS NOTES
[facility]  Progress Note    Jeremiah Raya Patient Status:  Inpatient    3/24/1945 MRN [id number]   [facility] 4SW-A Attending Luis F Vargas, DO   Hosp Day # 25 PCP Roque Gan MD     Assessment/Plan:  Patient Active Problem endocrinology in case TFs are held or discontinued. Currently at 40 cc/hr (goal) of Vital.  Plan to remove from vent later today. Nursing to notify endo about TF. No plan for dose changes for now due to intent for palliative removal of vent.     2. Hypoth 01/06/21  0457 01/07/21  0547 01/07/21  0547 01/08/21  0419 01/08/21  0541     --  132*  --  132*  --    K 3.2*  --  4.5  --  3.8  --    CL 98  --  97*  --  96*  --    CO2 33.0*  --  31.0  --  31.0  --    BUN 14  --  20*  --  27*  --    CREATSERUM 0.

## 2021-01-08 NOTE — PROGRESS NOTES
01/08/21 1050   Clinical Encounter Type   Visited With Patient and family together;Health care provider  (Spouse and son present)   Routine Visit   (Responded to page)   Referral From Nurse;Family   Referral To [place]

## 2021-01-08 NOTE — PLAN OF CARE
Received pt sedated, unable to follow commands, withdraws from painful stimuli. L upper extremity contracture. Propofol and fentanyl per [facility] for sedation. #7.5 ETT in place with FiO2 90%. Diminished breath sounds with occasional rhonci.  Moderate amount of

## 2021-01-08 NOTE — PROGRESS NOTES
[facility] hospitalist daily note  Seen on 1/8/21     S; Pt seen and examined. Extubated today with comfort measures.   Resting comfortably,  and son at bedside.       Medications in Epic     /52   Pulse 101   Temp 99.6 °F (37.6 °C) (Temporal)   Resp checked on 12/28  And no sig aneurysm, rupture or hyperacute bleeding.     Preventative lovenox ordered  Dispo: inpt care in the ICU. Plan of care d/w family who agrees.      Tosha Paul DO  [facility] hospitalist  [phone]

## 2021-01-08 NOTE — PROGRESS NOTES
01/07/21 2136   Vent Information   $ RT Standby Charge (per 15 min) 1   Ventilator Initiation 12/25/20   Ventilation Day(s) 13   Interface Invasive   Vent Type    Vent -11   Vent Mode VC+   Settings   FiO2 (%) 90 %   Resp Rate (Set) 14   Vt ( Patient was admitted for intractable epilepsy on 10/26/2020 and underwent SILVIA treatment on 10/26/2020 without perioperative complications. The patient was kept on appropriate DVT prophylaxis during the course of admission. At the time of discharge, the patient was tolerating PO intake without N/V, dysphagia, denied bowel or bladder dysfunction, denied new neurological symptoms, and reported pain controlled with current regimen. The surgical site was without evidence of drainage, breakdown or infection and all sutures were intact. Therapy recommendations of antiepileptic drugs have been arranged and the patient will follow up in clinic as indicated in discharge instructions. All questions were answered and continued treatment/woundcare instructions were discussed in detail prior to discharge.   through the ETT. O2 weaned to 90%, no further changes made this shift. Will continue to monitor. Plans:   - Will continue to monitor pt and wean vent settings as tolerated. Continue to monitor.

## 2021-01-08 NOTE — RESPIRATORY THERAPY NOTE
Received pt vented, VC+ 14/400/90%/+7, tolerating well. No changes made. Will continue to monitor closely.

## 2021-01-08 NOTE — PROGRESS NOTES
[id number] [facility] 149 Follow Up    Gavi Jimenez Patient Status:  Inpatient    3/24/1945 MRN [id number]   [facility] 4SW-A Attending Priya Esquivel, DO   Hosp Day # 25 PCP Fabienne Rodríguez MD     Subjective:   Gavi Jimenez 01/08/2021    CL 96 (L) 01/08/2021    CO2 31.0 01/08/2021     (H) 01/08/2021    CA 8.1 (L) 01/08/2021    ALB 1.3 (L) 01/08/2021    ALKPHO 85 01/08/2021    BILT 0.4 01/08/2021    TP 6.8 01/08/2021    AST 17 01/08/2021    ALT 8 (L) 01/08/2021    DDIME MARLO Keane 83 [id number] [place] [phone]

## 2021-01-08 NOTE — PLAN OF CARE
Pt on ventilator, only responds to noxious stimuli. Meds given and pt suctioned, copious oral secretions, cleaned and repositioned.    Family here at 57 palliative care doctor, APN and pastoral care all here to talk to family and then pt was extubated at

## 2021-01-09 NOTE — PROGRESS NOTES
One [facility] Patient Status:  Inpatient    3/24/1945 MRN [id number]   [facility] 4SW-A Attending Kota Ny,*   [facility] Day # 25 PCP Sandra Whitehead MD     Critical Care Progress Note     Date of Admission: 15 Output 600 ml   Net 969.55 ml        Physical Exam:                          MEXBQGG: QNRHYSOQL, sedated                          MTEHB: ETT in place                          Lungs: Clear to auscultation bilaterally, no wheezes or crackles                interpretation except where noted. ASSESSMENT/PLAN:  1. Acute hypoxemic respiratory failure - intubated 12/21 secondary to COVID pneumonia and bacterial pneumonia, poor pulmonary toilet.  CTA neg for PE 12/21. Extubated 12/24, re-intubated 12/25  - extub

## 2021-01-09 NOTE — PLAN OF CARE
Pt  at 27. Post mortem care completed. Physicians notified. Family at bedside and  home selected. [facility] notified of time of death. Not a candidate for organ, tissue, or eye donation. [place] [facility]

## 2021-01-09 NOTE — DISCHARGE SUMMARY
General Medicine Discharge Summary     Patient ID:  Leah Pierre  77year old  3/24/1945    Admit date: 12/21/2020    Discharge date and time: 1/8/2021  7:05 PM     Attending Physician: Rikki Curtis

## 2021-05-12 NOTE — TELEPHONE ENCOUNTER
Paperwork faxed with OV notes. Confirmation received. RN called and spoke with pt. Pt states that the Sleep study office did give her the masks and hoses she needs for a CPAP, but not the machine.   She is contacting the office to see if our office is supposed to order this, or if the sleep study is going to order it.   She will notify office when she finds out.

## 2021-06-02 NOTE — PROGRESS NOTES
Desaturated during repositioning/turning, tachypniec and grimacing, gave Fentanyl IV bolus as PRN, sell tolerated, DIO2 has to be increased to 100% FIO2 and decreased to 90% once stable. Dapsone Pregnancy And Lactation Text: This medication is Pregnancy Category C and is not considered safe during pregnancy or breast feeding.

## 2021-12-09 NOTE — PLAN OF CARE
CARDIOVASCULAR - ADULT    • Maintains optimal cardiac output and hemodynamic stability Progressing        HEMATOLOGIC - ADULT    • Maintains hematologic stability Progressing        Impaired Functional Mobility    • Achieve highest/safest level of mobility SUBJECTIVE:  Analilia Montenegro is a 54 year old female who presents for a complete physical.       GYNECOLOGICAL HISTORY:  Menses:  Patient's last menstrual period was 2019 (approximate).  Denies any vaginal bleeding since.   Menopause symptoms:  Occasional hot flashes.  Hormone replacement therapy:   None  :  0  Para:  0   Birth control:  Postmenopausal   Trying to get pregnant:  No  Recent partner changes:  Denied   Sexually transmitted disease testing:  Declined     Last Pap smear:  2019   History of abnormal pap smears:  Denied     Last Mammogram:  2021    History of abnormal mammograms:  Denied     Last Bone Density:  None prior    Result:  Not applicable      RISK REVIEW:  CHOLESTEROL (mg/dL)   Date Value   2020 239 (H)     HDL (mg/dL)   Date Value   2020 79 (H)     TRIGLYCERIDE (mg/dL)   Date Value   2020 128     CALCULATED LDL (mg/dL)   Date Value   2020 134 (H)     CHOL/HDL (no units)   Date Value   2020 3.0       The 10-year ASCVD risk score (Livingstonsheldon MERCEDES Jr., et al., 2013) is: 1.2%    Values used to calculate the score:      Age: 54 years      Sex: Female      Is Non- : No      Diabetic: No      Tobacco smoker: No      Systolic Blood Pressure: 122 mmHg      Is BP treated: No      HDL Cholesterol: 79 mg/dL      Total Cholesterol: 239 mg/dL    Glucose (mg/dL)   Date Value   2021 100 (H)     Hemoglobin A1C (%)   Date Value   2020 5.6       TSH (mcUnits/mL)   Date Value   2021 5.556 (H)         Immunization History   Administered Date(s) Administered   • COVID-19 Moderna 0.5 mL Vaccine 2021, 2021, 2021   • Influenza, injectable, MDCK, preservative free, quadrivalent 10/29/2019   • Influenza, injectable, quadrivalent 10/25/2018   • Influenza, injectable, quadrivalent, preservative-free 10/26/2018, 10/20/2020   • Influenza, seasonal, injectable, trivalent 2009, 10/11/2010   • Novel Influenza A3C3-36,  Unspecified Formulation 12/14/2009   • Tdap 01/01/2006, 01/11/2016     Pneumococcal:  Not applicable --denies indications for early administration.  Shingles:  Patient was provided with a printed prescription for the Shingrix vaccine to take to a local pharmacy and determine insurance coverage.        Colorectal Cancer Screening:  Last colonoscopy updated on 08/08/2016 -- she was instructed to repeat it in 5 years -- OPEN ACCESS COLONOSCOPY ordered previously placed, patient provided with the General Surgery phone number to schedule an appointment         SOCIAL HISTORY:  Social History     Tobacco Use   • Smoking status: Never Smoker   • Smokeless tobacco: Never Used   Vaping Use   • Vaping Use: never used   Substance Use Topics   • Alcohol use: Yes     Alcohol/week: 0.0 standard drinks     Comment: Once a month   • Drug use: Never         PAST MEDICAL HISTORY:  Past Medical History:   Diagnosis Date   • Colon polyps 02/09/2015   • Depression    • Hypothyroidism 10/28/2016   • Impaired fasting blood sugar    • Kidney stones    • Paroxysmal atrial fibrillation (CMS/HCC) 2016    Consulted with Dr. Hurtado in cardiology on 11/1/2016         PAST SURGICAL HISTORY:  Past Surgical History:   Procedure Laterality Date   • Colonoscopy  08/08/2016    Repeat in 5 years, Dr Coello   • Colonoscopy  12/23/2009    tubular adenoma, Dr. Ware   • Skin biopsy  2000    non cancerous growth removed from back   • Vagina surgery      expand vaginal introtius         FAMILY HISTORY:  Family History   Problem Relation Age of Onset   • Hypertension Mother 73   • Stroke Mother    • Depression Mother    • Hyperlipidemia Mother    • High cholesterol Father    • Dementia/Alzheimers Father 75   • Stroke/TIA Father    • Thyroid Sister    • Allergic Rhinitis Sister    • Scoliosis Sister    • Allergic Rhinitis Brother    • Parkinsonism Maternal Grandmother    • Cancer, Colon Maternal Grandfather    • Cancer Paternal Grandmother    •  Dementia/Alzheimers Paternal Grandfather    • Cancer, Breast Maternal Aunt    • Cancer, Breast Maternal Aunt    • Cancer, Ovarian Paternal Aunt 58         MEDICATIONS:  Outpatient Medications Prior to Visit   Medication Sig Dispense Refill   • aspirin 325 MG tablet Take 1 tablet by mouth daily. 30 tablet 0   • cholecalciferol (VITAMIN D3) 1000 UNITS tablet Take 5,000 Units by mouth daily.      • Multiple Vitamins-Minerals (MULTIVITAMIN PO) Take 1 tablet by mouth daily.     • Ascorbic Acid (VITAMIN C) 500 MG tablet Take 1,000 mg by mouth daily.     • sertraline (ZOLOFT) 100 MG tablet Take 1 tablet by mouth daily. 90 tablet 1   • levothyroxine 75 MCG tablet Take 1 tablet by mouth daily. 90 tablet 1     No facility-administered medications prior to visit.         ALLERGIES:  ALLERGIES:   Allergen Reactions   • Codeine Nausea & Vomiting   • Morphine          REVIEW OF SYSTEMS:  --Yellow ROS form reviewed--  HEAD:  The patient denies episodes of lightheadedness, dizziness, syncope or headaches.  EYES:  Does wear corrective lenses.  Denies acute visual changes, but does report new onset floaters.  She is scheduled to see her eye doctor this Friday.   EARS:  Denies tinnitus, pain, pressure or drainage from the ears.  Denies recent changes in hearing.  NOSE:  Denies current nasal/sinus congestion.  OROPHARYNX:  Sees the dentist every 6 months.   Denies current problems with dentition.  Denies pharyngitis or dysphagia.  CARDIOVASCULAR AND RESPIRATORY:  Has a history of atrial fibrillation.  Still reports intermittent heart palpitations.  Was told at her last cardiology consult in 2016 that no treatment was indicated, but now that it has been 5+ years is in agreement to consult with cardiology again -- CT coronary calcium artery score ordered and a cardiology referral was placed.  Denies chest pain, chest pressure, shortness of breath, dyspnea on exertion or wheezing.  GASTROINTESTINAL AND RECTAL:  Denies heartburn, reflux,  nausea, vomiting, diarrhea, constipation, melena or abdominal pain.   URINARY:  Denies dysuria, urinary urgency or frequency.  Denies urinary incontinence.    GENITAL:   Denies vaginal dryness, itching or odor. Questions whether her groin abscess is recurring -- is aware of the need to follow with General Surgery to discuss excision if it is.   Denies dyspareunia.  Denies history of sexually transmitted disease.  Denies concerning breast masses, skin dimpling or nipple discharge.  MUSCULOSKELETAL:  Denies arthralgias or myalgias.  Denies decreased strength, sensation or range of motion in the extremities.  PERIPHERAL VASCULAR:  Denies peripheral edema.  INTEGUMENT:  Denies concerning skin rashes, lesions or moles.  PHYSIOLOGICAL:  Moods have been stable.  Denies excessive fatigue or insomnia.    Recent PHQ 2/9 Score    PHQ 2:  Date Adult PHQ 2 Score Adult PHQ 2 Interpretation   12/8/2021 0 No further screening needed     DEPRESSION ASSESSMENT/PLAN:  o Start and/or continue medication.  See orders for details.         OBJECTIVE:  Visit Vitals  /84 (BP Location: LUE - Left upper extremity, Patient Position: Sitting, Cuff Size: Large Adult)   Pulse 64   Resp 16   Ht 5' 2\" (1.575 m)   Wt 88.3 kg (194 lb 12.4 oz)   LMP 11/04/2019 (Approximate)   BMI 35.63 kg/m²     GENERAL:  The patient is alert and grossly oriented x3.  Appears in no acute distress, well hydrated, and well nourished.  Answers all questions appropriately.  HEENT:  Head normocephalic, atraumatic.    Ears:  External auditory canals free of cerumen.   Tympanic membranes visualized and within normal limits.  All landmarks present.     Eyes:  Pupils equal, round and reactive to light.  Extraocular eye movements intact.  No scleral icterus.  No conjunctival injection.   Nose:  Nares patent.  Turbinates moist and pink.  Septum midline.    Mouth:  Oropharynx clear of erythema, exudate and posterior nasal drip.  No lesions noted in the oral mucosa.   Dentition okay.  NECK:  Supple without anterior or posterior cervical adenopathy.  No supraclavicular nodes palpable.  No tenderness over the mastoid areas.  No palpable thyromegaly.  Full range of motion and +5 strength to flexion, extension and lateral rotation.  HEART:  Regular rate and rhythm.  Normal S1, S2.  No murmurs appreciated.  LUNGS:  Clear to auscultation bilaterally.  No rhonchi or rales are heard.  Respirations unlabored.  UPPER EXTREMITIES:  Without edema, +2 radial pulses present bilaterally.  Sensation intact to distal aspect of digits bilaterally.  Full range of motion and +5 strength in shoulders, elbows, wrists and digits.  BACK:  No costovertebral angle tenderness.  ABDOMEN:  Soft, nontender, nondistended.  Active bowel sounds in all four quadrants.  No palpable masses or hepatosplenomegaly.  Exam slightly compromised by body habitus.  No guarding or rebound appreciated on exam.  LOWER EXTREMITIES:  Without edema, +2 dorsalis pedis pulses present bilaterally.  Sensation intact to distal aspects of digits bilaterally.  Full range of motion and +5 strength in hips, knees, ankles and digits.  INTEGUMENT:  Warm, dry and healthy in appearance throughout the above portions of the examination without significant rashes, lesions or bruising.      ORDERS:  Pap:  Due in 2024 sooner with GYN issues/concerns   Sexually transmitted disease testing:  Declined   Additional Labs:  Recent TSH level reviewed   Imaging:  None  Immunizations:  Patient was provided with a printed prescription for the Shingrix vaccine to take to a local pharmacy and determine insurance coverage.   Referrals:  Cardiology       ASSESSMENT:  1. Well woman exam without gynecological exam    2. Hypothyroidism due to Hashimoto's thyroiditis    3. History of atrial fibrillation    4. Palpitations    5. Screening for cardiovascular condition    6. Elevated fasting glucose          PLAN:  Well woman exam without gynecological exam  (primary  encounter diagnosis)  Recommendations:  -- Healthy diet; high in fiber and fresh fruit/vegetables, lean cuts of meat, and avoidance of high processed foods  -- 150 minutes of exercise per week  -- Daily multivitamin  -- Adequate calcium intake (600 mg BID)  -- Adequate vitamin D intake  (2000 IU BID)  -- Annual eye exams  -- Every 6 months dental  -- Monthly self-breast exams and immediate followup with concerns/abnormalities  -- Annual physical so we can keep preventative care up-to-date      Hypothyroidism due to Hashimoto's thyroiditis  Comment: TSH elevated   Plan: Levothyroxine dose increased from 75 to 88 mcg tab PO QD, new prescription faxed to pharmacy, patient will repeat a THYROID STIMULATING HORMONE REFLEX in 3 and 6 months     History of atrial fibrillation  Comment: Last consulted with Dr. Hurtado in cardiology in 2016  Palpitations  Comment: Still experiences intermittent heart palpitations  Screening for cardiovascular condition  Comment: CT CARDIAC CALCIUM SCORING - CASH SERVICE  Plan:  We will call with CT results once available and pursue further work-up/treatment as indicated, a SERVICE TO CARDIOLOGY referral was placed, concerning signs/symptoms warranting the need for immediate further medical evaluation were stressed     Elevated fasting glucose  Plan: GLYCOHEMOGLOBIN ordered       Return in about 6 months (around 6/8/2022), or if symptoms worsen or fail to improve, for Depression, Thyroid, A.Fib.      Patient agreed to monitor condition closely and call or return to the clinic or local emergency room immediately with problems.     Patient verbally agreed an understanding of the above assessment and plan and is instructed to call with questions, comments, or concerns that arise in the interim.      Supervising Physician:  Dr. Ramsey Stone MD

## 2025-02-02 NOTE — PROGRESS NOTES
[facility] Hospitalist Team  Progress Note      Abimael Vickers  3/24/1945    Assessment/Plan:       # R knee pain - suspect injury  -knee/hip xr no fx  -ortho saw pt, no interventions planned, wbat  -PT eval, recommending JUAN JOSE  -MARITZA consulted    #DM1  -reviewed e Subcutaneous TID AC and HS   • docusate sodium  100 mg Oral BID   • PEG 3350  17 g Oral Daily   • baclofen  20 mg Oral TID   • Donepezil HCl  10 mg Oral Nightly   • gabapentin  100 mg Oral BID   • Levothyroxine Sodium  75 mcg Oral QAM AC   • lisinopril  2. oral

## (undated) DEVICE — GLOVE BIOGEL M SURG SZ 8

## (undated) DEVICE — PIN FX 30.5CM 3MM NTR NL

## (undated) DEVICE — PADDING CAST COTTON  4

## (undated) DEVICE — KENDALL SCD EXPRESS SLEEVES, KNEE LENGTH, MEDIUM: Brand: KENDALL SCD

## (undated) DEVICE — SUTURE VICRYL 0 CP-1

## (undated) DEVICE — GLOVE SURG TRIUMPH SZ 71/2

## (undated) DEVICE — STANDARD HYPODERMIC NEEDLE,POLYPROPYLENE HUB: Brand: MONOJECT

## (undated) DEVICE — SPLINT PRECUT SYNTH 4X30

## (undated) DEVICE — UPPER EXTREMITY CDS-LF: Brand: MEDLINE INDUSTRIES, INC.

## (undated) DEVICE — GOWN SURG AERO CHROME XXL

## (undated) DEVICE — SOL  .9 1000ML BTL

## (undated) DEVICE — SUTURE VICRYL 2-0 CP-1

## (undated) DEVICE — SYRINGE 30ML LL TIP

## (undated) DEVICE — PROXIMATE RH ROTATING HEAD SKIN STAPLERS (35 WIDE) CONTAINS 35 STAINLESS STEEL STAPLES: Brand: PROXIMATE

## (undated) DEVICE — ADHESIVE MASTISOL 2/3CC VL

## (undated) DEVICE — 3M™ STERI-STRIP™ REINFORCED ADHESIVE SKIN CLOSURES, R1547, 1/2 IN X 4 IN (12 MM X 100 MM), 6 STRIPS/ENVELOPE: Brand: 3M™ STERI-STRIP™

## (undated) DEVICE — Device: Brand: BOOT LINER, DISPOSABLE

## (undated) DEVICE — GAUZE SPONGES,12 PLY: Brand: CURITY

## (undated) DEVICE — LAPAROTOMY SPONGE - RF AND X-RAY DETECTABLE PRE-WASHED: Brand: SITUATE

## (undated) DEVICE — ST. TONGUE BLADES: Brand: DEROYAL

## (undated) DEVICE — SUTURE ETHIBOND 5 V-37

## (undated) DEVICE — PIN XTRNFX 508MM 3.2MM NTR NL

## (undated) DEVICE — CONVERTORS STOCKINETTE: Brand: CONVERTORS

## (undated) DEVICE — GUIDEWIRE ORTH 100CM 3MM TIB

## (undated) DEVICE — HIP PINNING CDS: Brand: MEDLINE INDUSTRIES, INC.

## (undated) DEVICE — NON-ADHERENT STRIPS,OIL EMULSION: Brand: CURITY

## (undated) DEVICE — 1010 S-DRAPE TOWEL DRAPE 10/BX: Brand: STERI-DRAPE™

## (undated) DEVICE — DRESSING AQUACEL AG 3.5 X 6

## (undated) DEVICE — DRILL SRG 152.5MM FR  NTR

## (undated) DEVICE — NEEDLE ELECTRODE: Brand: EDGE

## (undated) DEVICE — ABDOMINAL PAD: Brand: DERMACEA

## (undated) DEVICE — SHOULDER SLING

## (undated) DEVICE — DRESSING AQUACEL AG 3.5X3.75

## (undated) DEVICE — GLOVE SURG TRIUMPH SZ 8

## (undated) DEVICE — STOCKING CMPR LG LNG THG LGTH

## (undated) NOTE — IP AVS SNAPSHOT
[address]            (For Outpatient Use Only) Initial Admit Date: 10/8/2020   Inpt/Obs Admit Date: Inpt: N/A / Obs: 10/08/20   Discharge Date:    Goldy Yi:  [de-identified]   MRN: [de-identified]   CSN: [id number]   CEID: [id number] Group Number:  Insurance Type:    Subscriber Name:  Subscriber :    Subscriber ID:  Pt Rel to Subscriber:    Hospital Account Financial Class: Medicare Advantage    2020

## (undated) NOTE — MR AVS SNAPSHOT
[address], [facility] [address] [place] [id number]               Thank you for choosing us for your health care visit with Levar Arango MD.  We are glad to serve you and happy to provide you with this summary ? To best provide you care, patients receiving routine medications need to be seen at least once a year.  protocol for controlled substances:  Written prescriptions      ?  EFFECTIVE April 1, 2017 PATIENTS MUST  THEIR OWN NARCOTIC PRESCRIPT Today's Vital Signs     BP Pulse                112/76 mmHg 84             Current Medications          This list is accurate as of: 6/22/17  1:21 PM.  Always use your most recent med list.                ACCU-CHEK COMPACT PLUS Strp   Generic drug:  Glucos Commonly known as:  SYNTHROID, LEVOTHROID           lisinopril 2.5 MG Tabs   Take 2.5 mg by mouth once daily. Commonly known as:  PRINIVIL,ZESTRIL           NAPROXEN DR OR   Take by mouth. OMEGA 3 OR   Take by mouth.            Pravastatin Sodiu active are less likely to develop some chronic diseases than adults who are inactive.      HOW TO GET STARTED: HOW TO STAY MOTIVATED:   Start activities slowly and build up over time Do what you like   Get your heart pumping – brisk walking, biking, swimmin

## (undated) NOTE — LETTER
[address] [facility] FOR BLOOD OR BLOOD COMPONENTS      In the course of your treatment, it may become necessary to administer a transfusion of blood or blood components.  This form provides basic information concerning this proc If loss of blood poses serious threats in the course of your treatment, THERE IS NO EFFECTIVE ALTERNATIVE TO BLOOD TRANSFUSION.  However, if you have any further questions on this matter, your physician will fully explain the alternatives to you if it has n

## (undated) NOTE — LETTER
[facility]  Naty Escobar 62 [address] [facility], [address] [place] [address]    Consent for Operation    Date: __________________    Time: _______________    1.  I authorize the performance upon Kale De Jesus the following operation:    Procedure(s):  Right hip intramedu procedure has been videotaped, the surgeon will obtain the original videotape. The hospital will not be responsible for storage or maintenance of this tape.     6. For the purpose of advancing medical education, I consent to the admittance of observers to t STATEMENTS REQUIRING INSERTION OR COMPLETION WERE FILLED IN.     Signature of Patient:   ___________________________    When the patient is a minor or mentally incompetent to give consent:  Signature of person authorized to consent for patient: ____________ supplements, and pills I can buy without a prescription (including street drugs/illegal medications). Failure to inform my anesthesiologist about these medicines may increase my risk of anesthetic complications.   · If I am allergic to anything or have had Anesthesiologist Signature     Date   Time  I have discussed the procedure and information above with the patient (or patient’s representative) and answered their questions. The patient or their representative has agreed to have anesthesia services.     ___

## (undated) NOTE — IP AVS SNAPSHOT
Patient Demographics     Address  [address] [place] [address] Phone  [phone] (Home) *Preferred*  [phone] [facility])      Emergency Contact(s)     Name Relation Home Work Mobile    Glen Spouse [address] Next dose due: Today at 6 pm and 9 pm      Take 2 tablets (20 mg total) by mouth 4 (four) times daily.    Nataliia Bush MD         BD PEN NEEDLE SHORT U/F 31G X 8 MM Misc  Generic drug:  Insulin Pen Needle      USE TO INJECT INSULIN 4-5 TIMES A DAY AS DIRECTED Inject 7 Units into the skin 2 (two) times daily. Jam Angel MD         Levothyroxine Sodium 75 MCG Tabs  Commonly known as:  SYNTHROID, LEVOTHROID  Next dose due:   Tomorrow  Morning 12/6      Take 75 mcg by mouth before breafast.          lisinopri [id number] baclofen (LIORESAL) tab 20 mg 12/04/17 1818 Given      [id number] baclofen (LIORESAL) tab 20 mg 12/04/17 2052 Given      [id number] baclofen (LIORESAL) tab 20 mg 12/05/17 0847 Given      [id number] baclofen (LIORESAL) tab 20 mg 12/05/17 1254 Given Flowsheet Row Most Recent Value   Vitals   152/124 Filed at 12/05/2017 1203   Pulse  85 Filed at 12/05/2017 1202   Resp  18 Filed at 12/05/2017 1202   Temp  97.9 °F (36.6 °C) Filed at 12/05/2017 1202   SpO2  95 % Filed at 12/05/2017 3283      Patient's Mos MRSA/SA PCR RESULT Negative for MRSA  and Staph aureus by PCR         H&P - H&P Note      H&P signed by Thomas Renee MD at 12/2/2017  3:16 PM  Version 3 of 3    Author:  Thomas Renee MD Service:  (none) Author Type:  Physician    Filed:  12/2/2017 Medications:[JM.1]    No current facility-administered medications on file prior to encounter. Current Outpatient Prescriptions on File Prior to Encounter:  TiZANidine HCl 2 MG Oral Tab TAKE ONE TABLET (2 MG) BY MOUTH TWO TIMES DAILY.  Disp: 180 tablet Rf Review of Systems:   A comprehensive 14 point review of systems was completed. Pertinent positives and negatives noted in the HPI.     Physical Exam:[JM.1]    /94   Pulse 68   Temp 98 °F (36.7 °C) (Temporal)   Resp 15   Ht 5' 7\" (1.702 m)   Wt 110 Plan of care discussed with patient and er md Cielo Harvey MD  12/2/2017[JM.1]                        Electronically signed by Sumi Booth MD on 12/2/2017  3:16 PM   Attribution Key    JM. 1 - Sumi Booth MD on 12/2/2017  2:14 PM  GT. 2 - Tamera Comment: Cystoscopy- Dr. Parish Postal  No date: TOTAL ABDOM HYSTERECTOMY[JM.2]    Social History:[JM.1]  reports that she has quit smoking. She has never used smokeless tobacco. She reports that she drinks alcohol. She reports that she does not use drugs. [JM.2] celecoxib (CELEBREX) 200 MG Oral Cap Take 200 mg by mouth daily with breakfast. Disp:  Rfl:    Levothyroxine Sodium (SYNTHROID, LEVOTHROID) 75 MCG Oral Tab Take 75 mcg by mouth before breafast. Disp:  Rfl:    Pravastatin Sodium (PRAVACHOL) 80 MG Oral Tab T Imaging: Imaging data reviewed in Epic. ASSESSMENT / PLAN:     1. Right hip fracture-ortho consult  2. DMII-insulin ordered-monitor sugars  3. Multiple sclerosis-resume muscle relaxants  4. Hypothyroidism-resume replacement  5. Anemia-monitor  6.  Thro complication, not stated as uncontrolled[JM.2]         Past Surgical History:[JM.1] Past Surgical History:  No date: HIP REPLACEMENT SURGERY  shoulder replacement: OTHER Right  2/25/15: OTHER SURGICAL HISTORY      Comment: Cystoscopy- Dr. Anita Baker  No date: TOT insulin glargine (LANTUS) 100 UNIT/ML Subcutaneous Solution Inject 15 Units into the skin 2 (two) times daily.  (Patient taking differently: Inject 14 Units into the skin every morning.  ) Disp: 10 mL Rfl: 0   celecoxib (CELEBREX) 200 MG Oral Cap Take 200 m CrCl cannot be calculated (Patient's most recent lab result is older than the maximum 7 days allowed. ). No results for input(s): PTP, INR in the last 72 hours. No results for input(s): TROP, CK in the last 72 hours. [JM.2]    Imaging: Imaging data rev radiates into the R thigh.     History:  Past Medical History:   Diagnosis Date   • Hypothyroid    • MS (multiple sclerosis) ([facility] [address]    • Scapula fracture 6/2015   • Type I (juvenile type) diabetes mellitus without mention of complication, not stated as uncon cyanosis. R LE - shortened, in ER, tender with motion of the hip, moves toes, sensation intact in the foot, calf NT, skin intact. Pt with spasticity in the R arm, limited ROM of the shoulder. No tenderness or other sign on injury in the other limbs.   Sk Dementia associated with other underlying disease     Closed fracture of head of right femur ([facility] [address]     Type 2 diabetes mellitus without complication (HCC)     Hypothyroidism     S/P right hip fracture     Closed fracture of head of right femur, initial • Type I (juvenile type) diabetes mellitus without mention of complication, not stated as uncontrolled        Past Surgical History  Past Surgical History:  No date: HIP REPLACEMENT SURGERY  shoulder replacement: OTHER Right  2/25/15: OTHER SURGICAL HISTOR Standardized Score (AM-PAC Scale): 23.55   CMS Modifier (G-Code): CN    FUNCTIONAL ABILITY STATUS  Gait Assessment   Gait Assistance: Not tested  Distance (ft): Patient is non ambulator  Assistive Device:  (Not applicable)  Pattern:  (Not applicable)  Stoo training for safe transfers)  Rehab Potential : Guarded  Frequency (Obs): 3x/week   CURRENT GOALS     Goal #1 Patient is able to demonstrate supine - sit EOB @ level: maximum assistance of 1   Goal #2 Patient is able to demonstrate transfers EOB to/from Ch Past Surgical History  Past Surgical History:  No date: HIP REPLACEMENT SURGERY  shoulder replacement: OTHER Right  2/25/15: OTHER SURGICAL HISTORY      Comment: Cystoscopy- Dr. Pari Lopez  No date: TOTAL ABDOM HYSTERECTOMY    HOME SITUATION  Type of Home: House Lower extremity strength is within functional limits except for the following:  Right LE - not tested due to pain & limited ROM. Left LE may be graded as poor.     BALANCE  Static Sitting: Poor +  Dynamic Sitting: Poor -  Static Standing: Not applicable  Dy Exercise/Education Provided:  Bed mobility  Energy conservation  Functional activity tolerated  Transfer training    Patient End of Session: Up in chair;Needs met;Call light within reach;RN aware of session/findings; All patient questions and concerns addre mechanical lift safely. PLAN  PT Treatment Plan: Bed mobility; Endurance; Energy conservation;Patient education; Family education;Transfer training;Balance training (Family training for safe transfers)  Rehab Potential : Guarded  Frequency (Obs): 3x/we Room Number: 357/357-A  Session: 1[BW.1]   Number of Visits to Meet Established Goals: 5    Presenting Problem: s/p R hip IM nailing 12/3/17[BW.2]    History related to current admission: Patient is 71year old female admitted on 12/2/2017 from home follow ACTIVITIES OF DAILY LIVING ASSESSMENT  AM-PAC ‘6-Clicks’ Inpatient Daily Activity Short Form  How much help from another person does the patient currently need…[BW.1]  -   Putting on and taking off regular lower body clothing?: Total  -   Bathing (includin Patient seen for OT services this pm. In this session patient making minimal progress towards OT goals with improvements in balance, endurance and independence in ADL tasks, however patient remains below her baseline in these and other functional areas.  Pa OCCUPATIONAL THERAPY EVALUATION - INPATIENT     Room Number: 357/357-A  Evaluation Date: 12/4/2017  Type of Evaluation: Initial[BW.1]  Presenting Problem: s/p R hip IM nailing 12/3/17[BW.2]    Physician Order: IP Consult to Occupational Therapy  Reason for Patient Regularly Uses: Glasses[BW.2]    Prior Level of Function: Patient reports typically setup assist for feeding, grooming, and sponge bathing (reports hasn't used tub since a fall there \"a long time ago\"); reports mod assist for UB dressing (can do ADDITIONAL TESTS                                    NEUROLOGICAL FINDINGS[BW.1]  Neurological Findings: None[BW.2]                ACTIVITY TOLERANCE[BW.1]   Room air  No shortness of breath[BW.2]    ACTIVITIES OF DAILY LIVING ASSESSMENT  AM-PAC ‘6-Clicks Patient End of Session: Up in chair;Needs met;Call light within reach; All patient questions and concerns addressed;SCDs in place; Ice applied[BW. 2]    ASSESSMENT[BW.1]   Patient is a[BW.3 71year old[BW.4]  female admitted 12/2/2017 for R hip fracture, s/p Patient Complexity  Occupational Profile/Medical History[BW.1] LOW - Brief history including review of medical or therapy records[BW.2]    Specific performance deficits impacting engagement in ADL/IADL[BW.1] LOW  1 - 3 performance deficits[BW.2]    Client Therapist) filed at 12/4/2017  3:52 PM       OCCUPATIONAL THERAPY EVALUATION - INPATIENT     Room Number: 357/357-A  Evaluation Date: 12/4/2017  Type of Evaluation: Initial[BW.1]  Presenting Problem: s/p R hip IM nailing 12/3/17[BW.2]    Physician Order: I Hand Dominance: Right  Drives: No  Patient Regularly Uses: Glasses[BW.2]    Prior Level of Function: Patient reports typically setup assist for feeding, grooming, and sponge bathing (reports hasn't used tub since a fall there \"a long time ago\"); reports WFL[BW. 1] R side, L side limited due to limited ROM[BW.2]    Fine Motor    WFL      ADDITIONAL TESTS                                    NEUROLOGICAL FINDINGS[BW.1]  Neurological Findings: None[BW.2]                ACTIVITY TOLERANCE[BW.1]   Room air  No to patient pain levels 8/10 at rest increasing with movement.[BW.3]   Patient End of Session: Up in chair;Needs met;Call light within reach; All patient questions and concerns addressed;SCDs in place; Ice applied[BW. 2]    ASSESSMENT[BW.1]   Patient is a[BW. 2 benefit from skilled inpatient OT to address the above deficits, maximizing patient’s ability to return safely to her prior level of function.     Patient Complexity  Occupational Profile/Medical History[BW.1] LOW - Brief history including review of medical :  Luis Flynn OT (Occupational Therapist)       Attempted to see pt for OT Eval.  Informed by nurse that pt glucose is in 45s and for therapy to be held for today. Will f/u tomorrow. [MR.1]      Attribution Escalera    MR.1 Amisha Oro OT on 12/3/20

## (undated) NOTE — IP AVS SNAPSHOT
Patient Demographics     Address  [address] [place] [address] Phone  [phone] (Home) *Preferred*  [phone] [facility])      Emergency Contact(s)     Name Relation Home Work Mobile    Glen Spouse [address] [facility] Take 1 tablet by mouth every 6 (six) hours as needed for Pain.   Stop taking on:  12/14/2017   Kylah Darby MD   [    ]   [    ]   [    ]   [    ]     insulin aspart 100 UNIT/ML Soln  Commonly known as:  Rae Estimable      Inject into the skin [address] [id number] 0.9%  NaCl infusion 12/07/17 0560 New Bag      [id number] 0.9%  NaCl infusion 12/07/17 1830 New Bag      [id number] Donepezil HCl (ARICEPT) tab 5 mg 12/07/17 2059 Given      [id number] HYDROcodone-acetaminophen (NORCO) 5-325 MG per tab 1 tablet 12/ [id number] Insulin Aspart Pen (NOVOLOG) 100 UNIT/ML flexpen 1-68 Units 12/08/17 1222 Given      [id number] Insulin Aspart Pen (NOVOLOG) 100 UNIT/ML flexpen 15 Units 12/08/17 0202 Given      [id number] insulin detemir (LEVEMIR) 100 UNIT/ML flextouch 7 Units 1 CBC, PLATELET; NO DIFFERENTIAL [[id number]] (Abnormal)  Resulted: 12/08/17 0639, Result status: Final result   Ordering provider:  Dwayne Goodman DO  12/07/17 2300 Resulting lab:  [facility]    Specimen Information    Type Source Collected On fasting glucose levels since these represent specific points in time. Testing Performed By     Eleonora Bowens Name Director Address Valid Date Range    [facility] LAB Render Cruise  S.  [address] and was feeling more tired and fatigued. Patient denies any fevers, chills, nausea, vomiting, diarrhea, constipation. No chest pain, shortness of breath, dizziness, lightheadedness, or syncope. No numbness or tingling extremities very focal weakness.   Claryce Forth TiZANidine HCl 2 MG Oral Tab TAKE ONE TABLET (2 MG) BY MOUTH TWO TIMES DAILY. Disp: 180 tablet Rfl: 3   gabapentin 100 MG Oral Cap Take 1 capsule (100 mg total) by mouth 2 (two) times daily.  Disp: 180 capsule Rfl: 3   baclofen 10 MG Oral Tab Take 2 tablets rebound, guarding or organomegaly. Neurologic: No focal neurological deficits. CNII-XII grossly intact. Musculoskeletal: Moves all extremities. [RZ.1] Lrft hand and arm contractures. [RZ.2]  Extremities: No edema or cyanosis.   Integument: No rashes or lesi 8. Alzheimer's dementia-we will continue on donepezil. [RZ.2]         Quality:  · DVT Prophylaxis:[RZ.1] Lovenox[RZ. 2]  · CODE status:[RZ.1] Full[RZ. 2]  · Gabriel:[RZ.1] N/A[RZ.2]    Plan of care discussed with[RZ. 1] pt and spouse at bedside. [RZ.2]    Jaguar Villegas Contracture of elbow    Diabetic polyneuropathy associated with type 2 diabetes mellitus ([facility] [address]    Dementia associated with other underlying disease    Type 2 diabetes mellitus without complication (HCC)    Hypothyroidism    Hypotension    Anemia, unspeci Management Techniques:  Body mechanics;Repositioning[RW.2]    COGNITION  · Arousal/Alertness:  appropriate responses to stimuli  · Memory:  appears intact  · Safety Judgement:  good awareness of safety precautions    RANGE OF MOTION AND STRENGTH ASSESSMENT Skilled Therapy Provided: Pt received in semi-supine position in bed. Pt educated on body mechanics for bed mobility transition from supine to sitting at EOB. Pt is dependent for bed mobility transition from supine to sitting at EOB.  Pt required max A to m is below baseline and would benefit from skilled inpatient PT to address the above deficits to assist patient in returning to prior to level of function. [RW.1]  Rec d/c to sub-acute rehab (19-21 days) due to recent R hip procedure w/ IM nailing on 12/5/17. Reason for Therapy: ADL/IADL Dysfunction and Discharge Planning    History related to current admission: Pt admitted 12/7/2017 for Hypotension, unspecified hypotension type [I95.9]  Anemia, unspecified type [D64.9].  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 Subacute rehab is recommended for 19-21 days. After this period of rehabilitation patient should achieve max (A) level in transfers. Pt may require more support long term.

## (undated) NOTE — IP AVS SNAPSHOT
[address]            (For Outpatient Use Only) Initial Admit Date: 12/2/2017   Inpt/Obs Admit Date: Inpt: 12/2/17 / Obs: N/A   Discharge Date:    Davina Reid:  [de-identified]   MRN: [de-identified]   CSN: [id number]        ENCOUNTER  Patient Payor:  Plan:    Group Number:  Insurance Type:    Subscriber Name:  Subscriber :    Subscriber ID:  Pt Rel to Subscriber:    Hospital Account Financial Class: Medicare    2017

## (undated) NOTE — IP AVS SNAPSHOT
Patient Demographics     Address  [address] [place] [address] Phone  [phone] (Home) *Preferred*      Emergency Contact(s)     Name Relation Home Work Mobile    Glen Sarmiento [phone]    Sarahy Najera [phone] Commonly known as: ARICEPT      Take 1 tablet (10 mg total) by mouth nightly. Melani Mendoza MD         Fish Oil 500 MG Caps      Take 500 mg by mouth daily.           FreeStyle Josefina 14 Day Sensor Misc      USE TO MONITOR BLODD GLUCOSE   Odessa Arizmendi MD Commonly known as: ZANAFLEX      Take 1 tablet (2 mg total) by mouth 2 (two) times daily. Filomena Fay MD         Vitamin D3 50 MCG (2000 UT) Caps  Commonly known as: VITAMIN D3      Take 2,000 Units by mouth daily.                 Where to Get Your Med [id number] Insulin Aspart Pen (NOVOLOG) 100 UNIT/ML flexpen 1-40 Units 10/12/20 0828 Given  bg 439    [id number] Insulin Aspart Pen (NOVOLOG) 100 UNIT/ML flexpen 1-40 Units 10/12/20 1206 Given  bg 291    [id number] Insulin Aspart Pen (NOVOLOG) 100 UNIT/ML fl Filed: 10/8/2020  3:37 PM Date of Service: 10/8/2020  3:28 PM Status: Signed    : Cornelius Bell DO (Physician)       [facility] Hospitalist Team  History and Physical       Assessment/Plan:     # R knee pain - suspect injury  -xr personally reviewed, ? Performed by Kristopher Abraham MD at [facility] MAIN OR   • FEMUR FRACTURE SURGERY  12/2017    right   • FEMUR IM NAIL Right 12/2/2017    Performed by Jin Thapa MD at [facility] MAIN OR   • FEMUR IM NAIL RETROGRADE Left 8/18/2013    Performed by Susana Foreman MD a ENDOCRINE:  (-) heat or cold intolerance (-) polyuria (-) polydipsia  HEMATOLOGICAL:  (-) easy bruising (-) bleeding    OBJECTIVE:  /51 (BP Location: Left arm)   Pulse 72   Temp 99.2 °F (37.3 °C) (Oral)   Resp 18   Ht 5' 8\" (1.727 m)   Wt 135 lb (61 PROCEDURE:  XR KNEE (1 OR 2 VIEWS), RIGHT (CPT=73560)  COMPARISON:  [facility] , XR, XR FLUOROSCOPE EXAM >1 HR EXTENSIVE (CPT=76001), 12/02/2017, 6:14 PM.  [facility] , XR, XR FEMUR MIN 2 VIEWS RIGHT (CPT=73552), 12/02/2017, 1:02 PM.  INDICATIONS:  right leg pain Consults signed by Starla Snow MD at 10/8/2020  9:05 PM     Author: Starla Snow MD Service: Orthopedics Author Type: Physician    Filed: 10/8/2020  9:05 PM Date of Service: 10/8/2020  8:51 PM Status: Signed    : Starla Snow MD • Closed subtrochanteric fracture of right femur ([facility] [address]          Global exp 3-2-18 / RIGHT FEMUR / BAM  12/8/2017   • High blood pressure    • High cholesterol    • Hypothyroid    • MS (multiple sclerosis) (HCC)    • Osteoarthritis    • Scapula fracture 6/2 •  Donepezil HCl (ARICEPT) tab 10 mg, 10 mg, Oral, Nightly    •  gabapentin (NEURONTIN) cap 100 mg, 100 mg, Oral, BID    •  HYDROcodone-acetaminophen (NORCO) 5-325 MG per tab 1 tablet, 1 tablet, Oral, Q6H PRN    •  Levothyroxine Sodium tab 75 mcg, 75 mcg, •  baclofen 20 MG Oral Tab, Take 1 tablet (20 mg total) by mouth 3 (three) times daily.     •  LEVOTHYROXINE SODIUM 75 MCG Oral Tab, Take 1 tablet by mouth before breakfast.    •  Continuous Blood Gluc Sensor (EdgeSpringYLE SANDY 14 DAY SENSOR) Does not apply M Oxybutynin                  Comment:Dry mouth,and trouble with bowel movements[WD.2]    Review of Systems:      Constitutional:  Denies fever, chills or weight loss   Allergies: As described in allergies  HEENT:  Denies sore throat or acute eye problems Component Value Date    WBC 6.0 10/08/2020    HGB 8.9 (L) 10/08/2020    HCT 28.8 (L) 10/08/2020    .0 10/08/2020    CREATSERUM 0.89 10/08/2020    BUN 35 (H) 10/08/2020     10/08/2020    K 4.2 10/08/2020     10/08/2020    CO2 26.0 10/08/2 Discharge Summary - D/C Summary      Discharge Summary signed by Maria Fernanda Thapa DO at 10/12/2020  1:31 PM  Version 1 of 1    Author: Maria Fernanda Thapa DO Service: Hospitalist Author Type: Physician    Filed: 10/12/2020  1:31 PM Date of Service: 10/12/20 IP CONSULT TO OCCUPATIONAL THERAPY  IP CONSULT TO ORTHOPEDIC SURGERY  IP CONSULT TO ENDOCRINOLOGY    Operative Procedures:        Patient instructions:      Current Discharge Medication List    CONTINUE these medications which have NOT CHANGED    Vitamin D insulin aspart 100 UNIT/ML Subcutaneous Solution  Inject into the skin See Admin Instructions.  SS- 150-200=1units, 201-250=2units, 132-235-3qfync, 301-350=4units, 351-400=5units, 401-450=7units, 451-500=9units, 501-550=10units if above 551 or greater call Prolia Im Inj, Up To 60 Mg 06/04/19       Multidisciplinary Problems     Active Goals     Not on file